# Patient Record
Sex: MALE | Race: WHITE | NOT HISPANIC OR LATINO | Employment: OTHER | URBAN - METROPOLITAN AREA
[De-identification: names, ages, dates, MRNs, and addresses within clinical notes are randomized per-mention and may not be internally consistent; named-entity substitution may affect disease eponyms.]

---

## 2023-09-02 ENCOUNTER — HOSPITAL ENCOUNTER (OUTPATIENT)
Dept: DATA CONVERSION | Facility: HOSPITAL | Age: 63
Discharge: HOME | End: 2023-09-03
Payer: COMMERCIAL

## 2023-09-02 DIAGNOSIS — S09.90XA UNSPECIFIED INJURY OF HEAD, INITIAL ENCOUNTER: ICD-10-CM

## 2023-09-02 DIAGNOSIS — S01.81XA LACERATION WITHOUT FOREIGN BODY OF OTHER PART OF HEAD, INITIAL ENCOUNTER: ICD-10-CM
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
## 2023-09-02 DIAGNOSIS — Z23 ENCOUNTER FOR IMMUNIZATION: ICD-10-CM

## 2023-09-02 DIAGNOSIS — F10.129 ALCOHOL ABUSE WITH INTOXICATION, UNSPECIFIED (CMS-HCC): ICD-10-CM

## 2023-09-02 DIAGNOSIS — R11.0 NAUSEA: ICD-10-CM

## 2023-09-02 DIAGNOSIS — F17.210 NICOTINE DEPENDENCE, CIGARETTES, UNCOMPLICATED: ICD-10-CM

## 2023-09-02 LAB
ALBUMIN SERPL-MCNC: 4.2 GM/DL (ref 3.5–5)
ALBUMIN/GLOB SERPL: 1.7 RATIO (ref 1.5–3)
ALP BLD-CCNC: 98 U/L (ref 35–125)
ALT SERPL-CCNC: 20 U/L (ref 5–40)
ANION GAP SERPL CALCULATED.3IONS-SCNC: 18 MMOL/L (ref 0–19)
AST SERPL-CCNC: 32 U/L (ref 5–40)
BASOPHILS # BLD AUTO: 0.05 K/UL (ref 0–0.22)
BASOPHILS NFR BLD AUTO: 0.4 % (ref 0–1)
BILIRUB SERPL-MCNC: 0.3 MG/DL (ref 0.1–1.2)
BUN SERPL-MCNC: 14 MG/DL (ref 8–25)
BUN/CREAT SERPL: 14 RATIO (ref 8–21)
CALCIUM SERPL-MCNC: 8.7 MG/DL (ref 8.5–10.4)
CHLORIDE SERPL-SCNC: 103 MMOL/L (ref 97–107)
CO2 SERPL-SCNC: 23 MMOL/L (ref 24–31)
CREAT SERPL-MCNC: 1 MG/DL (ref 0.4–1.6)
DEPRECATED RDW RBC AUTO: 41.1 FL (ref 37–54)
DIFFERENTIAL METHOD BLD: ABNORMAL
EOSINOPHIL # BLD AUTO: 0.1 K/UL (ref 0–0.45)
EOSINOPHIL NFR BLD: 0.9 % (ref 0–3)
ERYTHROCYTE [DISTWIDTH] IN BLOOD BY AUTOMATED COUNT: 12.1 % (ref 11.7–15)
ETHANOL SERPL-MCNC: 0.3 GM/DL (ref 0–0.01)
GFR SERPL CREATININE-BSD FRML MDRD: 86 ML/MIN/1.73 M2
GLOBULIN SER-MCNC: 2.5 G/DL (ref 1.9–3.7)
GLUCOSE SERPL-MCNC: 143 MG/DL (ref 65–99)
HCT VFR BLD AUTO: 39.6 % (ref 41–50)
HGB BLD-MCNC: 14.3 GM/DL (ref 13.5–16.5)
IMM GRANULOCYTES # BLD AUTO: 0.03 K/UL (ref 0–0.1)
LYMPHOCYTES # BLD AUTO: 3.33 K/UL (ref 1.2–3.2)
LYMPHOCYTES NFR BLD MANUAL: 28.6 % (ref 20–40)
MCH RBC QN AUTO: 33.3 PG (ref 26–34)
MCHC RBC AUTO-ENTMCNC: 36.1 % (ref 31–37)
MCV RBC AUTO: 92.3 FL (ref 80–100)
MONOCYTES # BLD AUTO: 0.75 K/UL (ref 0–0.8)
MONOCYTES NFR BLD MANUAL: 6.4 % (ref 0–8)
NEUTROPHILS # BLD AUTO: 7.4 K/UL
NEUTROPHILS # BLD AUTO: 7.4 K/UL (ref 1.8–7.7)
NEUTROPHILS.IMMATURE NFR BLD: 0.3 % (ref 0–1)
NEUTS SEG NFR BLD: 63.4 % (ref 50–70)
NRBC BLD-RTO: 0 /100 WBC
PLATELET # BLD AUTO: 259 K/UL (ref 150–450)
PMV BLD AUTO: 8.5 CU (ref 7–12.6)
POTASSIUM SERPL-SCNC: 3.6 MMOL/L (ref 3.4–5.1)
PROT SERPL-MCNC: 6.7 G/DL (ref 5.9–7.9)
RBC # BLD AUTO: 4.29 M/UL (ref 4.5–5.5)
SODIUM SERPL-SCNC: 144 MMOL/L (ref 133–145)
WBC # BLD AUTO: 11.7 K/UL (ref 4.5–11)

## 2024-05-02 ENCOUNTER — APPOINTMENT (OUTPATIENT)
Dept: CARDIOLOGY | Facility: HOSPITAL | Age: 64
End: 2024-05-02
Payer: COMMERCIAL

## 2024-05-02 ENCOUNTER — HOSPITAL ENCOUNTER (EMERGENCY)
Facility: HOSPITAL | Age: 64
Discharge: HOME | End: 2024-05-02
Payer: COMMERCIAL

## 2024-05-02 VITALS
DIASTOLIC BLOOD PRESSURE: 125 MMHG | OXYGEN SATURATION: 99 % | WEIGHT: 180 LBS | RESPIRATION RATE: 15 BRPM | TEMPERATURE: 98.2 F | SYSTOLIC BLOOD PRESSURE: 206 MMHG | HEART RATE: 86 BPM | BODY MASS INDEX: 24.38 KG/M2 | HEIGHT: 72 IN

## 2024-05-02 DIAGNOSIS — R25.1 SHAKINESS: ICD-10-CM

## 2024-05-02 DIAGNOSIS — F41.9 ANXIETY: Primary | ICD-10-CM

## 2024-05-02 LAB
ALBUMIN SERPL-MCNC: 4.2 G/DL (ref 3.5–5)
ALP BLD-CCNC: 94 U/L (ref 35–125)
ALT SERPL-CCNC: 20 U/L (ref 5–40)
ANION GAP SERPL CALC-SCNC: >19 MMOL/L
AST SERPL-CCNC: 23 U/L (ref 5–40)
BASOPHILS # BLD AUTO: 0.05 X10*3/UL (ref 0–0.1)
BASOPHILS NFR BLD AUTO: 0.4 %
BILIRUB SERPL-MCNC: 0.8 MG/DL (ref 0.1–1.2)
BUN SERPL-MCNC: 13 MG/DL (ref 8–25)
CALCIUM SERPL-MCNC: 8.9 MG/DL (ref 8.5–10.4)
CHLORIDE SERPL-SCNC: 98 MMOL/L (ref 97–107)
CO2 SERPL-SCNC: 20 MMOL/L (ref 24–31)
CREAT SERPL-MCNC: 0.8 MG/DL (ref 0.4–1.6)
EGFRCR SERPLBLD CKD-EPI 2021: >90 ML/MIN/1.73M*2
EOSINOPHIL # BLD AUTO: 0.03 X10*3/UL (ref 0–0.7)
EOSINOPHIL NFR BLD AUTO: 0.3 %
ERYTHROCYTE [DISTWIDTH] IN BLOOD BY AUTOMATED COUNT: 12 % (ref 11.5–14.5)
ETHANOL SERPL-MCNC: <0.01 G/DL
GLUCOSE SERPL-MCNC: 121 MG/DL (ref 65–99)
HCT VFR BLD AUTO: 41.5 % (ref 41–52)
HGB BLD-MCNC: 14.7 G/DL (ref 13.5–17.5)
IMM GRANULOCYTES # BLD AUTO: 0.04 X10*3/UL (ref 0–0.7)
IMM GRANULOCYTES NFR BLD AUTO: 0.4 % (ref 0–0.9)
LYMPHOCYTES # BLD AUTO: 2.01 X10*3/UL (ref 1.2–4.8)
LYMPHOCYTES NFR BLD AUTO: 17.8 %
MCH RBC QN AUTO: 32.5 PG (ref 26–34)
MCHC RBC AUTO-ENTMCNC: 35.4 G/DL (ref 32–36)
MCV RBC AUTO: 92 FL (ref 80–100)
MONOCYTES # BLD AUTO: 0.67 X10*3/UL (ref 0.1–1)
MONOCYTES NFR BLD AUTO: 5.9 %
NEUTROPHILS # BLD AUTO: 8.5 X10*3/UL (ref 1.2–7.7)
NEUTROPHILS NFR BLD AUTO: 75.2 %
NRBC BLD-RTO: 0 /100 WBCS (ref 0–0)
PLATELET # BLD AUTO: 219 X10*3/UL (ref 150–450)
POTASSIUM SERPL-SCNC: 3.4 MMOL/L (ref 3.4–5.1)
PROT SERPL-MCNC: 7.5 G/DL (ref 5.9–7.9)
RBC # BLD AUTO: 4.53 X10*6/UL (ref 4.5–5.9)
SODIUM SERPL-SCNC: 138 MMOL/L (ref 133–145)
WBC # BLD AUTO: 11.3 X10*3/UL (ref 4.4–11.3)

## 2024-05-02 PROCEDURE — 85025 COMPLETE CBC W/AUTO DIFF WBC: CPT | Performed by: STUDENT IN AN ORGANIZED HEALTH CARE EDUCATION/TRAINING PROGRAM

## 2024-05-02 PROCEDURE — 99284 EMERGENCY DEPT VISIT MOD MDM: CPT | Mod: 25

## 2024-05-02 PROCEDURE — 80053 COMPREHEN METABOLIC PANEL: CPT | Performed by: STUDENT IN AN ORGANIZED HEALTH CARE EDUCATION/TRAINING PROGRAM

## 2024-05-02 PROCEDURE — 36415 COLL VENOUS BLD VENIPUNCTURE: CPT | Performed by: STUDENT IN AN ORGANIZED HEALTH CARE EDUCATION/TRAINING PROGRAM

## 2024-05-02 PROCEDURE — 82077 ASSAY SPEC XCP UR&BREATH IA: CPT | Performed by: STUDENT IN AN ORGANIZED HEALTH CARE EDUCATION/TRAINING PROGRAM

## 2024-05-02 PROCEDURE — 93005 ELECTROCARDIOGRAM TRACING: CPT

## 2024-05-02 ASSESSMENT — PAIN SCALES - GENERAL: PAINLEVEL_OUTOF10: 0 - NO PAIN

## 2024-05-02 ASSESSMENT — PAIN - FUNCTIONAL ASSESSMENT: PAIN_FUNCTIONAL_ASSESSMENT: 0-10
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
## 2024-05-02 NOTE — ED TRIAGE NOTES
Pt brought via ems w complaints of shakes and weakness and tingling in the fingers. Pt states that his symptoms started today.

## 2024-05-03 NOTE — DISCHARGE INSTRUCTIONS
Thank you for choosing [facility]  for your emergency care.    Please return to the Emergency Department immediately if new or worsening symptoms occur. Symptoms of that are most concerning include lightheadedness, dizziness, syncopal episodes, chest pain or shortness of breath.    It is important to remember that your care does not end here and you must continue to monitor your condition closely. Please return to the emergency department for any worsening or concerning signs or symptoms as directed by our conversations and the discharge instructions. Otherwise please follow up with your doctor in 2 days if no better or worse. If you do not have a doctor please contact the referral number on your discharge instructions. Please contact any physician specialists provided in your discharge notes as it is very important to follow up with them regarding your condition. If you are unable to reach the physicians provided, please come back to the Emergency Department at any time.      As always, please take medications as directed. If you have any questions at all regarding your medications, please contact the pharmacist, the emergency department, or your doctor. Before taking any medication prescribed in the Emergency Department, please review the medication side effects and drug interactions as they may interact with your home medications.     Education materials have been provided to you about your encounter today.  Please review the attachments at your earliest convenience.

## 2024-05-07 LAB
ATRIAL RATE: 87 BPM
P AXIS: 81 DEGREES
P OFFSET: 193 MS
P ONSET: 133 MS
PR INTERVAL: 166 MS
Q ONSET: 216 MS
QRS COUNT: 15 BEATS
QRS DURATION: 90 MS
QT INTERVAL: 370 MS
QTC CALCULATION(BAZETT): 445 MS
QTC FREDERICIA: 418 MS
R AXIS: 71 DEGREES
T AXIS: 75 DEGREES
T OFFSET: 401 MS
VENTRICULAR RATE: 87 BPM

## 2024-07-28 ENCOUNTER — APPOINTMENT (OUTPATIENT)
Dept: RADIOLOGY | Facility: HOSPITAL | Age: 64
End: 2024-07-28
Payer: COMMERCIAL

## 2024-07-28 ENCOUNTER — APPOINTMENT (OUTPATIENT)
Dept: CARDIOLOGY | Facility: HOSPITAL | Age: 64
End: 2024-07-28
Payer: COMMERCIAL

## 2024-07-28 ENCOUNTER — HOSPITAL ENCOUNTER (OUTPATIENT)
Facility: HOSPITAL | Age: 64
Setting detail: OBSERVATION
Discharge: HOME | End: 2024-07-30
Attending: EMERGENCY MEDICINE | Admitting: HOSPITALIST
Payer: COMMERCIAL

## 2024-07-28 DIAGNOSIS — N30.00 ACUTE CYSTITIS WITHOUT HEMATURIA: ICD-10-CM

## 2024-07-28 DIAGNOSIS — F10.29 ALCOHOL DEPENDENCE WITH UNSPECIFIED ALCOHOL-INDUCED DISORDER (MULTI): ICD-10-CM

## 2024-07-28 DIAGNOSIS — F19.10 SUBSTANCE ABUSE (MULTI): ICD-10-CM

## 2024-07-28 DIAGNOSIS — R06.02 SHORTNESS OF BREATH: Primary | ICD-10-CM

## 2024-07-28 DIAGNOSIS — R29.6 FREQUENT FALLS: ICD-10-CM

## 2024-07-28 DIAGNOSIS — I10 DIASTOLIC HYPERTENSION: ICD-10-CM

## 2024-07-28 DIAGNOSIS — E87.20 LACTIC ACIDOSIS: ICD-10-CM

## 2024-07-28 PROBLEM — N39.0 URINARY TRACT INFECTION: Status: ACTIVE | Noted: 2024-07-28

## 2024-07-28 LAB
ALBUMIN SERPL-MCNC: 4.1 G/DL (ref 3.5–5)
ALP BLD-CCNC: 99 U/L (ref 35–125)
ALT SERPL-CCNC: 40 U/L (ref 5–40)
AMPHETAMINES UR QL SCN>1000 NG/ML: NEGATIVE
ANION GAP SERPL CALC-SCNC: 15 MMOL/L
APAP SERPL-MCNC: <5 UG/ML
APPEARANCE UR: CLEAR
AST SERPL-CCNC: 61 U/L (ref 5–40)
BARBITURATES UR QL SCN>300 NG/ML: NEGATIVE
BASOPHILS # BLD AUTO: 0.03 X10*3/UL (ref 0–0.1)
BASOPHILS NFR BLD AUTO: 0.2 %
BENZODIAZ UR QL SCN>300 NG/ML: NEGATIVE
BILIRUB SERPL-MCNC: 0.5 MG/DL (ref 0.1–1.2)
BILIRUB UR STRIP.AUTO-MCNC: NEGATIVE MG/DL
BUN SERPL-MCNC: 11 MG/DL (ref 8–25)
BZE UR QL SCN>300 NG/ML: NEGATIVE
CALCIUM SERPL-MCNC: 8.5 MG/DL (ref 8.5–10.4)
CANNABINOIDS UR QL SCN>50 NG/ML: POSITIVE
CHLORIDE SERPL-SCNC: 99 MMOL/L (ref 97–107)
CO2 SERPL-SCNC: 24 MMOL/L (ref 24–31)
COLOR UR: ABNORMAL
CREAT SERPL-MCNC: 0.8 MG/DL (ref 0.4–1.6)
EGFRCR SERPLBLD CKD-EPI 2021: >90 ML/MIN/1.73M*2
EOSINOPHIL # BLD AUTO: 0.44 X10*3/UL (ref 0–0.7)
EOSINOPHIL NFR BLD AUTO: 2.7 %
ERYTHROCYTE [DISTWIDTH] IN BLOOD BY AUTOMATED COUNT: 11.7 % (ref 11.5–14.5)
ETHANOL SERPL-MCNC: 0.09 G/DL
ETHANOL SERPL-MCNC: 0.29 G/DL
FENTANYL+NORFENTANYL UR QL SCN: NEGATIVE
GLUCOSE SERPL-MCNC: 97 MG/DL (ref 65–99)
GLUCOSE UR STRIP.AUTO-MCNC: NORMAL MG/DL
HCT VFR BLD AUTO: 42.1 % (ref 41–52)
HGB BLD-MCNC: 15.1 G/DL (ref 13.5–17.5)
HYALINE CASTS #/AREA URNS AUTO: ABNORMAL /LPF
IMM GRANULOCYTES # BLD AUTO: 0.06 X10*3/UL (ref 0–0.7)
IMM GRANULOCYTES NFR BLD AUTO: 0.4 % (ref 0–0.9)
INR PPP: 1 (ref 0.9–1.2)
KETONES UR STRIP.AUTO-MCNC: NEGATIVE MG/DL
LACTATE BLDV-SCNC: 2.6 MMOL/L (ref 0.4–2)
LACTATE BLDV-SCNC: 2.8 MMOL/L (ref 0.4–2)
LEUKOCYTE ESTERASE UR QL STRIP.AUTO: ABNORMAL
LYMPHOCYTES # BLD AUTO: 2.82 X10*3/UL (ref 1.2–4.8)
LYMPHOCYTES NFR BLD AUTO: 17.3 %
MAGNESIUM SERPL-MCNC: 1.9 MG/DL (ref 1.6–3.1)
MCH RBC QN AUTO: 32.3 PG (ref 26–34)
MCHC RBC AUTO-ENTMCNC: 35.9 G/DL (ref 32–36)
MCV RBC AUTO: 90 FL (ref 80–100)
METHADONE UR QL SCN>300 NG/ML: NEGATIVE
MONOCYTES # BLD AUTO: 0.91 X10*3/UL (ref 0.1–1)
MONOCYTES NFR BLD AUTO: 5.6 %
MUCOUS THREADS #/AREA URNS AUTO: ABNORMAL /LPF
NEUTROPHILS # BLD AUTO: 12.02 X10*3/UL (ref 1.2–7.7)
NEUTROPHILS NFR BLD AUTO: 73.8 %
NITRITE UR QL STRIP.AUTO: NEGATIVE
NRBC BLD-RTO: 0 /100 WBCS (ref 0–0)
OPIATES UR QL SCN>300 NG/ML: NEGATIVE
OXYCODONE UR QL: NEGATIVE
PCP UR QL SCN>25 NG/ML: NEGATIVE
PH UR STRIP.AUTO: 6 [PH]
PLATELET # BLD AUTO: 265 X10*3/UL (ref 150–450)
POTASSIUM SERPL-SCNC: 3.7 MMOL/L (ref 3.4–5.1)
PROT SERPL-MCNC: 6.8 G/DL (ref 5.9–7.9)
PROT UR STRIP.AUTO-MCNC: NEGATIVE MG/DL
PROTHROMBIN TIME: 10.3 SECONDS (ref 9.3–12.7)
RBC # BLD AUTO: 4.67 X10*6/UL (ref 4.5–5.9)
RBC # UR STRIP.AUTO: ABNORMAL /UL
RBC #/AREA URNS AUTO: ABNORMAL /HPF
SALICYLATES SERPL-MCNC: <0 MG/DL
SODIUM SERPL-SCNC: 138 MMOL/L (ref 133–145)
SP GR UR STRIP.AUTO: 1.01
SQUAMOUS #/AREA URNS AUTO: ABNORMAL /HPF
TROPONIN T SERPL-MCNC: 8 NG/L
TROPONIN T SERPL-MCNC: 8 NG/L
UROBILINOGEN UR STRIP.AUTO-MCNC: NORMAL MG/DL
WBC # BLD AUTO: 16.3 X10*3/UL (ref 4.4–11.3)
WBC #/AREA URNS AUTO: ABNORMAL /HPF

## 2024-07-28 PROCEDURE — 2500000004 HC RX 250 GENERAL PHARMACY W/ HCPCS (ALT 636 FOR OP/ED): Performed by: NURSE PRACTITIONER

## 2024-07-28 PROCEDURE — 70450 CT HEAD/BRAIN W/O DYE: CPT | Performed by: RADIOLOGY

## 2024-07-28 PROCEDURE — 36415 COLL VENOUS BLD VENIPUNCTURE: CPT

## 2024-07-28 PROCEDURE — 82077 ASSAY SPEC XCP UR&BREATH IA: CPT | Performed by: NURSE PRACTITIONER

## 2024-07-28 PROCEDURE — 96361 HYDRATE IV INFUSION ADD-ON: CPT

## 2024-07-28 PROCEDURE — 2500000001 HC RX 250 WO HCPCS SELF ADMINISTERED DRUGS (ALT 637 FOR MEDICARE OP): Performed by: NURSE PRACTITIONER

## 2024-07-28 PROCEDURE — 2500000001 HC RX 250 WO HCPCS SELF ADMINISTERED DRUGS (ALT 637 FOR MEDICARE OP): Performed by: EMERGENCY MEDICINE

## 2024-07-28 PROCEDURE — 83735 ASSAY OF MAGNESIUM: CPT | Performed by: NURSE PRACTITIONER

## 2024-07-28 PROCEDURE — 87086 URINE CULTURE/COLONY COUNT: CPT | Mod: WESLAB | Performed by: EMERGENCY MEDICINE

## 2024-07-28 PROCEDURE — 70486 CT MAXILLOFACIAL W/O DYE: CPT

## 2024-07-28 PROCEDURE — 2500000004 HC RX 250 GENERAL PHARMACY W/ HCPCS (ALT 636 FOR OP/ED): Performed by: EMERGENCY MEDICINE

## 2024-07-28 PROCEDURE — 2500000004 HC RX 250 GENERAL PHARMACY W/ HCPCS (ALT 636 FOR OP/ED)

## 2024-07-28 PROCEDURE — 96375 TX/PRO/DX INJ NEW DRUG ADDON: CPT | Mod: 59

## 2024-07-28 PROCEDURE — 93010 ELECTROCARDIOGRAM REPORT: CPT | Performed by: INTERNAL MEDICINE

## 2024-07-28 PROCEDURE — 80143 DRUG ASSAY ACETAMINOPHEN: CPT

## 2024-07-28 PROCEDURE — 70450 CT HEAD/BRAIN W/O DYE: CPT

## 2024-07-28 PROCEDURE — 99291 CRITICAL CARE FIRST HOUR: CPT | Mod: 25

## 2024-07-28 PROCEDURE — 71260 CT THORAX DX C+: CPT | Performed by: RADIOLOGY

## 2024-07-28 PROCEDURE — 80307 DRUG TEST PRSMV CHEM ANLYZR: CPT | Performed by: EMERGENCY MEDICINE

## 2024-07-28 PROCEDURE — 96365 THER/PROPH/DIAG IV INF INIT: CPT | Mod: 59

## 2024-07-28 PROCEDURE — 2550000001 HC RX 255 CONTRASTS: Performed by: EMERGENCY MEDICINE

## 2024-07-28 PROCEDURE — 93005 ELECTROCARDIOGRAM TRACING: CPT

## 2024-07-28 PROCEDURE — 72125 CT NECK SPINE W/O DYE: CPT

## 2024-07-28 PROCEDURE — 87040 BLOOD CULTURE FOR BACTERIA: CPT | Mod: WESLAB | Performed by: EMERGENCY MEDICINE

## 2024-07-28 PROCEDURE — 85025 COMPLETE CBC W/AUTO DIFF WBC: CPT

## 2024-07-28 PROCEDURE — 83605 ASSAY OF LACTIC ACID: CPT | Performed by: EMERGENCY MEDICINE

## 2024-07-28 PROCEDURE — G0378 HOSPITAL OBSERVATION PER HR: HCPCS

## 2024-07-28 PROCEDURE — 84484 ASSAY OF TROPONIN QUANT: CPT | Performed by: EMERGENCY MEDICINE

## 2024-07-28 PROCEDURE — 72125 CT NECK SPINE W/O DYE: CPT | Performed by: RADIOLOGY

## 2024-07-28 PROCEDURE — 84075 ASSAY ALKALINE PHOSPHATASE: CPT

## 2024-07-28 PROCEDURE — 74177 CT ABD & PELVIS W/CONTRAST: CPT

## 2024-07-28 PROCEDURE — 85610 PROTHROMBIN TIME: CPT | Performed by: EMERGENCY MEDICINE

## 2024-07-28 PROCEDURE — 74177 CT ABD & PELVIS W/CONTRAST: CPT | Performed by: RADIOLOGY

## 2024-07-28 PROCEDURE — 36415 COLL VENOUS BLD VENIPUNCTURE: CPT | Performed by: EMERGENCY MEDICINE

## 2024-07-28 PROCEDURE — 81001 URINALYSIS AUTO W/SCOPE: CPT | Performed by: EMERGENCY MEDICINE

## 2024-07-28 RX ORDER — FAMOTIDINE 10 MG/ML
20 INJECTION INTRAVENOUS ONCE
Status: COMPLETED | OUTPATIENT
Start: 2024-07-28 | End: 2024-07-28

## 2024-07-28 RX ORDER — ACETAMINOPHEN 650 MG/1
650 SUPPOSITORY RECTAL EVERY 4 HOURS PRN
Status: DISCONTINUED | OUTPATIENT
Start: 2024-07-28 | End: 2024-07-30 | Stop reason: HOSPADM

## 2024-07-28 RX ORDER — ACETAMINOPHEN 325 MG/1
650 TABLET ORAL EVERY 4 HOURS PRN
Status: DISCONTINUED | OUTPATIENT
Start: 2024-07-28 | End: 2024-07-30 | Stop reason: HOSPADM

## 2024-07-28 RX ORDER — MULTIVIT-MIN/IRON FUM/FOLIC AC 7.5 MG-4
1 TABLET ORAL ONCE
Status: COMPLETED | OUTPATIENT
Start: 2024-07-28 | End: 2024-07-28

## 2024-07-28 RX ORDER — DIPHENHYDRAMINE HYDROCHLORIDE 50 MG/ML
25 INJECTION INTRAMUSCULAR; INTRAVENOUS ONCE
Status: COMPLETED | OUTPATIENT
Start: 2024-07-28 | End: 2024-07-28

## 2024-07-28 RX ORDER — ACETAMINOPHEN 160 MG/5ML
650 SOLUTION ORAL EVERY 4 HOURS PRN
Status: DISCONTINUED | OUTPATIENT
Start: 2024-07-28 | End: 2024-07-30 | Stop reason: HOSPADM

## 2024-07-28 RX ORDER — FOLIC ACID 1 MG/1
1 TABLET ORAL DAILY
Status: DISCONTINUED | OUTPATIENT
Start: 2024-07-28 | End: 2024-07-30 | Stop reason: HOSPADM

## 2024-07-28 RX ORDER — ENOXAPARIN SODIUM 100 MG/ML
40 INJECTION SUBCUTANEOUS DAILY
Status: DISCONTINUED | OUTPATIENT
Start: 2024-07-29 | End: 2024-07-30 | Stop reason: HOSPADM

## 2024-07-28 RX ORDER — DIPHENHYDRAMINE HCL 25 MG
25 TABLET ORAL EVERY 6 HOURS PRN
Status: DISCONTINUED | OUTPATIENT
Start: 2024-07-28 | End: 2024-07-30 | Stop reason: HOSPADM

## 2024-07-28 RX ORDER — LORAZEPAM 1 MG/1
1 TABLET ORAL EVERY 2 HOUR PRN
Status: DISCONTINUED | OUTPATIENT
Start: 2024-07-28 | End: 2024-07-30 | Stop reason: HOSPADM

## 2024-07-28 RX ORDER — DIAZEPAM 5 MG/1
10 TABLET ORAL EVERY 2 HOUR PRN
Status: DISCONTINUED | OUTPATIENT
Start: 2024-07-28 | End: 2024-07-28

## 2024-07-28 RX ORDER — ONDANSETRON 4 MG/1
4 TABLET, FILM COATED ORAL EVERY 8 HOURS PRN
Status: DISCONTINUED | OUTPATIENT
Start: 2024-07-28 | End: 2024-07-30 | Stop reason: HOSPADM

## 2024-07-28 RX ORDER — ONDANSETRON HYDROCHLORIDE 2 MG/ML
4 INJECTION, SOLUTION INTRAVENOUS EVERY 8 HOURS PRN
Status: DISCONTINUED | OUTPATIENT
Start: 2024-07-28 | End: 2024-07-30 | Stop reason: HOSPADM

## 2024-07-28 RX ORDER — LANOLIN ALCOHOL/MO/W.PET/CERES
100 CREAM (GRAM) TOPICAL DAILY
Status: DISCONTINUED | OUTPATIENT
Start: 2024-07-31 | End: 2024-07-30 | Stop reason: HOSPADM

## 2024-07-28 RX ORDER — TALC
3 POWDER (GRAM) TOPICAL NIGHTLY PRN
Status: DISCONTINUED | OUTPATIENT
Start: 2024-07-28 | End: 2024-07-30 | Stop reason: HOSPADM

## 2024-07-28 RX ORDER — MULTIVIT-MIN/IRON FUM/FOLIC AC 7.5 MG-4
1 TABLET ORAL DAILY
Status: DISCONTINUED | OUTPATIENT
Start: 2024-07-28 | End: 2024-07-30 | Stop reason: HOSPADM

## 2024-07-28 RX ORDER — FOLIC ACID 1 MG/1
1 TABLET ORAL ONCE
Status: COMPLETED | OUTPATIENT
Start: 2024-07-28 | End: 2024-07-28

## 2024-07-28 RX ORDER — HYDROCORTISONE 1 %
CREAM (GRAM) TOPICAL 2 TIMES DAILY
Status: DISCONTINUED | OUTPATIENT
Start: 2024-07-28 | End: 2024-07-30 | Stop reason: HOSPADM

## 2024-07-28 RX ORDER — THIAMINE HYDROCHLORIDE 100 MG/ML
100 INJECTION, SOLUTION INTRAMUSCULAR; INTRAVENOUS DAILY
Status: COMPLETED | OUTPATIENT
Start: 2024-07-28 | End: 2024-07-30

## 2024-07-28 RX ORDER — LORAZEPAM 1 MG/1
2 TABLET ORAL EVERY 2 HOUR PRN
Status: DISCONTINUED | OUTPATIENT
Start: 2024-07-28 | End: 2024-07-30 | Stop reason: HOSPADM

## 2024-07-28 RX ORDER — MAGNESIUM HYDROXIDE 2400 MG/10ML
10 SUSPENSION ORAL DAILY PRN
Status: DISCONTINUED | OUTPATIENT
Start: 2024-07-28 | End: 2024-07-30 | Stop reason: HOSPADM

## 2024-07-28 RX ORDER — LORAZEPAM 0.5 MG/1
0.5 TABLET ORAL EVERY 2 HOUR PRN
Status: DISCONTINUED | OUTPATIENT
Start: 2024-07-28 | End: 2024-07-30 | Stop reason: HOSPADM

## 2024-07-28 SDOH — SOCIAL STABILITY: SOCIAL INSECURITY: ABUSE: ADULT

## 2024-07-28 SDOH — SOCIAL STABILITY: SOCIAL INSECURITY: WERE YOU ABLE TO COMPLETE ALL THE BEHAVIORAL HEALTH SCREENINGS?: YES

## 2024-07-28 SDOH — SOCIAL STABILITY: SOCIAL INSECURITY: ARE YOU OR HAVE YOU BEEN THREATENED OR ABUSED PHYSICALLY, EMOTIONALLY, OR SEXUALLY BY ANYONE?: YES

## 2024-07-28 SDOH — SOCIAL STABILITY: SOCIAL INSECURITY: ARE THERE ANY APPARENT SIGNS OF INJURIES/BEHAVIORS THAT COULD BE RELATED TO ABUSE/NEGLECT?: NO

## 2024-07-28 SDOH — SOCIAL STABILITY: SOCIAL INSECURITY: DO YOU FEEL ANYONE HAS EXPLOITED OR TAKEN ADVANTAGE OF YOU FINANCIALLY OR OF YOUR PERSONAL PROPERTY?: NO

## 2024-07-28 SDOH — SOCIAL STABILITY: SOCIAL INSECURITY: HAVE YOU HAD THOUGHTS OF HARMING ANYONE ELSE?: NO

## 2024-07-28 SDOH — SOCIAL STABILITY: SOCIAL INSECURITY: DO YOU FEEL UNSAFE GOING BACK TO THE PLACE WHERE YOU ARE LIVING?: NO

## 2024-07-28 SDOH — SOCIAL STABILITY: SOCIAL INSECURITY: HAVE YOU HAD ANY THOUGHTS OF HARMING ANYONE ELSE?: NO

## 2024-07-28 SDOH — SOCIAL STABILITY: SOCIAL INSECURITY: DOES ANYONE TRY TO KEEP YOU FROM HAVING/CONTACTING OTHER FRIENDS OR DOING THINGS OUTSIDE YOUR HOME?: NO

## 2024-07-28 SDOH — SOCIAL STABILITY: SOCIAL INSECURITY: HAS ANYONE EVER THREATENED TO HURT YOUR FAMILY OR YOUR PETS?: NO

## 2024-07-28 ASSESSMENT — PATIENT HEALTH QUESTIONNAIRE - PHQ9
1. LITTLE INTEREST OR PLEASURE IN DOING THINGS: SEVERAL DAYS
SUM OF ALL RESPONSES TO PHQ9 QUESTIONS 1 & 2: 2
2. FEELING DOWN, DEPRESSED OR HOPELESS: SEVERAL DAYS

## 2024-07-28 ASSESSMENT — LIFESTYLE VARIABLES
HAVE PEOPLE ANNOYED YOU BY CRITICIZING YOUR DRINKING: YES
HAS A RELATIVE, FRIEND, DOCTOR, OR ANOTHER HEALTH PROFESSIONAL EXPRESSED CONCERN ABOUT YOUR DRINKING OR SUGGESTED YOU CUT DOWN: YES, DURING THE LAST YEAR
VISUAL DISTURBANCES: NOT PRESENT
HAVE YOU EVER FELT YOU SHOULD CUT DOWN ON YOUR DRINKING: YES
HOW MANY STANDARD DRINKS CONTAINING ALCOHOL DO YOU HAVE ON A TYPICAL DAY: 10 OR MORE
TREMOR: 3
AUDIT-C TOTAL SCORE: 11
HEADACHE, FULLNESS IN HEAD: NOT PRESENT
SKIP TO QUESTIONS 9-10: 0
PAROXYSMAL SWEATS: NO SWEAT VISIBLE
TOTAL SCORE: 0
HOW OFTEN DURING THE LAST YEAR HAVE YOU FAILED TO DO WHAT WAS NORMALLY EXPECTED FROM YOU BECAUSE OF DRINKING: WEEKLY
AUDITORY DISTURBANCES: NOT PRESENT
PAROXYSMAL SWEATS: NO SWEAT VISIBLE
HAVE YOU OR SOMEONE ELSE BEEN INJURED AS A RESULT OF YOUR DRINKING: YES, DURING THE LAST YEAR
VISUAL DISTURBANCES: NOT PRESENT
SUBSTANCE_ABUSE_PAST_12_MONTHS: NO
PRESCIPTION_ABUSE_PAST_12_MONTHS: NO
NAUSEA AND VOMITING: NO NAUSEA AND NO VOMITING
NAUSEA AND VOMITING: NO NAUSEA AND NO VOMITING
ANXIETY: MODERATELY ANXIOUS, OR GUARDED, SO ANXIETY IS INFERRED
AGITATION: NORMAL ACTIVITY
PAROXYSMAL SWEATS: NO SWEAT VISIBLE
VISUAL DISTURBANCES: NOT PRESENT
ORIENTATION AND CLOUDING OF SENSORIUM: ORIENTED AND CAN DO SERIAL ADDITIONS
AUDITORY DISTURBANCES: NOT PRESENT
HEADACHE, FULLNESS IN HEAD: NOT PRESENT
EVER HAD A DRINK FIRST THING IN THE MORNING TO STEADY YOUR NERVES TO GET RID OF A HANGOVER: YES
AUDIT TOTAL SCORE: 34
TOTAL SCORE: 4
AGITATION: NORMAL ACTIVITY
HOW OFTEN DURING THE LAST YEAR HAVE YOU FOUND THAT YOU WERE NOT ABLE TO STOP DRINKING ONCE YOU HAD STARTED: WEEKLY
AGITATION: NORMAL ACTIVITY
HOW OFTEN DURING THE LAST YEAR HAVE YOU NEEDED AN ALCOHOLIC DRINK FIRST THING IN THE MORNING TO GET YOURSELF GOING AFTER A NIGHT OF HEAVY DRINKING: WEEKLY
AUDIT-C TOTAL SCORE: 11
ORIENTATION AND CLOUDING OF SENSORIUM: ORIENTED AND CAN DO SERIAL ADDITIONS
TREMOR: NO TREMOR
HOW OFTEN DO YOU HAVE 6 OR MORE DRINKS ON ONE OCCASION: WEEKLY
HOW OFTEN DURING THE LAST YEAR HAVE YOU HAD A FEELING OF GUILT OR REMORSE AFTER DRINKING: WEEKLY
TOTAL SCORE: 7
ANXIETY: MODERATELY ANXIOUS, OR GUARDED, SO ANXIETY IS INFERRED
NAUSEA AND VOMITING: NO NAUSEA AND NO VOMITING
TOTAL SCORE: 7
AUDIT TOTAL SCORE: 23
TREMOR: 3
HOW OFTEN DURING THE LAST YEAR HAVE YOU BEEN UNABLE TO REMEMBER WHAT HAPPENED THE NIGHT BEFORE BECAUSE YOU HAD BEEN DRINKING: WEEKLY
EVER FELT BAD OR GUILTY ABOUT YOUR DRINKING: YES
AUDITORY DISTURBANCES: NOT PRESENT
ANXIETY: NO ANXIETY, AT EASE
ORIENTATION AND CLOUDING OF SENSORIUM: ORIENTED AND CAN DO SERIAL ADDITIONS
HOW OFTEN DO YOU HAVE A DRINK CONTAINING ALCOHOL: 4 OR MORE TIMES A WEEK
HEADACHE, FULLNESS IN HEAD: NOT PRESENT

## 2024-07-28 ASSESSMENT — ACTIVITIES OF DAILY LIVING (ADL)
FEEDING YOURSELF: INDEPENDENT
BATHING: INDEPENDENT
LACK_OF_TRANSPORTATION: YES
DRESSING YOURSELF: INDEPENDENT
ADEQUATE_TO_COMPLETE_ADL: YES
GROOMING: INDEPENDENT
TOILETING: INDEPENDENT
JUDGMENT_ADEQUATE_SAFELY_COMPLETE_DAILY_ACTIVITIES: YES
PATIENT'S MEMORY ADEQUATE TO SAFELY COMPLETE DAILY ACTIVITIES?: YES
WALKS IN HOME: INDEPENDENT
HEARING - RIGHT EAR: FUNCTIONAL
HEARING - LEFT EAR: FUNCTIONAL

## 2024-07-28 ASSESSMENT — PAIN - FUNCTIONAL ASSESSMENT: PAIN_FUNCTIONAL_ASSESSMENT: 0-10

## 2024-07-28 ASSESSMENT — COLUMBIA-SUICIDE SEVERITY RATING SCALE - C-SSRS
1. IN THE PAST MONTH, HAVE YOU WISHED YOU WERE DEAD OR WISHED YOU COULD GO TO SLEEP AND NOT WAKE UP?: NO
6. HAVE YOU EVER DONE ANYTHING, STARTED TO DO ANYTHING, OR PREPARED TO DO ANYTHING TO END YOUR LIFE?: NO
2. HAVE YOU ACTUALLY HAD ANY THOUGHTS OF KILLING YOURSELF?: NO

## 2024-07-28 ASSESSMENT — COGNITIVE AND FUNCTIONAL STATUS - GENERAL
DAILY ACTIVITIY SCORE: 24
MOBILITY SCORE: 24
PATIENT BASELINE BEDBOUND: NO

## 2024-07-28 ASSESSMENT — ENCOUNTER SYMPTOMS
APPETITE CHANGE: 1
RHINORRHEA: 0
FATIGUE: 1
NUMBNESS: 0
DYSURIA: 0
VOMITING: 0
ABDOMINAL DISTENTION: 0
PALPITATIONS: 0
CONSTIPATION: 1
DIZZINESS: 0
SHORTNESS OF BREATH: 0
CHEST TIGHTNESS: 0
NAUSEA: 0
FEVER: 0
DIARRHEA: 0
COUGH: 0
ABDOMINAL PAIN: 0
LIGHT-HEADEDNESS: 0

## 2024-07-28 ASSESSMENT — PAIN SCALES - GENERAL
PAINLEVEL_OUTOF10: 0 - NO PAIN
PAINLEVEL_OUTOF10: 0 - NO PAIN

## 2024-07-28 NOTE — ED PROVIDER NOTES
HPI   Chief Complaint   Patient presents with    Alcohol Intoxication    Facial Swelling    Shortness of Breath       HPI  Patient is a 64-year-old male presenting to the ER for evaluation of facial swelling with generalized malaise.  Patient states that he was recently released from [facility] last Tuesday.  He states that he does have an alcohol problem and drinks more than 3 pints of vodka each day.  He states that he does have frequent blackouts and believes that he has fallen but does not know exactly when that was.  He is unsure if he hit his head or lost consciousness.  He states that he does have chronic dyspnea and does not note any new changes in the symptoms.  He denies any headache or neck or back pain.  He states that he woke up this morning and felt that his eyes were puffy.  Denies any vision changes.  Denies eye pain.  Denies difficulty swallowing or speaking.  He has no other acute complaints.      Patient History   No past medical history on file.  No past surgical history on file.  No family history on file.  Social History     Tobacco Use    Smoking status: Not on file    Smokeless tobacco: Not on file   Substance Use Topics    Alcohol use: Not on file    Drug use: Not on file       Physical Exam   ED Triage Vitals [07/28/24 1314]   Temperature Heart Rate Respirations BP   36.5 °C (97.7 °F) (!) 112 16 (!) 149/116      Pulse Ox Temp Source Heart Rate Source Patient Position   97 % Temporal Monitor Sitting      BP Location FiO2 (%)     Left arm --       Physical Exam  Vitals and nursing note reviewed.   Constitutional:       General: He is not in acute distress.     Appearance: He is well-developed.      Comments: Clinically intoxicated but in no acute distress   HENT:      Head: Normocephalic and atraumatic.      Mouth/Throat:      Comments: Poor dentition.  No evidence of angioedema, tolerating secretions well, no fluctuant masses identified, no peritonsillar abscess, uvula midline  Eyes:       Conjunctiva/sclera: Conjunctivae normal.   Neck:      Comments: No midline tenderness to palpation  Cardiovascular:      Rate and Rhythm: Normal rate and regular rhythm.      Heart sounds: No murmur heard.  Pulmonary:      Effort: Pulmonary effort is normal. No respiratory distress.      Breath sounds: Normal breath sounds.   Abdominal:      Palpations: Abdomen is soft.      Tenderness: There is no abdominal tenderness.   Musculoskeletal:         General: No swelling.      Cervical back: Normal range of motion and neck supple.   Skin:     General: Skin is warm and dry.      Capillary Refill: Capillary refill takes less than 2 seconds.   Neurological:      Mental Status: He is alert.   Psychiatric:         Mood and Affect: Mood normal.           ED Course & MDM                        Divine Coma Scale Score: 15                        Medical Decision Making  Parts of this chart have been completed using voice recognition software. Please excuse any errors of transcription.  My thought process and reason for plan has been formulated from the time that I saw the patient until the time of disposition and is not specific to one specific moment during their visit and furthermore my MDM encompasses this entire chart and not only this text box.      HPI: Detailed above.    Exam: A medically appropriate exam performed, outlined above, given the known history and presentation.    History obtained from: Patient    Social Determinants of Health considered during this visit: Lives independently    Medications given during visit:  Medications   thiamine (Vitamin B1) injection 100 mg (100 mg intravenous Given 7/28/24 1520)   thiamine (Vitamin B-1) tablet 100 mg (has no administration in time range)   diazePAM (Valium) tablet 10 mg (has no administration in time range)   sodium chloride 0.9 % bolus 2,382 mL (has no administration in time range)   diphenhydrAMINE (BENADryl) injection 25 mg (25 mg intravenous Given 7/28/24  1356)   methylPREDNISolone sod succinate (SOLU-Medrol) injection 125 mg (125 mg intravenous Given 7/28/24 1356)   famotidine PF (Pepcid) injection 20 mg (20 mg intravenous Given 7/28/24 1356)   folic acid (Folvite) tablet 1 mg (1 mg oral Given 7/28/24 1428)   multivitamin with minerals 1 tablet (1 tablet oral Given 7/28/24 1427)   sodium chloride 0.9 % bolus 1,000 mL (1,000 mL intravenous New Bag 7/28/24 1425)   cefepime (Maxipime) in dextrose 5% 50 mL IV 1 g (1 g intravenous Given 7/28/24 1520)   iohexol (OMNIPaque) 350 mg iodine/mL solution 75 mL (75 mL intravenous Given 7/28/24 1452)        Diagnostic/tests  Labs Reviewed   CBC WITH AUTO DIFFERENTIAL - Abnormal       Result Value    WBC 16.3 (*)     nRBC 0.0      RBC 4.67      Hemoglobin 15.1      Hematocrit 42.1      MCV 90      MCH 32.3      MCHC 35.9      RDW 11.7      Platelets 265      Neutrophils % 73.8      Immature Granulocytes %, Automated 0.4      Lymphocytes % 17.3      Monocytes % 5.6      Eosinophils % 2.7      Basophils % 0.2      Neutrophils Absolute 12.02 (*)     Immature Granulocytes Absolute, Automated 0.06      Lymphocytes Absolute 2.82      Monocytes Absolute 0.91      Eosinophils Absolute 0.44      Basophils Absolute 0.03     COMPREHENSIVE METABOLIC PANEL - Abnormal    Glucose 97      Sodium 138      Potassium 3.7      Chloride 99      Bicarbonate 24      Urea Nitrogen 11      Creatinine 0.80      eGFR >90      Calcium 8.5      Albumin 4.1      Alkaline Phosphatase 99      Total Protein 6.8      AST 61 (*)     Bilirubin, Total 0.5      ALT 40      Anion Gap 15     ACUTE TOXICOLOGY PANEL, BLOOD - Abnormal    Acetaminophen <5.0      Salicylate  <0      Alcohol 0.288 (*)    DRUG SCREEN,URINE - Abnormal    Amphetamine Screen, Urine Negative      Barbiturate Screen, Urine Negative      Benzodiazepines Screen, Urine Negative      Cannabinoid Screen, Urine Positive (*)     Cocaine Metabolite Screen, Urine Negative      Fentanyl Screen, Urine  Negative      Methadone Screen, Urine Negative      Opiate Screen, Urine Negative      Oxycodone Screen, Urine Negative      PCP Screen, Urine Negative      Narrative:     These toxicological screening tests provide unconfirmed qualitative measurements to aid in treatment and diagnosis in cases of drug use or overdose. This test is used only for medical purposes. A positive result does not indicate or measure intoxication. For specific test performance or pathologist consultation, please contact the Laboratory.    The following threshold concentrations are used for these analyses.Values at or above the threshold concentration are reported as positive. Values below the threshold are reported as negative.    Drug /Screening Threshold                                                                                                 THC/CANNABINOIDS................50 ng/ml  METHADONE......................300 ng/ml  COCAINE METABOLITES............300 ng/ml  BENZODIAZEPINE.................300 ng/ml  PCP.............................25 ng/ml  OPIATE.........................300 ng/ml  AMPHETAMINE/ECSTASY...........1000 ng/ml  BARBITURATE....................200 ng/ml  OXYCODONE......................100 ng/ml  FENTANYL.........................5 ng/ml       BLOOD GAS LACTIC ACID, VENOUS - Abnormal    POCT Lactate, Venous 2.6 (*)    URINALYSIS WITH REFLEX CULTURE AND MICROSCOPIC - Abnormal    Color, Urine Light-Yellow      Appearance, Urine Clear      Specific Gravity, Urine 1.012      pH, Urine 6.0      Protein, Urine NEGATIVE      Glucose, Urine Normal      Blood, Urine 0.03 (TRACE) (*)     Ketones, Urine NEGATIVE      Bilirubin, Urine NEGATIVE      Urobilinogen, Urine Normal      Nitrite, Urine NEGATIVE      Leukocyte Esterase, Urine 25 Rhonda/µL (*)    MICROSCOPIC ONLY, URINE - Abnormal    WBC, Urine 1-5      RBC, Urine 1-2      Squamous Epithelial Cells, Urine 1-9 (SPARSE)      Mucus, Urine FEW      Hyaline Casts, Urine  OCCASIONAL (*)    PROTIME-INR - Normal    Protime 10.3      INR 1.0      Narrative:     INR Therapeutic Range: 2.0-3.5   TROPONIN T, HIGH SENSITIVITY - Normal    Troponin T, High Sensitivity 8     BLOOD CULTURE   BLOOD CULTURE   URINE CULTURE   URINALYSIS WITH REFLEX CULTURE AND MICROSCOPIC    Narrative:     The following orders were created for panel order Urinalysis with Reflex Culture and Microscopic.  Procedure                               Abnormality         Status                     ---------                               -----------         ------                     Urinalysis with Reflex C...[[id number]]  Abnormal            Final result               Extra Urine Gray Tube[[id number]]                            In process                   Please view results for these tests on the individual orders.   EXTRA URINE GRAY TUBE   BLOOD GAS LACTIC ACID, VENOUS      CT chest abdomen pelvis w IV contrast   Final Result   1. No acute abnormalities in the chest, abdomen, or pelvis   2. Severe atherosclerotic coronary artery calcifications   3. A few small renal cysts are suspected   4. Degenerative changes of the spine and grade 2 spondylolisthesis L4   on L5             MACRO:   None.        Signed by: Caridad Monroe 7/28/2024 3:54 PM   Dictation workstation:   [id number]      CT head wo IV contrast   Final Result   No acute intracranial abnormality.        MACRO:   None.        Signed by: Caridad Monroe 7/28/2024 2:48 PM   Dictation workstation:   [id number]      CT cervical spine wo IV contrast   Final Result        1. No acute bony abnormalities   2. Multilevel cervical spondylosis.        MACRO:   None        Signed by: Caridad Monroe 7/28/2024 2:56 PM   Dictation workstation:   [id number]      CT maxillofacial bones wo IV contrast   Final Result   No acute facial bone fracture visualized.        MACRO:   None        Signed by: Caridad Mornoe 7/28/2024 3:02 PM   Dictation workstation:   [id number]            Considerations/further MDM:  Patient is a 64-year-old male presenting for evaluation of facial swelling, alcohol intoxication    Patient awake and alert clinically intoxicated and tachycardic but in no acute distress upon arrival to the ER.  He does not have any step-offs or deformities or evidence of facial trauma on exam.  He has no midline spinal tenderness to palpation.  He has no focal neurologic deficits on exam.  He has no evidence of airway compromise or respiratory distress.    EKG performed upon arrival without acute ischemia    IV fluids, IV Pepcid, IV Benadryl, IV Solu-Medrol, CIWA protocol ordered    Diagnostic labs with evidence of alcohol intoxication leukocytosis, mild elevation in AST but otherwise unremarkable.  Evidence of cannabis abuse present.    Initial troponin T 8.  Repeat cardiac enzyme pending at the end of my shift    CT head shows no evidence of intracranial hemorrhage, skull fracture, mass, midline shift.  CT C-spine without acute fracture or subluxation.  CT maxillofacial bones without evidence of fracture or dislocation.  CT chest abdomen pelvis unremarkable without evidence of PE, aortic dissection or pneumonia or pneumothorax.    In the management and evaluation of the patient’s presenting complaint, the finding of renal cysts was found. It is not felt that this has a relationship to the current presenting complaint. However, this finding was verbally discussed with the patient and the importance of outpatient follow-up was stressed.      At the end of my shift still pending psychiatric evaluation by social work for possible detox. It has reached the end of my shift and results are still pending.  From this point onward patient care will be given by Dr. Diaz.  Please refer to her note for additional details regarding the remainder of the patient visit and disposition.        Procedure  Procedures     La Dennis PA-C  07/28/24 0185

## 2024-07-28 NOTE — PROGRESS NOTES
Attestation/Supervisory note for ACE Adame      The patient is a 64-year-old male presenting to the emergency department for evaluation of facial swelling with generalized malaise and fatigue.  The patient states that he does not really know when his symptoms started but he does remember noticing it today.  He states he does drink more than 3 pints of vodka each day.  He states he last drank just prior to coming to the emergency room.  He states that he does have frequent blackouts and he believes that he has fallen frequently but he does not know exactly when that was.  He does not remember if he hit his head or if he had any loss of consciousness with his recent falls.  He denies any headache or visual changes.  He denies any focal weakness or numbness.  He states that he always has some shortness of breath and dyspnea on exertion but he does not have any recent changes in those symptoms.  He denies any neck or back pain.  He denies any abdominal pain.  He denies any nausea or vomiting.  No diarrhea or constipation.  No urinary complaints.  He denies any homicidal or suicidal ideation.  No hallucinations.  He states that he does not believe that he is ready to go to detox at this time.  He denies any new products, detergents, medications and/or exposures.  All pertinent positives and negatives are recorded above.  All other systems reviewed and otherwise negative.  Vital signs with hypertension and tachycardia but otherwise within normal limits.  Physical exam with a well-nourished well-developed male with poor hygiene and disheveled appearance but no evidence of acute distress.  HEENT exam with bilateral facial edema with swelling of the upper and lower eyelids.  He also has widespread dental decay.  Pupils equal round reactive light.  He does not any midface instability.  He does not have any septal hematomas.  No hemotympanum.  He does not have any visible or palpable bony deformity of the scalp.  He denies  any focal midline neck or back pain with palpation.  No step-offs.  He does not Ruano gross motor, neurologic or vascular deficits on exam.  Pulses are equal bilaterally.  He has no evidence of airway compromise or respiratory distress.  Abdominal exam is benign.  He does have scattered abrasions and scabs on all 4 extremities.  No active bleeding at this time.  No visible or palpable bony deformity.      EKG with sinus tachycardia 111 bpm, normal axis, normal voltage, normal ST segment, and normal T waves      IV fluids, IV Pepcid, IV Benadryl, IV Solu-Medrol, IV banana bag and CIWA protocol ordered      Diagnostic labs with evidence of alcohol intoxication, lactic acidosis, leukocytosis, mildly elevated AST, evidence of substance abuse but otherwise unremarkable.      Initial lactic acid 2.6.  Repeat lactic 2.8      Initial troponin T of 8.  Repeat troponin T  8      Cultures were obtained and IV fluids and IV antibiotics were ordered for the sepsis bundle.      CT chest abdomen pelvis w IV contrast   Final Result   1. No acute abnormalities in the chest, abdomen, or pelvis   2. Severe atherosclerotic coronary artery calcifications   3. A few small renal cysts are suspected   4. Degenerative changes of the spine and grade 2 spondylolisthesis L4   on L5             MACRO:   None.        Signed by: Caridad Monroe 7/28/2024 3:54 PM   Dictation workstation:   [id number]      CT head wo IV contrast   Final Result   No acute intracranial abnormality.        MACRO:   None.        Signed by: Caridad Monroe 7/28/2024 2:48 PM   Dictation workstation:   [id number]      CT cervical spine wo IV contrast   Final Result        1. No acute bony abnormalities   2. Multilevel cervical spondylosis.        MACRO:   None        Signed by: Caridad Monroe 7/28/2024 2:56 PM   Dictation workstation:   [id number]      CT maxillofacial bones wo IV contrast   Final Result   No acute facial bone fracture visualized.        MACRO:   None         Signed by: Caridad Monroe 7/28/2024 3:02 PM   Dictation workstation:   [id number]         The patient does not have any evidence of a STEMI on EKG.  No events on telemetry.  No evidence of ischemia by cardiac enzymes.  He does not have any gross motor, neurologic or vascular deficits on exam.  He does have hypertension and tachycardia but vital signs otherwise unremarkable.  The patient does not have any visible or palpable bony deformity on exam.  CT head shows no evidence of intracranial hemorrhage, mass effect and/or fracture.  CT C-spine without evidence of fracture or dislocation.  CT maxillofacial without evidence of fracture or dislocation.  CT chest abdomen pelvis shows no acute abnormalities in the chest abdomen or pelvis.  There is severe atherosclerotic coronary artery calcifications and a few small renal cyst per the radiology reading but no evidence of dissection or PE.  No mass.  No evidence of pneumonia or pneumothorax.  No evidence of CHF.  Patient ultimately agreed to try to go to detox as he felt like he did not have any other place to go.  He states he recently was released from [facility] and he was previously staying with his sister but she kicked him out of her house.  The crisis team was consulted but the patient could not be placed for inpatient detox at this time due to his frequent blackouts and increasing lactic acid.  The cultures were obtained and IV fluids and IV and antibiotics were ordered for the sepsis bundle but the patient does not have any identifiable source of infection at this time.      Impression/diagnosis:  1.  Alcohol intoxication/abuse/dependence  2.  Frequent falls  3.  Dyspnea, dyspnea on exertion, chronic  4.  Facial edema  5.  Substance abuse  6.  Lactic acidosis      Critical care time of 35 minutes billed for management of alcohol dependence/intoxication with initiation of the CIWA protocol, monitoring the patient's telemetry, assessment of the patient for any traumatic  injury, consultation with the crisis team, consultation with the patient regarding his results and initiation of the sepsis bundle..  This time excludes time for billable procedures.      critical care time billed for by me is non concurrent with time billed for by RIGO Dennis      I personally saw the patient and made/approve the management plan and take responsibility for the patient management.      I independently interpreted the following study (S) EKG and diagnostic labs      I personally discussed the patient's management with the patient      I reviewed the results of the diagnostic labs and diagnostic imaging.  Formal radiology read was completed by the radiologist.      Itzel Diaz MD

## 2024-07-28 NOTE — ED TRIAGE NOTES
Woke up at approx 0500 today, swollen face, been drinking approx 3 pints of vodka daily for 4 days.  Has not taken anti depressants in approx 1 month. Reports dyspnea.

## 2024-07-28 NOTE — H&P
"Chief complaint: Facial swelling    History Of Present Illness  Malik Evans is a 64 y.o. male with a past medical history of hypertension, depression, and alcohol use disorder who presented to the emergency department with complaints of swelling.  Patient states that he recently was released from [facility] assisted.  Permanent address is at his sisters however he started drinking again and he was told to leave.  States that he has been sleeping in a shed for the last 2 days.  He woke up this morning and went to [facility].  States that when he went to the bathroom he noticed that his face was \"puffy.\"  Patient does report heavy alcohol use, drinks 3 pints of vodka a day.  Admits to frequent falling and blacking out.  He also follows with psychiatry for depression at [place] however states that he has been out of his medications for about a month.  Reports generalized malaise and fatigue.  Denies chest pain, shortness of breath, fevers, chills, nausea, or vomiting. No abdominal discomfort. Denies lightheadedness or dizziness. No dysuria.    In the ED: WBC was elevated at 16.  H&H stable.  Platelets 265.  Sodium 138, potassium 3.7, BUN/creatinine 11/0.8 which appears to be his baseline.  Magnesium was 1.9.  Glucose 97.  Patient had CT of the chest/abdomen/pelvis, cervical spine, head, and facial bones which were all unremarkable.  UA was positive for leukocytes.  Lactic acid was elevated at 2.6, repeat 2.8.  Talk screen was positive for cannabinoid, alcohol level was 0.288.  Troponin was 8, repeat 8.  Patient was given Benadryl, Pepcid, Solu-Medrol, IV fluids, folic acid, IV antibiotics, multivitamin, in the ED.  Admitted to [facility] for further evaluation and treatment.     Past Medical History  Past Medical History:   Diagnosis Date    Alcohol use     Depression     Hypertension        Surgical History  History reviewed. No pertinent surgical history.     Social History  He reports that he has been smoking " cigarettes. He does not have any smokeless tobacco history on file. He reports current alcohol use. He reports current drug use. Drug: Marijuana.    Family History  Family History   Problem Relation Name Age of Onset    Hypertension Other      Heart disease Other          Allergies  Patient has no known allergies.    Review of Systems   Constitutional:  Positive for appetite change and fatigue. Negative for fever.   HENT:  Negative for congestion and rhinorrhea.    Respiratory:  Negative for cough, chest tightness and shortness of breath.    Cardiovascular:  Negative for chest pain and palpitations.   Gastrointestinal:  Positive for constipation. Negative for abdominal distention, abdominal pain, diarrhea, nausea and vomiting.   Genitourinary:  Negative for dysuria and urgency.   Skin:         Facial swelling, scattered abrasions, bruises, and bug bites   Neurological:  Negative for dizziness, light-headedness and numbness.   All other systems reviewed and are negative.      Physical Exam  Vitals reviewed.   Constitutional:       Comments: Unkempt   HENT:      Head: Normocephalic and atraumatic.      Nose: Nose normal.      Mouth/Throat:      Mouth: Mucous membranes are moist.   Eyes:      Extraocular Movements: Extraocular movements intact.      Conjunctiva/sclera: Conjunctivae normal.   Cardiovascular:      Rate and Rhythm: Normal rate and regular rhythm.   Pulmonary:      Effort: Pulmonary effort is normal.      Breath sounds: Normal breath sounds. No wheezing, rhonchi or rales.   Abdominal:      General: Bowel sounds are normal.      Palpations: Abdomen is soft.      Tenderness: There is no abdominal tenderness.   Musculoskeletal:         General: Normal range of motion.      Cervical back: Normal range of motion and neck supple.   Skin:     General: Skin is warm and dry.      Capillary Refill: Capillary refill takes less than 2 seconds.      Comments: Facial swelling, scattered ecchymotic areas, abrasions, and  noted bug bites to upper extremities   Neurological:      General: No focal deficit present.      Mental Status: He is alert and oriented to person, place, and time.   Psychiatric:         Mood and Affect: Mood normal.         Behavior: Behavior normal.          Last Recorded Vitals  Blood pressure 127/85, pulse 79, temperature 36.5 °C (97.7 °F), temperature source Temporal, resp. rate 17, height 1.829 m (6'), weight 79.4 kg (175 lb), SpO2 99%.    Relevant Results  Lab Results   Component Value Date    GLUCOSE 97 07/28/2024    CALCIUM 8.5 07/28/2024     07/28/2024    K 3.7 07/28/2024    CO2 24 07/28/2024    CL 99 07/28/2024    BUN 11 07/28/2024    CREATININE 0.80 07/28/2024     Lab Results   Component Value Date    WBC 16.3 (H) 07/28/2024    HGB 15.1 07/28/2024    HCT 42.1 07/28/2024    MCV 90 07/28/2024     07/28/2024     CT chest abdomen pelvis w IV contrast  Result Date: 7/28/2024  1. No acute abnormalities in the chest, abdomen, or pelvis 2. Severe atherosclerotic coronary artery calcifications 3. A few small renal cysts are suspected 4. Degenerative changes of the spine and grade 2 spondylolisthesis L4 on L5     MACRO: None.   Signed by: Caridad Monroe 7/28/2024 3:54 PM Dictation workstation:   [id number]    CT maxillofacial bones wo IV contrast  Result Date: 7/28/2024  No acute facial bone fracture visualized.   MACRO: None   Signed by: Caridad Monroe 7/28/2024 3:02 PM Dictation workstation:   [id number]    CT cervical spine wo IV contrast  Result Date: 7/28/2024    1. No acute bony abnormalities 2. Multilevel cervical spondylosis.   MACRO: None   Signed by: Caridad Monroe 7/28/2024 2:56 PM Dictation workstation:   [id number]    CT head wo IV contrast  Result Date: 7/28/2024  No acute intracranial abnormality.   MACRO: None.   Signed by: Caridad Monroe 7/28/2024 2:48 PM Dictation workstation:   [id number]        Assessment/Plan   Principal Problem:    Urinary tract infection    Urinary tract  infection  UA positive, culture pending  IV ceftriaxone  Follow cultures    Leukocytosis  Elevated white count and lactic acid  May be reactive however will cover with antibiotics pending cultures  Continue IV fluids  CBC in the morning    Facial swelling  Noted facial edema  Patient did report frequent falling and blacking out when drinking alcohol  Given Benadryl, Pepcid, and Solu-Medrol in the ED  As needed Benadryl  Monitor close    Alcohol use disorder  With intoxication  Alcohol level 0.288  Drinks 3 pints of vodka a day  [facility]  Multivitamin, thiamine, folic acid    Depression  Follows with psychiatry at [place] however states that he has been off of his medications for a month  Consult psychiatry, appreciate recommendations    Hypertension  Has not seen his primary doctor for over a year, states that he believes he used to be on lisinopril for his blood pressure  Monitor blood pressure    Tobacco use  Declines nicotine patch  Counseled on cessation    Plan  Admit to observation  Monitor on telemetry  CIWA  Fall precautions  Continue antibiotics, follow cultures  CBC, BMP, and magnesium level in the morning  DVT prophylaxis: Lovenox  Consult psychiatry, appreciate recommendation               Maggie Crandall, COOKIE-CNP

## 2024-07-28 NOTE — CARE PLAN
1515 - SW attempted to complete meet and greet with pt. PT not in his room, pt at CT.  SW to attempt at another time.     1600 - SW attempted meet with pt at bedside for meet and greet.  Pt sleeping and did not respond to call of his name, no signs of distress.       Pt BAL .266, redraw will be at 2200.    Dina Dowling, MSW,LSW  [facility]

## 2024-07-29 PROBLEM — F10.10 ETOH ABUSE: Status: ACTIVE | Noted: 2024-07-29

## 2024-07-29 PROBLEM — Z59.00 HOMELESSNESS: Status: ACTIVE | Noted: 2024-07-29

## 2024-07-29 LAB
ANION GAP SERPL CALC-SCNC: 14 MMOL/L
ATRIAL RATE: 111 BPM
BUN SERPL-MCNC: 9 MG/DL (ref 8–25)
CALCIUM SERPL-MCNC: 8 MG/DL (ref 8.5–10.4)
CHLORIDE SERPL-SCNC: 102 MMOL/L (ref 97–107)
CO2 SERPL-SCNC: 23 MMOL/L (ref 24–31)
CREAT SERPL-MCNC: 0.7 MG/DL (ref 0.4–1.6)
EGFRCR SERPLBLD CKD-EPI 2021: >90 ML/MIN/1.73M*2
ERYTHROCYTE [DISTWIDTH] IN BLOOD BY AUTOMATED COUNT: 11.2 % (ref 11.5–14.5)
GLUCOSE SERPL-MCNC: 127 MG/DL (ref 65–99)
HCT VFR BLD AUTO: 38.9 % (ref 41–52)
HGB BLD-MCNC: 13.6 G/DL (ref 13.5–17.5)
HOLD SPECIMEN: NORMAL
MAGNESIUM SERPL-MCNC: 1.8 MG/DL (ref 1.6–3.1)
MCH RBC QN AUTO: 31.6 PG (ref 26–34)
MCHC RBC AUTO-ENTMCNC: 35 G/DL (ref 32–36)
MCV RBC AUTO: 90 FL (ref 80–100)
NRBC BLD-RTO: 0 /100 WBCS (ref 0–0)
P AXIS: 58 DEGREES
P OFFSET: 184 MS
P ONSET: 133 MS
PLATELET # BLD AUTO: 174 X10*3/UL (ref 150–450)
POTASSIUM SERPL-SCNC: 3.8 MMOL/L (ref 3.4–5.1)
PR INTERVAL: 158 MS
Q ONSET: 212 MS
QRS COUNT: 18 BEATS
QRS DURATION: 86 MS
QT INTERVAL: 326 MS
QTC CALCULATION(BAZETT): 443 MS
QTC FREDERICIA: 400 MS
R AXIS: 31 DEGREES
RBC # BLD AUTO: 4.31 X10*6/UL (ref 4.5–5.9)
SODIUM SERPL-SCNC: 139 MMOL/L (ref 133–145)
T AXIS: 44 DEGREES
T OFFSET: 375 MS
VENTRICULAR RATE: 111 BPM
WBC # BLD AUTO: 10.1 X10*3/UL (ref 4.4–11.3)

## 2024-07-29 PROCEDURE — 96372 THER/PROPH/DIAG INJ SC/IM: CPT | Performed by: NURSE PRACTITIONER

## 2024-07-29 PROCEDURE — 36415 COLL VENOUS BLD VENIPUNCTURE: CPT | Performed by: NURSE PRACTITIONER

## 2024-07-29 PROCEDURE — 82374 ASSAY BLOOD CARBON DIOXIDE: CPT | Performed by: NURSE PRACTITIONER

## 2024-07-29 PROCEDURE — 85027 COMPLETE CBC AUTOMATED: CPT | Performed by: NURSE PRACTITIONER

## 2024-07-29 PROCEDURE — 2500000004 HC RX 250 GENERAL PHARMACY W/ HCPCS (ALT 636 FOR OP/ED): Performed by: NURSE PRACTITIONER

## 2024-07-29 PROCEDURE — G0378 HOSPITAL OBSERVATION PER HR: HCPCS

## 2024-07-29 PROCEDURE — 2500000001 HC RX 250 WO HCPCS SELF ADMINISTERED DRUGS (ALT 637 FOR MEDICARE OP): Performed by: NURSE PRACTITIONER

## 2024-07-29 PROCEDURE — 96376 TX/PRO/DX INJ SAME DRUG ADON: CPT

## 2024-07-29 PROCEDURE — 83735 ASSAY OF MAGNESIUM: CPT | Performed by: NURSE PRACTITIONER

## 2024-07-29 RX ORDER — MIRTAZAPINE 15 MG/1
15 TABLET, FILM COATED ORAL NIGHTLY
Status: CANCELLED | OUTPATIENT
Start: 2024-07-29

## 2024-07-29 SDOH — HEALTH STABILITY: MENTAL HEALTH
HOW OFTEN DO YOU NEED TO HAVE SOMEONE HELP YOU WHEN YOU READ INSTRUCTIONS, PAMPHLETS, OR OTHER WRITTEN MATERIAL FROM YOUR DOCTOR OR PHARMACY?: SOMETIMES

## 2024-07-29 SDOH — SOCIAL STABILITY: SOCIAL NETWORK: IN A TYPICAL WEEK, HOW MANY TIMES DO YOU TALK ON THE PHONE WITH FAMILY, FRIENDS, OR NEIGHBORS?: ONCE A WEEK

## 2024-07-29 SDOH — SOCIAL STABILITY: SOCIAL INSECURITY
WITHIN THE LAST YEAR, HAVE TO BEEN RAPED OR FORCED TO HAVE ANY KIND OF SEXUAL ACTIVITY BY YOUR PARTNER OR EX-PARTNER?: NO

## 2024-07-29 SDOH — ECONOMIC STABILITY: HOUSING INSECURITY: AT ANY TIME IN THE PAST 12 MONTHS, WERE YOU HOMELESS OR LIVING IN A SHELTER (INCLUDING NOW)?: YES

## 2024-07-29 SDOH — ECONOMIC STABILITY: INCOME INSECURITY
IN THE PAST 12 MONTHS, HAS THE ELECTRIC, GAS, OIL, OR WATER COMPANY THREATENED TO SHUT OFF SERVICE IN YOUR HOME?: PATIENT UNABLE TO ANSWER

## 2024-07-29 SDOH — SOCIAL STABILITY: SOCIAL INSECURITY
WITHIN THE LAST YEAR, HAVE YOU BEEN KICKED, HIT, SLAPPED, OR OTHERWISE PHYSICALLY HURT BY YOUR PARTNER OR EX-PARTNER?: NO

## 2024-07-29 SDOH — ECONOMIC STABILITY: FOOD INSECURITY: WITHIN THE PAST 12 MONTHS, YOU WORRIED THAT YOUR FOOD WOULD RUN OUT BEFORE YOU GOT MONEY TO BUY MORE.: SOMETIMES TRUE

## 2024-07-29 SDOH — ECONOMIC STABILITY: INCOME INSECURITY: HOW HARD IS IT FOR YOU TO PAY FOR THE VERY BASICS LIKE FOOD, HOUSING, MEDICAL CARE, AND HEATING?: VERY HARD

## 2024-07-29 SDOH — ECONOMIC STABILITY: FOOD INSECURITY: WITHIN THE PAST 12 MONTHS, THE FOOD YOU BOUGHT JUST DIDN'T LAST AND YOU DIDN'T HAVE MONEY TO GET MORE.: SOMETIMES TRUE

## 2024-07-29 SDOH — ECONOMIC STABILITY: INCOME INSECURITY: IN THE LAST 12 MONTHS, WAS THERE A TIME WHEN YOU WERE NOT ABLE TO PAY THE MORTGAGE OR RENT ON TIME?: YES

## 2024-07-29 SDOH — SOCIAL STABILITY: SOCIAL NETWORK: HOW OFTEN DO YOU ATTENT MEETINGS OF THE CLUB OR ORGANIZATION YOU BELONG TO?: NEVER

## 2024-07-29 SDOH — SOCIAL STABILITY: SOCIAL NETWORK: ARE YOU MARRIED, WIDOWED, DIVORCED, SEPARATED, NEVER MARRIED, OR LIVING WITH A PARTNER?: NEVER MARRIED

## 2024-07-29 SDOH — SOCIAL STABILITY: SOCIAL NETWORK
DO YOU BELONG TO ANY CLUBS OR ORGANIZATIONS SUCH AS CHURCH GROUPS UNIONS, FRATERNAL OR ATHLETIC GROUPS, OR SCHOOL GROUPS?: NO

## 2024-07-29 SDOH — SOCIAL STABILITY: SOCIAL INSECURITY: WITHIN THE LAST YEAR, HAVE YOU BEEN AFRAID OF YOUR PARTNER OR EX-PARTNER?: NO

## 2024-07-29 SDOH — HEALTH STABILITY: MENTAL HEALTH: HOW OFTEN DO YOU HAVE 6 OR MORE DRINKS ON ONE OCCASION?: DAILY OR ALMOST DAILY

## 2024-07-29 SDOH — SOCIAL STABILITY: SOCIAL INSECURITY: WITHIN THE LAST YEAR, HAVE YOU BEEN HUMILIATED OR EMOTIONALLY ABUSED IN OTHER WAYS BY YOUR PARTNER OR EX-PARTNER?: NO

## 2024-07-29 SDOH — HEALTH STABILITY: MENTAL HEALTH: HOW OFTEN DO YOU HAVE A DRINK CONTAINING ALCOHOL?: 4 OR MORE TIMES A WEEK

## 2024-07-29 SDOH — SOCIAL STABILITY: SOCIAL NETWORK: HOW OFTEN DO YOU ATTEND CHURCH OR RELIGIOUS SERVICES?: NEVER

## 2024-07-29 SDOH — ECONOMIC STABILITY: TRANSPORTATION INSECURITY
IN THE PAST 12 MONTHS, HAS LACK OF TRANSPORTATION KEPT YOU FROM MEETINGS, WORK, OR FROM GETTING THINGS NEEDED FOR DAILY LIVING?: YES

## 2024-07-29 SDOH — SOCIAL STABILITY: SOCIAL NETWORK: HOW OFTEN DO YOU GET TOGETHER WITH FRIENDS OR RELATIVES?: NEVER

## 2024-07-29 SDOH — HEALTH STABILITY: MENTAL HEALTH: HOW MANY STANDARD DRINKS CONTAINING ALCOHOL DO YOU HAVE ON A TYPICAL DAY?: 10 OR MORE

## 2024-07-29 SDOH — HEALTH STABILITY: PHYSICAL HEALTH: ON AVERAGE, HOW MANY MINUTES DO YOU ENGAGE IN EXERCISE AT THIS LEVEL?: 0 MIN

## 2024-07-29 SDOH — HEALTH STABILITY: PHYSICAL HEALTH: ON AVERAGE, HOW MANY DAYS PER WEEK DO YOU ENGAGE IN MODERATE TO STRENUOUS EXERCISE (LIKE A BRISK WALK)?: 0 DAYS

## 2024-07-29 SDOH — ECONOMIC STABILITY: HOUSING INSECURITY: IN THE PAST 12 MONTHS, HOW MANY TIMES HAVE YOU MOVED WHERE YOU WERE LIVING?: 2

## 2024-07-29 ASSESSMENT — LIFESTYLE VARIABLES
TREMOR: 3
VISUAL DISTURBANCES: NOT PRESENT
PULSE: 70
ANXIETY: 3
HEADACHE, FULLNESS IN HEAD: NOT PRESENT
AGITATION: NORMAL ACTIVITY
ORIENTATION AND CLOUDING OF SENSORIUM: ORIENTED AND CAN DO SERIAL ADDITIONS
ANXIETY: 5
TOTAL SCORE: 6
TOTAL SCORE: 7
PAROXYSMAL SWEATS: BARELY PERCEPTIBLE SWEATING, PALMS MOIST
AGITATION: NORMAL ACTIVITY
ORIENTATION AND CLOUDING OF SENSORIUM: ORIENTED AND CAN DO SERIAL ADDITIONS
AUDITORY DISTURBANCES: NOT PRESENT
AUDITORY DISTURBANCES: NOT PRESENT
HEADACHE, FULLNESS IN HEAD: NOT PRESENT
ANXIETY: MILDLY ANXIOUS
HEADACHE, FULLNESS IN HEAD: NOT PRESENT
NAUSEA AND VOMITING: NO NAUSEA AND NO VOMITING
TOTAL SCORE: 7
PAROXYSMAL SWEATS: NO SWEAT VISIBLE
TOTAL SCORE: 1
VISUAL DISTURBANCES: NOT PRESENT
ORIENTATION AND CLOUDING OF SENSORIUM: ORIENTED AND CAN DO SERIAL ADDITIONS
ANXIETY: 3
NAUSEA AND VOMITING: NO NAUSEA AND NO VOMITING
ORIENTATION AND CLOUDING OF SENSORIUM: ORIENTED AND CAN DO SERIAL ADDITIONS
ORIENTATION AND CLOUDING OF SENSORIUM: ORIENTED AND CAN DO SERIAL ADDITIONS
AGITATION: NORMAL ACTIVITY
HEADACHE, FULLNESS IN HEAD: NOT PRESENT
NAUSEA AND VOMITING: NO NAUSEA AND NO VOMITING
PAROXYSMAL SWEATS: NO SWEAT VISIBLE
ANXIETY: 5
TOTAL SCORE: 9
PULSE: 68
VISUAL DISTURBANCES: NOT PRESENT
PAROXYSMAL SWEATS: NO SWEAT VISIBLE
TREMOR: MODERATE, WITH PATIENT'S ARMS EXTENDED
AUDITORY DISTURBANCES: NOT PRESENT
VISUAL DISTURBANCES: NOT PRESENT
AUDITORY DISTURBANCES: NOT PRESENT
VISUAL DISTURBANCES: NOT PRESENT
TOTAL SCORE: 9
TREMOR: MODERATE, WITH PATIENT'S ARMS EXTENDED
HEADACHE, FULLNESS IN HEAD: NOT PRESENT
TREMOR: MODERATE, WITH PATIENT'S ARMS EXTENDED
NAUSEA AND VOMITING: NO NAUSEA AND NO VOMITING
TREMOR: MODERATE, WITH PATIENT'S ARMS EXTENDED
VISUAL DISTURBANCES: NOT PRESENT
AGITATION: NORMAL ACTIVITY
ORIENTATION AND CLOUDING OF SENSORIUM: ORIENTED AND CAN DO SERIAL ADDITIONS
NAUSEA AND VOMITING: NO NAUSEA AND NO VOMITING
ANXIETY: 5
ANXIETY: 5
PAROXYSMAL SWEATS: NO SWEAT VISIBLE
TOTAL SCORE: 7
PULSE: 86
VISUAL DISTURBANCES: NOT PRESENT
TREMOR: MODERATE, WITH PATIENT'S ARMS EXTENDED
AUDITORY DISTURBANCES: NOT PRESENT
BLOOD PRESSURE: 134/72
PAROXYSMAL SWEATS: BARELY PERCEPTIBLE SWEATING, PALMS MOIST
AGITATION: NORMAL ACTIVITY
TOTAL SCORE: 3
HEADACHE, FULLNESS IN HEAD: NOT PRESENT
PAROXYSMAL SWEATS: NO SWEAT VISIBLE
NAUSEA AND VOMITING: NO NAUSEA AND NO VOMITING
HEADACHE, FULLNESS IN HEAD: NOT PRESENT
SKIP TO QUESTIONS 9-10: 0
ORIENTATION AND CLOUDING OF SENSORIUM: ORIENTED AND CAN DO SERIAL ADDITIONS
ORIENTATION AND CLOUDING OF SENSORIUM: ORIENTED AND CAN DO SERIAL ADDITIONS
HEADACHE, FULLNESS IN HEAD: NOT PRESENT
BLOOD PRESSURE: 150/86
ANXIETY: 3
AUDITORY DISTURBANCES: NOT PRESENT
ORIENTATION AND CLOUDING OF SENSORIUM: ORIENTED AND CAN DO SERIAL ADDITIONS
TREMOR: 3
ANXIETY: 3
AUDITORY DISTURBANCES: NOT PRESENT
AUDITORY DISTURBANCES: NOT PRESENT
AGITATION: NORMAL ACTIVITY
HEADACHE, FULLNESS IN HEAD: NOT PRESENT
AUDITORY DISTURBANCES: NOT PRESENT
ANXIETY: NO ANXIETY, AT EASE
TOTAL SCORE: 7
PAROXYSMAL SWEATS: NO SWEAT VISIBLE
TREMOR: 3
ORIENTATION AND CLOUDING OF SENSORIUM: ORIENTED AND CAN DO SERIAL ADDITIONS
AGITATION: NORMAL ACTIVITY
HEADACHE, FULLNESS IN HEAD: NOT PRESENT
HEADACHE, FULLNESS IN HEAD: NOT PRESENT
VISUAL DISTURBANCES: NOT PRESENT
ORIENTATION AND CLOUDING OF SENSORIUM: ORIENTED AND CAN DO SERIAL ADDITIONS
AGITATION: NORMAL ACTIVITY
AGITATION: NORMAL ACTIVITY
ANXIETY: 2
VISUAL DISTURBANCES: NOT PRESENT
AGITATION: NORMAL ACTIVITY
PAROXYSMAL SWEATS: BARELY PERCEPTIBLE SWEATING, PALMS MOIST
PAROXYSMAL SWEATS: NO SWEAT VISIBLE
NAUSEA AND VOMITING: NO NAUSEA AND NO VOMITING
NAUSEA AND VOMITING: NO NAUSEA AND NO VOMITING
AUDITORY DISTURBANCES: NOT PRESENT
AUDIT-C TOTAL SCORE: 12
TOTAL SCORE: 9
NAUSEA AND VOMITING: NO NAUSEA AND NO VOMITING
TREMOR: NOT VISIBLE, BUT CAN BE FELT FINGERTIP TO FINGERTIP
TREMOR: 3
PAROXYSMAL SWEATS: BARELY PERCEPTIBLE SWEATING, PALMS MOIST
TOTAL SCORE: 9
NAUSEA AND VOMITING: NO NAUSEA AND NO VOMITING
NAUSEA AND VOMITING: NO NAUSEA AND NO VOMITING
PULSE: 84
AUDITORY DISTURBANCES: NOT PRESENT
AGITATION: NORMAL ACTIVITY
TREMOR: 2
BLOOD PRESSURE: 149/80

## 2024-07-29 ASSESSMENT — COGNITIVE AND FUNCTIONAL STATUS - GENERAL
DAILY ACTIVITIY SCORE: 24
MOBILITY SCORE: 24

## 2024-07-29 ASSESSMENT — PAIN - FUNCTIONAL ASSESSMENT: PAIN_FUNCTIONAL_ASSESSMENT: 0-10

## 2024-07-29 ASSESSMENT — PAIN SCALES - GENERAL
PAINLEVEL_OUTOF10: 0 - NO PAIN
PAINLEVEL_OUTOF10: 0 - NO PAIN

## 2024-07-29 NOTE — PROGRESS NOTES
TCC met with patient at bedside. Pt is currently homeless. Pt was living with sister but has a restraining order against him due to domestic violence charges against him from sister. Pt does not use oxygen at home or on dialysis. Pt doesn't drive. Pt smokes 1/2 to 1 ppd cigarettes. Pt drinks at least 3 24oz beer daily or vodka 3 pints daily. Pt occasionally smokes marijuana. Pt is not a diabetic. Pt does not have a PCP. Pt normally gets his med from [facility] in [place] but hasn't filled his lisinopril in over a year. Pt is not a . Pt does not have a LW or POA. Pt's sister will not take patient back in because he is drinking. Pt states he can go stay with a friend. [facility] will give resources for detox facilities and AA meetings, housing resources and lifeline, 211 to call for homeless shelter.     DISCHARGE PLAN TO RETURN HOME WHEN MEDICALLY CLEAR.        07/29/24 0367   Discharge Planning   Living Arrangements Friends   Support Systems Friends/neighbors;Family members   Assistance Needed yes   Type of Residence Homeless   Do you have animals or pets at home? No   Who is requesting discharge planning? Provider   Home or Post Acute Services None   Expected Discharge Disposition Home   Does the patient need discharge transport arranged? Yes   RoundTrip coordination needed? Yes   Has discharge transport been arranged? No   Financial Resource Strain   How hard is it for you to pay for the very basics like food, housing, medical care, and heating? Very Hard   Housing Stability   In the last 12 months, was there a time when you were not able to pay the mortgage or rent on time? Y   In the past 12 months, how many times have you moved where you were living? 2   At any time in the past 12 months, were you homeless or living in a shelter (including now)? Y   Transportation Needs   In the past 12 months, has lack of transportation kept you from meetings, work, or from getting things needed for daily living? Yes

## 2024-07-29 NOTE — CARE PLAN
The patient's goals for the shift include rest    The clinical goals for the shift include CIWAA

## 2024-07-29 NOTE — CONSULTS
Nutrition Assessement Note    Nutrition Assessment    Reason for Assessment: Admission nursing screening (MST 2)    Spoke with pt at bedside. Pt reported that he did not eat for the past 2 days but started eating last night/ this morning. Pt stated that his appetite is good and ate breakfast this morning when he usually does not eat breakfast. Pt declined nutritional supplement since he has good appetite. Pt reported that he walks everywhere and does not drive.     Reason for Hospital Admission:  Malik Evans is a 64 y.o. male who is admitted for urinary tract infection.     Past Medical History:   Diagnosis Date    Alcohol use     Depression     Hypertension       History reviewed. No pertinent surgical history.    Nutrition History:  Food and Nutrient History: Pt reported good appetite     Food Allergies/Intolerances:  None  GI Symptoms: None  Oral Problems: None    Anthropometrics:  Ht: 182.9 cm (6'), Wt: 79.4 kg (175 lb), BMI: 23.73             Weight Change:  Daily Weight  07/28/24 : 79.4 kg (175 lb)  05/02/24 : 81.6 kg (180 lb)  06/29/23 : 89.4 kg (197 lb 3.2 oz)  01/12/23 : 90.7 kg (200 lb)  12/05/22 : 88.9 kg (196 lb)  02/23/22 : 92.5 kg (204 lb)  05/03/21 : 93 kg (205 lb)  01/22/21 : 93 kg (205 lb)     Weight History / % Weight Change: pt reported losing 15# in one month due to decreased appetite due to not feeling like eating. UBW was 190#             Nutrition Focused Physical Exam Findings:                       Nutrition Significant Labs:  Lab Results   Component Value Date    WBC 10.1 07/29/2024    HGB 13.6 07/29/2024    HCT 38.9 (L) 07/29/2024     07/29/2024    CHOL 304 (H) 01/19/2023    TRIG 471 (H) 01/19/2023    HDL 48 01/19/2023    LDLDIRECT 178 (H) 01/19/2023    ALT 40 07/28/2024    AST 61 (H) 07/28/2024     07/29/2024    K 3.8 07/29/2024     07/29/2024    CREATININE 0.70 07/29/2024    BUN 9 07/29/2024    CO2 23 (L) 07/29/2024    PSA 3.9 08/19/2020    INR 1.0 07/28/2024     HGBA1C 5.5 09/11/2020     Nutrition Specific Medications:  cefTRIAXone, 1 g, intravenous, q24h  enoxaparin, 40 mg, subcutaneous, Daily  folic acid, 1 mg, oral, Daily  hydrocortisone, , Topical, BID  multivitamin with minerals, 1 tablet, oral, Daily  [START ON 7/31/2024] thiamine, 100 mg, oral, Daily  thiamine, 100 mg, intravenous, Daily      Dietary Orders (From admission, onward)       Start     Ordered    07/28/24 1918  Adult diet Regular  Diet effective now        Question:  Diet type  Answer:  Regular    07/28/24 1917                  Estimated Needs:   Estimated Energy Needs  Total Energy Estimated Needs (kCal): 1985 kCal  Total Estimated Energy Need per Day (kCal/kg): 25 kCal/kg  Method for Estimating Needs: actual wt    Estimated Protein Needs  Total Protein Estimated Needs (g): 79 g  Total Protein Estimated Needs (g/kg): 1 g/kg  Method for Estimating Needs: actual wt    Estimated Fluid Needs  Total Fluid Estimated Needs (mL): 1985 mL  Method for Estimating Needs: 1 mL/kcal        Nutrition Diagnosis   Nutrition Diagnosis:       Nutrition Diagnosis  Patient has Nutrition Diagnosis: Yes  Diagnosis Status (1): New  Nutrition Diagnosis 1: Unintended weight loss  Related to (1): psychological causes  As Evidenced by (1): pt reported losing 15# in one month       Nutrition Interventions/Recommendations   Nutrition Interventions and Recommendations:    Nutrition Prescription:  Individualized Nutrition Prescription Provided for : 1985 kcals, 79 g protein via diet    Nutrition Interventions:   Food and/or Nutrient Delivery Interventions  Interventions: Meals and snacks  Meals and Snacks: General healthful diet  Goal: provide diet as ordered  Additional Interventions: pt declined nutritional supplements since appetite is good    Education Documentation  No documentation found.           Nutrition Monitoring and Evaluation   Monitoring/Evaluation:   Food/Nutrient Related History Monitoring  Monitoring and Evaluation  Plan: Energy intake  Energy Intake: Estimated energy intake  Criteria: pt to consume >/= 75% estimated needs         Time Spent/Follow-up:   Follow Up  Time Spent (min): 30 minutes  Last Date of Nutrition Visit: 07/29/24  Nutrition Follow-Up Needed?: 7-10 days  Follow up Comment: 8/5/24

## 2024-07-29 NOTE — PROGRESS NOTES
Spiritual Care Visit    Clinical Encounter Type  Visited With: Patient not available  Routine Visit: Introduction     Capo Daugherty
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
## 2024-07-29 NOTE — PROGRESS NOTES
Malik Eavns is a 64 y.o. male on day 0 of admission presenting with Urinary tract infection.      Subjective   Patient seen and evaluated on rounds this morning.  He is sitting up at bedside.  States he is feeling much better.  Facial swelling has resolved.  Denies any nausea or vomiting.  AVSS.  No acute events overnight       Objective     Last Recorded Vitals  /81 (BP Location: Right arm, Patient Position: Lying)   Pulse 86   Temp 37.1 °C (98.8 °F) (Oral)   Resp 17   Wt 79.4 kg (175 lb)   SpO2 99%   Intake/Output last 3 Shifts:    Intake/Output Summary (Last 24 hours) at 7/29/2024 1546  Last data filed at 7/29/2024 0800  Gross per 24 hour   Intake 2622 ml   Output --   Net 2622 ml       Admission Weight  Weight: 79.4 kg (175 lb) (07/28/24 1314)    Daily Weight  07/28/24 : 79.4 kg (175 lb)    Image Results  ECG 12 lead  Sinus tachycardia  Otherwise normal ECG  When compared with ECG of 28-JUL-2024 13:15, (unconfirmed)  QRS axis Shifted left  Confirmed by Pedrito Lam ([id number]) on 7/29/2024 12:28:07 PM      Physical Exam  Vitals and nursing note reviewed.   Constitutional:       General: He is not in acute distress.     Appearance: He is normal weight. He is not toxic-appearing.   Eyes:      Extraocular Movements: Extraocular movements intact.      Pupils: Pupils are equal, round, and reactive to light.   Cardiovascular:      Rate and Rhythm: Normal rate and regular rhythm.      Heart sounds: Normal heart sounds.   Pulmonary:      Effort: Pulmonary effort is normal.      Breath sounds: Normal breath sounds.   Abdominal:      General: There is no distension.      Palpations: Abdomen is soft.      Tenderness: There is no abdominal tenderness.   Musculoskeletal:         General: Normal range of motion.      Cervical back: Normal range of motion and neck supple.      Right lower leg: No edema.      Left lower leg: No edema.   Skin:     General: Skin is warm and dry.   Neurological:      General: No focal  deficit present.      Mental Status: He is alert and oriented to person, place, and time.      Cranial Nerves: No cranial nerve deficit.      Motor: No weakness.      Gait: Gait normal.   Psychiatric:         Mood and Affect: Mood normal.         Behavior: Behavior normal.         Relevant Results               Scheduled medications  cefTRIAXone, 1 g, intravenous, q24h  enoxaparin, 40 mg, subcutaneous, Daily  folic acid, 1 mg, oral, Daily  hydrocortisone, , Topical, BID  multivitamin with minerals, 1 tablet, oral, Daily  [START ON 7/31/2024] thiamine, 100 mg, oral, Daily  thiamine, 100 mg, intravenous, Daily      Continuous medications     PRN medications  PRN medications: acetaminophen **OR** acetaminophen **OR** acetaminophen, acetaminophen **OR** acetaminophen **OR** acetaminophen, diphenhydrAMINE, LORazepam **OR** LORazepam **OR** LORazepam, magnesium hydroxide, melatonin, ondansetron **OR** ondansetron    Results for orders placed or performed during the hospital encounter of 07/28/24 (from the past 24 hour(s))   Blood Gas Lactic Acid, Venous   Result Value Ref Range    POCT Lactate, Venous 2.8 (H) 0.4 - 2.0 mmol/L   Troponin T   Result Value Ref Range    Troponin T, High Sensitivity 8 <=14 ng/L   Magnesium   Result Value Ref Range    Magnesium 1.90 1.60 - 3.10 mg/dL   Ethanol   Result Value Ref Range    Alcohol 0.093 (H) 0.000 - 0.010 g/dL   Basic metabolic panel   Result Value Ref Range    Glucose 127 (H) 65 - 99 mg/dL    Sodium 139 133 - 145 mmol/L    Potassium 3.8 3.4 - 5.1 mmol/L    Chloride 102 97 - 107 mmol/L    Bicarbonate 23 (L) 24 - 31 mmol/L    Urea Nitrogen 9 8 - 25 mg/dL    Creatinine 0.70 0.40 - 1.60 mg/dL    eGFR >90 >60 mL/min/1.73m*2    Calcium 8.0 (L) 8.5 - 10.4 mg/dL    Anion Gap 14 <=19 mmol/L   Magnesium   Result Value Ref Range    Magnesium 1.80 1.60 - 3.10 mg/dL   CBC   Result Value Ref Range    WBC 10.1 4.4 - 11.3 x10*3/uL    nRBC 0.0 0.0 - 0.0 /100 WBCs    RBC 4.31 (L) 4.50 - 5.90  x10*6/uL    Hemoglobin 13.6 13.5 - 17.5 g/dL    Hematocrit 38.9 (L) 41.0 - 52.0 %    MCV 90 80 - 100 fL    MCH 31.6 26.0 - 34.0 pg    MCHC 35.0 32.0 - 36.0 g/dL    RDW 11.2 (L) 11.5 - 14.5 %    Platelets 174 150 - 450 x10*3/uL       ECG 12 lead  Result Date: 7/29/2024  Sinus tachycardia Otherwise normal ECG When compared with ECG of 28-JUL-2024 13:15, (unconfirmed) QRS axis Shifted left Confirmed by Pedrito Lam ([id number]) on 7/29/2024 12:28:07 PM    CT chest abdomen pelvis w IV contrast  Result Date: 7/28/2024  FINDINGS: Chest: The lungs appear clear. No infiltrates or effusions. No sign of pneumothorax. Minimal subpleural scarring noted. Thyroid gland unremarkable. Vascular atherosclerotic calcifications are seen. Severe atherosclerotic coronary artery calcifications are noted. Heart size is normal. No pericardial effusion. Aorta normal in size.   Abdomen: The liver, spleen, pancreas, and adrenal glands appear normal. There is no hydronephrosis. There are no renal calculi. A few small renal cortical hypodensities probably represent cysts most likely. Marked atherosclerotic calcifications are seen in the aorta. There is no bowel obstruction, free fluid or free air. There is no adenopathy.   Pelvis: Urinary bladder is unremarkable. There are prostate calcifications. There is no free fluid. No adenopathy. No CT signs of appendicitis.   There is grade 2 spondylolisthesis of L4 on L5 with severe disc space narrowing at L4-5 and L5-S1. Small sclerotic focus in the intertrochanteric region of the left femur is stable since the 2014 exam possibly a bone island.     1. No acute abnormalities in the chest, abdomen, or pelvis 2. Severe atherosclerotic coronary artery calcifications 3. A few small renal cysts are suspected 4. Degenerative changes of the spine and grade 2 spondylolisthesis L4 on L5               CT maxillofacial bones wo IV contrast  Result Date: 7/28/2024   FINDINGS: Orbits: The bony orbits are intact. The  orbital contents are unremarkable.   Facial Bones: There is no displaced facial bone fracture.   Mandible/Temporomandibular Joints: Visualized portions of mandible and bilateral temporomandibular joints are intact. Mandible shows hypertrophic bone medially consistent with torus mandibularis.   Paranasal Sinuses/Mastoids: Mild paranasal sinus mucosal thickening.   Soft tissues: Right facial and periorbital soft tissue swelling noted   Dental: Marked dental disease with maxillary and mandibular periapical abscesses.     No acute facial bone fracture visualized.       CT cervical spine wo IV contrast  Result Date: 7/28/2024     FINDINGS: No fracture or traumatic subluxation. No prevertebral soft tissue swelling.   Large anterior osteophytic ridges are seen at C4-5 and C5-6. There is disc space narrowing at C5-6 with a posterior osteophyte disc complex also noted effacing the ventral thecal sac. There is uncovertebral joint hypertrophy bilaterally at C5-6. Facet hypertrophy is seen at several levels.   Atherosclerotic carotid artery calcifications are noted.       1. No acute bony abnormalities 2. Multilevel cervical spondylosis.         CT head wo IV contrast  Result Date: 7/28/2024   FINDINGS: There is no mass effect, hemorrhage, or infarct. The ventricles appear normal. Gray-white differentiation is maintained.   The visualized paranasal sinuses appear clear. The visualized portions of the orbits are unremarkable. Right facial soft tissue swelling.       No acute intracranial abnormality.        Assessment/Plan      Principal Problem:    Urinary tract infection  Active Problems:    ETOH abuse    Homelessness      UTI  -- Will transition to oral medications in the morning  -- Leukocytosis resolved    EtOH abuse  -- Continue CIWA  --No overt signs symptoms of withdrawal  -- Psych to evaluate  -- Continue daily thiamine, multivitamin, folic acid    Homelessness  -- Case management/social work to evaluate patient's  case         [facility] DC TOMORROW AM 7/30 -> ON ORAL ANTIBX WHEN SAFE DC PLAN IN PLACE       Donna Mahoney, APRN-CNP

## 2024-07-29 NOTE — PROGRESS NOTES
07/29/24 1624   Current Planned Discharge Disposition   Current Planned Discharge Disposition Home  (HOMELESS)

## 2024-07-30 ENCOUNTER — PHARMACY VISIT (OUTPATIENT)
Dept: PHARMACY | Facility: CLINIC | Age: 64
End: 2024-07-30
Payer: MEDICAID

## 2024-07-30 VITALS
HEART RATE: 84 BPM | OXYGEN SATURATION: 98 % | DIASTOLIC BLOOD PRESSURE: 95 MMHG | BODY MASS INDEX: 23.7 KG/M2 | WEIGHT: 175 LBS | SYSTOLIC BLOOD PRESSURE: 155 MMHG | TEMPERATURE: 97.3 F | RESPIRATION RATE: 17 BRPM | HEIGHT: 72 IN

## 2024-07-30 PROBLEM — Z59.00 HOMELESSNESS: Status: RESOLVED | Noted: 2024-07-29 | Resolved: 2024-07-30

## 2024-07-30 LAB
ALBUMIN SERPL-MCNC: 3.9 G/DL (ref 3.5–5)
ALP BLD-CCNC: 83 U/L (ref 35–125)
ALT SERPL-CCNC: 33 U/L (ref 5–40)
ANION GAP SERPL CALC-SCNC: 14 MMOL/L
AST SERPL-CCNC: 32 U/L (ref 5–40)
BACTERIA UR CULT: NORMAL
BILIRUB SERPL-MCNC: 0.8 MG/DL (ref 0.1–1.2)
BUN SERPL-MCNC: 12 MG/DL (ref 8–25)
CALCIUM SERPL-MCNC: 8.7 MG/DL (ref 8.5–10.4)
CHLORIDE SERPL-SCNC: 100 MMOL/L (ref 97–107)
CO2 SERPL-SCNC: 25 MMOL/L (ref 24–31)
CREAT SERPL-MCNC: 0.8 MG/DL (ref 0.4–1.6)
EGFRCR SERPLBLD CKD-EPI 2021: >90 ML/MIN/1.73M*2
ERYTHROCYTE [DISTWIDTH] IN BLOOD BY AUTOMATED COUNT: 11.6 % (ref 11.5–14.5)
GLUCOSE SERPL-MCNC: 89 MG/DL (ref 65–99)
HCT VFR BLD AUTO: 42.3 % (ref 41–52)
HGB BLD-MCNC: 15.1 G/DL (ref 13.5–17.5)
MCH RBC QN AUTO: 32.2 PG (ref 26–34)
MCHC RBC AUTO-ENTMCNC: 35.7 G/DL (ref 32–36)
MCV RBC AUTO: 90 FL (ref 80–100)
NRBC BLD-RTO: 0 /100 WBCS (ref 0–0)
PLATELET # BLD AUTO: 232 X10*3/UL (ref 150–450)
POTASSIUM SERPL-SCNC: 3.5 MMOL/L (ref 3.4–5.1)
PROT SERPL-MCNC: 6.6 G/DL (ref 5.9–7.9)
RBC # BLD AUTO: 4.69 X10*6/UL (ref 4.5–5.9)
SODIUM SERPL-SCNC: 139 MMOL/L (ref 133–145)
WBC # BLD AUTO: 14.1 X10*3/UL (ref 4.4–11.3)

## 2024-07-30 PROCEDURE — G0378 HOSPITAL OBSERVATION PER HR: HCPCS

## 2024-07-30 PROCEDURE — 85027 COMPLETE CBC AUTOMATED: CPT | Performed by: NURSE PRACTITIONER

## 2024-07-30 PROCEDURE — 84075 ASSAY ALKALINE PHOSPHATASE: CPT | Performed by: NURSE PRACTITIONER

## 2024-07-30 PROCEDURE — 2500000004 HC RX 250 GENERAL PHARMACY W/ HCPCS (ALT 636 FOR OP/ED): Performed by: NURSE PRACTITIONER

## 2024-07-30 PROCEDURE — 36415 COLL VENOUS BLD VENIPUNCTURE: CPT | Performed by: NURSE PRACTITIONER

## 2024-07-30 PROCEDURE — 2500000001 HC RX 250 WO HCPCS SELF ADMINISTERED DRUGS (ALT 637 FOR MEDICARE OP): Performed by: NURSE PRACTITIONER

## 2024-07-30 PROCEDURE — RXMED WILLOW AMBULATORY MEDICATION CHARGE

## 2024-07-30 PROCEDURE — 96376 TX/PRO/DX INJ SAME DRUG ADON: CPT

## 2024-07-30 RX ORDER — CEPHALEXIN 500 MG/1
500 CAPSULE ORAL 3 TIMES DAILY
Qty: 9 CAPSULE | Refills: 0 | Status: SHIPPED | OUTPATIENT
Start: 2024-07-30 | End: 2024-08-02

## 2024-07-30 ASSESSMENT — PAIN - FUNCTIONAL ASSESSMENT: PAIN_FUNCTIONAL_ASSESSMENT: 0-10

## 2024-07-30 ASSESSMENT — LIFESTYLE VARIABLES
ORIENTATION AND CLOUDING OF SENSORIUM: ORIENTED AND CAN DO SERIAL ADDITIONS
AUDITORY DISTURBANCES: NOT PRESENT
ANXIETY: NO ANXIETY, AT EASE
VISUAL DISTURBANCES: NOT PRESENT
AUDITORY DISTURBANCES: NOT PRESENT
TREMOR: NOT VISIBLE, BUT CAN BE FELT FINGERTIP TO FINGERTIP
TREMOR: NO TREMOR
TOTAL SCORE: 1
ORIENTATION AND CLOUDING OF SENSORIUM: ORIENTED AND CAN DO SERIAL ADDITIONS
HEADACHE, FULLNESS IN HEAD: NOT PRESENT
VISUAL DISTURBANCES: NOT PRESENT
TOTAL SCORE: 0
AGITATION: NORMAL ACTIVITY
HEADACHE, FULLNESS IN HEAD: NOT PRESENT
NAUSEA AND VOMITING: NO NAUSEA AND NO VOMITING
NAUSEA AND VOMITING: NO NAUSEA AND NO VOMITING
PAROXYSMAL SWEATS: NO SWEAT VISIBLE
AGITATION: NORMAL ACTIVITY
ANXIETY: NO ANXIETY, AT EASE
PAROXYSMAL SWEATS: NO SWEAT VISIBLE

## 2024-07-30 ASSESSMENT — ENCOUNTER SYMPTOMS
NERVOUS/ANXIOUS: 1
SLEEP DISTURBANCE: 1
CONFUSION: 0
ACTIVITY CHANGE: 1
AGITATION: 0
DYSPHORIC MOOD: 1
NECK STIFFNESS: 1
ARTHRALGIAS: 1
TREMORS: 1
HYPERACTIVE: 1
DECREASED CONCENTRATION: 0
HALLUCINATIONS: 0
WEAKNESS: 1
FATIGUE: 1

## 2024-07-30 ASSESSMENT — COGNITIVE AND FUNCTIONAL STATUS - GENERAL
DAILY ACTIVITIY SCORE: 24
MOBILITY SCORE: 24

## 2024-07-30 ASSESSMENT — PAIN SCALES - GENERAL: PAINLEVEL_OUTOF10: 0 - NO PAIN

## 2024-07-30 NOTE — PROGRESS NOTES
TCC gave patient AA meeting times and locations in [place] for the rest of the week. Gave patient detox resources, low income housing resources, homeless shelter resources, 211, lifeline, mental health resources with numbers and locations he can call. Pt states he has an apt with his counselor at [facility] on 08/05/24. Pt states he will stay at a friends house or go to a hotel. Pt will  his SS check and he needs to go to court on Thursday to pay a fine.     DISCHARGE PLAN TO RETURN TO FRIENDS HOUSE/HOTEL. RESOURCES GIVEN.       07/30/24 1314   Current Planned Discharge Disposition   Current Planned Discharge Disposition Home

## 2024-07-30 NOTE — PROGRESS NOTES
Malik Evans is a 64 y.o. male on day 0 of admission presenting with Urinary tract infection.      Subjective   Patient seen and evaluated this morning on rounds.  He is lying supine in bed.  States he slept well.  Feels good.  No complaints.  He is hopeful for discharge soon.  AVSS.  No acute events overnight.  Remains free from S/S alcohol withdrawal.          Objective     Last Recorded Vitals  /87 (BP Location: Left arm, Patient Position: Lying)   Pulse 83   Temp 36.2 °C (97.2 °F) (Oral)   Resp 17   Wt 79.4 kg (175 lb)   SpO2 99%   Intake/Output last 3 Shifts:    Intake/Output Summary (Last 24 hours) at 7/30/2024 1206  Last data filed at 7/30/2024 0907  Gross per 24 hour   Intake 515 ml   Output 0 ml   Net 515 ml       Admission Weight  Weight: 79.4 kg (175 lb) (07/28/24 1314)    Daily Weight  07/28/24 : 79.4 kg (175 lb)    Image Results  ECG 12 lead  Sinus tachycardia  Otherwise normal ECG  When compared with ECG of 28-JUL-2024 13:15, (unconfirmed)  QRS axis Shifted left  Confirmed by Pedrito Lam ([id number]) on 7/29/2024 12:28:07 PM      Physical Exam  Vitals and nursing note reviewed.   Constitutional:       General: He is not in acute distress.     Appearance: He is normal weight. He is not toxic-appearing.   HENT:      Head: Normocephalic and atraumatic.      Comments: No facial swelling appreciated     Mouth/Throat:      Mouth: Mucous membranes are moist.   Eyes:      Extraocular Movements: Extraocular movements intact.      Pupils: Pupils are equal, round, and reactive to light.   Cardiovascular:      Rate and Rhythm: Normal rate and regular rhythm.      Heart sounds: Normal heart sounds.   Pulmonary:      Effort: Pulmonary effort is normal.      Breath sounds: Normal breath sounds.   Musculoskeletal:         General: Normal range of motion.      Cervical back: Normal range of motion and neck supple.      Right lower leg: No edema.      Left lower leg: No edema.   Skin:     General: Skin is  warm and dry.      Capillary Refill: Capillary refill takes less than 2 seconds.   Neurological:      General: No focal deficit present.      Mental Status: He is alert and oriented to person, place, and time.   Psychiatric:         Behavior: Behavior normal.         Relevant Results               Assessment/Plan      Principal Problem:    Urinary tract infection  Active Problems:    ETOH abuse    Homelessness        UTI  -- Urine culture without concerning growth, shows normal urogenital steven  -- 3 more days of p.o. Keflex to complete 5-day course      EtOH abuse  -- No overt signs symptoms of withdrawal  -- He was provided with outpatient referrals     Homelessness  -- Patient indicates he can stay at a friend's home        Discharge this afternoon            Donna Mahoney, COOKIE-CNP

## 2024-07-30 NOTE — PROGRESS NOTES
Spiritual Care Visit    Clinical Encounter Type  Visited With: Patient  Routine Visit: Introduction  Continue Visiting: No         Values/Beliefs  Spiritual Requests During Hospitalization: Visit only. No sacraments given to patient    Sacramental Encounters  Communion: Does not want communion  Communion Given Indicator: No  Sacrament of Sick-Anointing: Patient declined anointing     Capo Daugherty

## 2024-07-30 NOTE — CARE PLAN
The patient's goals for the shift include rest    The clinical goals for the shift include safety    Over the shift, the patient did not make progress toward the following goals. Barriers to progression include . Recommendations to address these barriers include .

## 2024-07-30 NOTE — DISCHARGE INSTRUCTIONS
--  your antibiotics from the [facility], [place] pharmacy.  You will need to take 1 pill, 3 times daily for the next 3 days    -- Stay well-hydrated    -- Call today to make a follow-up appointment with your primary care provider    -- Consider over-the-counter Benadryl as needed for facial itching or swelling    -- If you develop worsening swelling, fever, chills or other concerns please return to the emergency room

## 2024-07-30 NOTE — CARE PLAN
Problem: Fall/Injury  Goal: Not fall by end of shift  Outcome: Progressing  Goal: Be free from injury by end of the shift  Outcome: Progressing  Goal: Verbalize understanding of personal risk factors for fall in the hospital  Outcome: Progressing  Goal: Verbalize understanding of risk factor reduction measures to prevent injury from fall in the home  Outcome: Progressing  Goal: Use assistive devices by end of the shift  Outcome: Progressing  Goal: Pace activities to prevent fatigue by end of the shift  Outcome: Progressing     Problem: Pain - Adult  Goal: Verbalizes/displays adequate comfort level or baseline comfort level  Outcome: Progressing     Problem: Safety - Adult  Goal: Free from fall injury  Outcome: Progressing     Problem: Discharge Planning  Goal: Discharge to home or other facility with appropriate resources  Outcome: Progressing     Problem: Chronic Conditions and Co-morbidities  Goal: Patient's chronic conditions and co-morbidity symptoms are monitored and maintained or improved  Outcome: Progressing   The patient's goals for the shift include rest    The clinical goals for the shift include safety

## 2024-08-01 ENCOUNTER — APPOINTMENT (OUTPATIENT)
Dept: CARDIOLOGY | Facility: HOSPITAL | Age: 64
End: 2024-08-01
Payer: COMMERCIAL

## 2024-08-01 ENCOUNTER — HOSPITAL ENCOUNTER (EMERGENCY)
Facility: HOSPITAL | Age: 64
Discharge: HOME | End: 2024-08-01
Attending: STUDENT IN AN ORGANIZED HEALTH CARE EDUCATION/TRAINING PROGRAM
Payer: COMMERCIAL

## 2024-08-01 ENCOUNTER — APPOINTMENT (OUTPATIENT)
Dept: RADIOLOGY | Facility: HOSPITAL | Age: 64
End: 2024-08-01
Payer: COMMERCIAL

## 2024-08-01 VITALS
HEART RATE: 88 BPM | HEIGHT: 72 IN | OXYGEN SATURATION: 94 % | TEMPERATURE: 98.6 F | WEIGHT: 180 LBS | DIASTOLIC BLOOD PRESSURE: 88 MMHG | RESPIRATION RATE: 16 BRPM | BODY MASS INDEX: 24.38 KG/M2 | SYSTOLIC BLOOD PRESSURE: 131 MMHG
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
## 2024-08-01 DIAGNOSIS — S01.511A LIP LACERATION, INITIAL ENCOUNTER: ICD-10-CM

## 2024-08-01 DIAGNOSIS — F10.920 ALCOHOLIC INTOXICATION WITHOUT COMPLICATION (CMS-HCC): ICD-10-CM

## 2024-08-01 PROCEDURE — 72125 CT NECK SPINE W/O DYE: CPT | Performed by: RADIOLOGY

## 2024-08-01 PROCEDURE — 70486 CT MAXILLOFACIAL W/O DYE: CPT | Performed by: RADIOLOGY

## 2024-08-01 PROCEDURE — 76377 3D RENDER W/INTRP POSTPROCES: CPT

## 2024-08-01 PROCEDURE — 2500000001 HC RX 250 WO HCPCS SELF ADMINISTERED DRUGS (ALT 637 FOR MEDICARE OP): Performed by: STUDENT IN AN ORGANIZED HEALTH CARE EDUCATION/TRAINING PROGRAM

## 2024-08-01 PROCEDURE — 70486 CT MAXILLOFACIAL W/O DYE: CPT

## 2024-08-01 PROCEDURE — 70450 CT HEAD/BRAIN W/O DYE: CPT

## 2024-08-01 PROCEDURE — 70450 CT HEAD/BRAIN W/O DYE: CPT | Performed by: RADIOLOGY

## 2024-08-01 PROCEDURE — 72125 CT NECK SPINE W/O DYE: CPT

## 2024-08-01 PROCEDURE — 2500000001 HC RX 250 WO HCPCS SELF ADMINISTERED DRUGS (ALT 637 FOR MEDICARE OP)

## 2024-08-01 PROCEDURE — 76377 3D RENDER W/INTRP POSTPROCES: CPT | Performed by: RADIOLOGY

## 2024-08-01 PROCEDURE — 93005 ELECTROCARDIOGRAM TRACING: CPT

## 2024-08-01 PROCEDURE — 99285 EMERGENCY DEPT VISIT HI MDM: CPT | Mod: 25

## 2024-08-01 RX ORDER — LIDOCAINE HYDROCHLORIDE 20 MG/ML
1.25 SOLUTION OROPHARYNGEAL ONCE
Status: COMPLETED | OUTPATIENT
Start: 2024-08-01 | End: 2024-08-01

## 2024-08-01 RX ORDER — DIPHENHYDRAMINE HCL 25 MG
25 TABLET ORAL ONCE
Status: COMPLETED | OUTPATIENT
Start: 2024-08-01 | End: 2024-08-01

## 2024-08-01 RX ORDER — ACETAMINOPHEN 325 MG/1
975 TABLET ORAL ONCE
Status: COMPLETED | OUTPATIENT
Start: 2024-08-01 | End: 2024-08-01

## 2024-08-01 RX ADMIN — Medication 1 APPLICATION: at 21:39

## 2024-08-01 RX ADMIN — LIDOCAINE HYDROCHLORIDE 1.25 ML: 20 SOLUTION ORAL at 21:39

## 2024-08-01 RX ADMIN — ACETAMINOPHEN 975 MG: 325 TABLET ORAL at 20:02

## 2024-08-01 RX ADMIN — DIPHENHYDRAMINE HYDROCHLORIDE 25 MG: 25 TABLET ORAL at 21:38

## 2024-08-01 ASSESSMENT — PAIN DESCRIPTION - LOCATION: LOCATION: FACE

## 2024-08-01 ASSESSMENT — LIFESTYLE VARIABLES
TOTAL SCORE: 4
EVER FELT BAD OR GUILTY ABOUT YOUR DRINKING: YES
EVER HAD A DRINK FIRST THING IN THE MORNING TO STEADY YOUR NERVES TO GET RID OF A HANGOVER: YES
HAVE PEOPLE ANNOYED YOU BY CRITICIZING YOUR DRINKING: YES
HAVE YOU EVER FELT YOU SHOULD CUT DOWN ON YOUR DRINKING: YES

## 2024-08-01 ASSESSMENT — PAIN - FUNCTIONAL ASSESSMENT: PAIN_FUNCTIONAL_ASSESSMENT: 0-10

## 2024-08-01 ASSESSMENT — PAIN SCALES - GENERAL: PAINLEVEL_OUTOF10: 5 - MODERATE PAIN
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
## 2024-08-02 ENCOUNTER — HOSPITAL ENCOUNTER (EMERGENCY)
Facility: HOSPITAL | Age: 64
Discharge: HOME | End: 2024-08-03
Attending: STUDENT IN AN ORGANIZED HEALTH CARE EDUCATION/TRAINING PROGRAM
Payer: COMMERCIAL

## 2024-08-02 DIAGNOSIS — F32.9 MAJOR DEPRESSIVE DISORDER, REMISSION STATUS UNSPECIFIED, UNSPECIFIED WHETHER RECURRENT: ICD-10-CM

## 2024-08-02 DIAGNOSIS — Z71.1 PHYSICALLY WELL BUT WORRIED: Primary | ICD-10-CM

## 2024-08-02 DIAGNOSIS — F10.10 ETOH ABUSE: ICD-10-CM

## 2024-08-02 DIAGNOSIS — Z59.00 HOMELESS: ICD-10-CM

## 2024-08-02 DIAGNOSIS — R03.0 ELEVATED BLOOD PRESSURE READING: ICD-10-CM

## 2024-08-02 LAB
ATRIAL RATE: 92 BPM
BACTERIA BLD CULT: NORMAL
BACTERIA BLD CULT: NORMAL
P AXIS: 49 DEGREES
P OFFSET: 186 MS
P ONSET: 133 MS
PR INTERVAL: 164 MS
Q ONSET: 215 MS
QRS COUNT: 15 BEATS
QRS DURATION: 90 MS
QT INTERVAL: 346 MS
QTC CALCULATION(BAZETT): 427 MS
QTC FREDERICIA: 399 MS
R AXIS: 38 DEGREES
T AXIS: 39 DEGREES
T OFFSET: 388 MS
VENTRICULAR RATE: 92 BPM

## 2024-08-02 PROCEDURE — 99283 EMERGENCY DEPT VISIT LOW MDM: CPT

## 2024-08-02 SDOH — ECONOMIC STABILITY - HOUSING INSECURITY: HOMELESSNESS UNSPECIFIED: Z59.00

## 2024-08-02 ASSESSMENT — COLUMBIA-SUICIDE SEVERITY RATING SCALE - C-SSRS
1. IN THE PAST MONTH, HAVE YOU WISHED YOU WERE DEAD OR WISHED YOU COULD GO TO SLEEP AND NOT WAKE UP?: NO
2. HAVE YOU ACTUALLY HAD ANY THOUGHTS OF KILLING YOURSELF?: NO
6. HAVE YOU EVER DONE ANYTHING, STARTED TO DO ANYTHING, OR PREPARED TO DO ANYTHING TO END YOUR LIFE?: NO

## 2024-08-02 ASSESSMENT — PAIN - FUNCTIONAL ASSESSMENT: PAIN_FUNCTIONAL_ASSESSMENT: 0-10

## 2024-08-02 ASSESSMENT — PAIN SCALES - GENERAL: PAINLEVEL_OUTOF10: 0 - NO PAIN

## 2024-08-02 ASSESSMENT — LIFESTYLE VARIABLES
HAVE YOU EVER FELT YOU SHOULD CUT DOWN ON YOUR DRINKING: NO
TOTAL SCORE: 0
EVER FELT BAD OR GUILTY ABOUT YOUR DRINKING: NO
EVER HAD A DRINK FIRST THING IN THE MORNING TO STEADY YOUR NERVES TO GET RID OF A HANGOVER: NO
HAVE PEOPLE ANNOYED YOU BY CRITICIZING YOUR DRINKING: NO

## 2024-08-02 NOTE — DISCHARGE INSTRUCTIONS
Your sutures are absorbable and do not need to be taken out.  Please follow-up with a primary care provider for reevaluation of your symptoms.  Please refer to the attached instructions regarding alcohol use disorder and mouth and dental injuries in adults.  Present back with any worsening of symptoms.  You may use over-the-counter pain relievers for pain relief.    It is important to remember that your care does not end here and you must continue to monitor your condition closely. Please return to the emergency department for any worsening or concerning signs or symptoms as directed by our conversations and the discharge instructions. If you do not have a doctor please contact the referral number on your discharge instructions. Please contact any physician specialists provided in your discharge notes as it is very important to follow up with them regarding your condition. If you are unable to reach the physicians provided, please come back to the Emergency Department at any time.

## 2024-08-03 VITALS
WEIGHT: 180 LBS | HEART RATE: 88 BPM | HEIGHT: 72 IN | TEMPERATURE: 98.4 F | DIASTOLIC BLOOD PRESSURE: 97 MMHG | OXYGEN SATURATION: 97 % | SYSTOLIC BLOOD PRESSURE: 155 MMHG | RESPIRATION RATE: 16 BRPM | BODY MASS INDEX: 24.38 KG/M2

## 2024-08-03 LAB
APPEARANCE UR: CLEAR
BILIRUB UR STRIP.AUTO-MCNC: NEGATIVE MG/DL
COLOR UR: ABNORMAL
GLUCOSE UR STRIP.AUTO-MCNC: NORMAL MG/DL
HOLD SPECIMEN: NORMAL
HYALINE CASTS #/AREA URNS AUTO: ABNORMAL /LPF
KETONES UR STRIP.AUTO-MCNC: NEGATIVE MG/DL
LEUKOCYTE ESTERASE UR QL STRIP.AUTO: NEGATIVE
MUCOUS THREADS #/AREA URNS AUTO: ABNORMAL /LPF
NITRITE UR QL STRIP.AUTO: NEGATIVE
PH UR STRIP.AUTO: 5 [PH]
PROT UR STRIP.AUTO-MCNC: NEGATIVE MG/DL
RBC # UR STRIP.AUTO: ABNORMAL /UL
RBC #/AREA URNS AUTO: ABNORMAL /HPF
SP GR UR STRIP.AUTO: 1.03
UROBILINOGEN UR STRIP.AUTO-MCNC: NORMAL MG/DL
WBC #/AREA URNS AUTO: ABNORMAL /HPF

## 2024-08-03 PROCEDURE — 81003 URINALYSIS AUTO W/O SCOPE: CPT | Performed by: STUDENT IN AN ORGANIZED HEALTH CARE EDUCATION/TRAINING PROGRAM

## 2024-08-03 PROCEDURE — 2500000002 HC RX 250 W HCPCS SELF ADMINISTERED DRUGS (ALT 637 FOR MEDICARE OP, ALT 636 FOR OP/ED)

## 2024-08-03 PROCEDURE — 2500000001 HC RX 250 WO HCPCS SELF ADMINISTERED DRUGS (ALT 637 FOR MEDICARE OP): Performed by: STUDENT IN AN ORGANIZED HEALTH CARE EDUCATION/TRAINING PROGRAM

## 2024-08-03 PROCEDURE — 2500000001 HC RX 250 WO HCPCS SELF ADMINISTERED DRUGS (ALT 637 FOR MEDICARE OP)

## 2024-08-03 RX ORDER — DESVENLAFAXINE 100 MG/1
100 TABLET, EXTENDED RELEASE ORAL DAILY
Qty: 30 TABLET | Refills: 0 | Status: SHIPPED | OUTPATIENT
Start: 2024-08-03 | End: 2024-09-02

## 2024-08-03 RX ORDER — CLONIDINE HYDROCHLORIDE 0.1 MG/1
0.1 TABLET ORAL ONCE
Status: COMPLETED | OUTPATIENT
Start: 2024-08-03 | End: 2024-08-03

## 2024-08-03 RX ORDER — CLONIDINE HYDROCHLORIDE 0.2 MG/1
0.1 TABLET ORAL 2 TIMES DAILY
Qty: 30 TABLET | Refills: 0 | Status: SHIPPED | OUTPATIENT
Start: 2024-08-03 | End: 2024-09-02

## 2024-08-03 RX ORDER — DESVENLAFAXINE 100 MG/1
100 TABLET, EXTENDED RELEASE ORAL DAILY
Status: COMPLETED | OUTPATIENT
Start: 2024-08-03 | End: 2024-08-03

## 2024-08-03 RX ORDER — LAMOTRIGINE 100 MG/1
100 TABLET ORAL ONCE
Status: COMPLETED | OUTPATIENT
Start: 2024-08-03 | End: 2024-08-03

## 2024-08-03 RX ORDER — LAMOTRIGINE 100 MG/1
100 TABLET, ORALLY DISINTEGRATING ORAL 2 TIMES DAILY
Qty: 60 TABLET | Refills: 0 | Status: SHIPPED | OUTPATIENT
Start: 2024-08-03 | End: 2024-09-02

## 2024-08-03 RX ORDER — HYDROXYZINE HYDROCHLORIDE 25 MG/1
25 TABLET, FILM COATED ORAL ONCE
Status: COMPLETED | OUTPATIENT
Start: 2024-08-03 | End: 2024-08-03

## 2024-08-03 RX ORDER — ARIPIPRAZOLE 5 MG/1
5 TABLET ORAL DAILY
Status: COMPLETED | OUTPATIENT
Start: 2024-08-03 | End: 2024-08-03

## 2024-08-03 RX ORDER — ARIPIPRAZOLE 5 MG/1
5 TABLET ORAL DAILY
Status: DISCONTINUED | OUTPATIENT
Start: 2024-08-03 | End: 2024-08-03

## 2024-08-03 RX ORDER — ARIPIPRAZOLE 5 MG/1
5 TABLET ORAL DAILY
Qty: 30 TABLET | Refills: 0 | Status: SHIPPED | OUTPATIENT
Start: 2024-08-03 | End: 2024-09-02

## 2024-08-03 RX ORDER — DESVENLAFAXINE 100 MG/1
100 TABLET, EXTENDED RELEASE ORAL DAILY
Status: DISCONTINUED | OUTPATIENT
Start: 2024-08-03 | End: 2024-08-03

## 2024-08-03 RX ADMIN — HYDROXYZINE HYDROCHLORIDE 25 MG: 25 TABLET ORAL at 00:25

## 2024-08-03 RX ADMIN — DESVENLAFAXINE SUCCINATE 100 MG: 100 TABLET, EXTENDED RELEASE ORAL at 01:41

## 2024-08-03 RX ADMIN — CLONIDINE HYDROCHLORIDE 0.1 MG: 0.1 TABLET ORAL at 01:39

## 2024-08-03 RX ADMIN — LAMOTRIGINE 100 MG: 100 TABLET ORAL at 01:40

## 2024-08-03 RX ADMIN — ARIPIPRAZOLE 5 MG: 5 TABLET ORAL at 01:40

## 2024-08-03 NOTE — DISCHARGE INSTRUCTIONS
Thank you for allowing myself and the team to take care of you during your emergency situation and addressing your health concerns.    You were evaluated for symptoms of generalized weakness and concern for urinary tract infection.     Your evaluated for your symptoms, every effort was made to identify any life-threatening conditions to include but not limited to kidney infection (pyelonephritis), significant dehydration, significant electrolyte or metabolic derangement/abnormality, traumatic injuries.  Some conditions are not life-threatening can be managed as an outpatient.      During your evaluation and assessment, all measures were taken to evaluate you and address your health concerns to identify dangerous and life threatening health conditions. It is not possible to identify all health conditions or pathologies and eliminate any chance of unfavorable outcomes while in the Emergency Department. My team encourages you to be vigilant with your health and follow-up with the appropriate providers outlined in your discharge paperwork. Please return to the Emergency Department if you feel that your health has not improved or experiencing worsening symptoms.    Special instructions please make follow-up appoint with your primary care physician to further manage your symptoms.  Please take the medications as they are prescribed    Incidental findings:  None
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
## 2024-08-03 NOTE — ED NOTES
Assumed care of pt. No distress noted. Pt has multi complaints and concerns.  Pt concerned in not having px meds for over one month.  Pt c/o full body itching.  Mild spots of redness noted to lower extremities.  Pt appears anxious and restless in speech.  Pt denies SI/HI to this nurse.  Pt is currently homeless on unable to provide self with abilities to make appointments or obain meds.  Pt currently has not social support.  Pending md staples and potential social work assistance.      Isabelle Lazar RN  08/03/24 0029

## 2024-08-03 NOTE — ED NOTES
Pt refused Lamictal after was scanned.  Pt did not take.       Isabelle Lazar, RN  08/03/24 0147

## 2024-08-03 NOTE — ED TRIAGE NOTES
Pt presents to the ED for a medication refill. Pt states he has been out of his antidepressants for the past month. Pt has multiple complaints in triage, pt states his legs are itching, he a UTI, and feels weak. Pt denies any cp, sob, dizziness, fever and chills.

## 2024-08-04 ENCOUNTER — APPOINTMENT (OUTPATIENT)
Dept: CARDIOLOGY | Facility: HOSPITAL | Age: 64
End: 2024-08-04
Payer: COMMERCIAL

## 2024-08-04 ENCOUNTER — HOSPITAL ENCOUNTER (EMERGENCY)
Facility: HOSPITAL | Age: 64
Discharge: OTHER INSTITUTION | End: 2024-08-05
Attending: EMERGENCY MEDICINE
Payer: COMMERCIAL

## 2024-08-04 DIAGNOSIS — F10.920 ALCOHOLIC INTOXICATION WITHOUT COMPLICATION (CMS-HCC): Primary | ICD-10-CM

## 2024-08-04 LAB
ALBUMIN SERPL-MCNC: 4.1 G/DL (ref 3.5–5)
ALP BLD-CCNC: 91 U/L (ref 35–125)
ALT SERPL-CCNC: 28 U/L (ref 5–40)
AMPHETAMINES UR QL SCN>1000 NG/ML: NEGATIVE
ANION GAP SERPL CALC-SCNC: 16 MMOL/L
APAP SERPL-MCNC: <5 UG/ML
APPEARANCE UR: CLEAR
AST SERPL-CCNC: 31 U/L (ref 5–40)
BARBITURATES UR QL SCN>300 NG/ML: NEGATIVE
BASOPHILS # BLD AUTO: 0.07 X10*3/UL (ref 0–0.1)
BASOPHILS NFR BLD AUTO: 0.6 %
BENZODIAZ UR QL SCN>300 NG/ML: NEGATIVE
BILIRUB SERPL-MCNC: 0.5 MG/DL (ref 0.1–1.2)
BILIRUB UR STRIP.AUTO-MCNC: NEGATIVE MG/DL
BUN SERPL-MCNC: 9 MG/DL (ref 8–25)
BZE UR QL SCN>300 NG/ML: NEGATIVE
CALCIUM SERPL-MCNC: 8.5 MG/DL (ref 8.5–10.4)
CANNABINOIDS UR QL SCN>50 NG/ML: NEGATIVE
CHLORIDE SERPL-SCNC: 99 MMOL/L (ref 97–107)
CO2 SERPL-SCNC: 22 MMOL/L (ref 24–31)
COLOR UR: ABNORMAL
CREAT SERPL-MCNC: 0.8 MG/DL (ref 0.4–1.6)
EGFRCR SERPLBLD CKD-EPI 2021: >90 ML/MIN/1.73M*2
EOSINOPHIL # BLD AUTO: 0.34 X10*3/UL (ref 0–0.7)
EOSINOPHIL NFR BLD AUTO: 2.9 %
ERYTHROCYTE [DISTWIDTH] IN BLOOD BY AUTOMATED COUNT: 11.9 % (ref 11.5–14.5)
ETHANOL SERPL-MCNC: 0.3 G/DL
FENTANYL+NORFENTANYL UR QL SCN: NEGATIVE
GLUCOSE SERPL-MCNC: 103 MG/DL (ref 65–99)
GLUCOSE UR STRIP.AUTO-MCNC: NORMAL MG/DL
HCT VFR BLD AUTO: 36.8 % (ref 41–52)
HGB BLD-MCNC: 13.2 G/DL (ref 13.5–17.5)
IMM GRANULOCYTES # BLD AUTO: 0.04 X10*3/UL (ref 0–0.7)
IMM GRANULOCYTES NFR BLD AUTO: 0.3 % (ref 0–0.9)
KETONES UR STRIP.AUTO-MCNC: NEGATIVE MG/DL
LEUKOCYTE ESTERASE UR QL STRIP.AUTO: NEGATIVE
LYMPHOCYTES # BLD AUTO: 2.57 X10*3/UL (ref 1.2–4.8)
LYMPHOCYTES NFR BLD AUTO: 21.9 %
MCH RBC QN AUTO: 32.3 PG (ref 26–34)
MCHC RBC AUTO-ENTMCNC: 35.9 G/DL (ref 32–36)
MCV RBC AUTO: 90 FL (ref 80–100)
METHADONE UR QL SCN>300 NG/ML: NEGATIVE
MONOCYTES # BLD AUTO: 0.58 X10*3/UL (ref 0.1–1)
MONOCYTES NFR BLD AUTO: 4.9 %
MUCOUS THREADS #/AREA URNS AUTO: NORMAL /LPF
NEUTROPHILS # BLD AUTO: 8.14 X10*3/UL (ref 1.2–7.7)
NEUTROPHILS NFR BLD AUTO: 69.4 %
NITRITE UR QL STRIP.AUTO: NEGATIVE
NRBC BLD-RTO: 0 /100 WBCS (ref 0–0)
OPIATES UR QL SCN>300 NG/ML: NEGATIVE
OXYCODONE UR QL: NEGATIVE
PCP UR QL SCN>25 NG/ML: NEGATIVE
PH UR STRIP.AUTO: 5.5 [PH]
PLATELET # BLD AUTO: 219 X10*3/UL (ref 150–450)
POTASSIUM SERPL-SCNC: 3.6 MMOL/L (ref 3.4–5.1)
PROT SERPL-MCNC: 6.8 G/DL (ref 5.9–7.9)
PROT UR STRIP.AUTO-MCNC: NEGATIVE MG/DL
RBC # BLD AUTO: 4.09 X10*6/UL (ref 4.5–5.9)
RBC # UR STRIP.AUTO: ABNORMAL /UL
RBC #/AREA URNS AUTO: NORMAL /HPF
SALICYLATES SERPL-MCNC: <0 MG/DL
SODIUM SERPL-SCNC: 137 MMOL/L (ref 133–145)
SP GR UR STRIP.AUTO: 1.01
UROBILINOGEN UR STRIP.AUTO-MCNC: NORMAL MG/DL
WBC # BLD AUTO: 11.7 X10*3/UL (ref 4.4–11.3)
WBC #/AREA URNS AUTO: NORMAL /HPF

## 2024-08-04 PROCEDURE — 80053 COMPREHEN METABOLIC PANEL: CPT | Performed by: PHYSICIAN ASSISTANT

## 2024-08-04 PROCEDURE — 81001 URINALYSIS AUTO W/SCOPE: CPT | Mod: 59 | Performed by: PHYSICIAN ASSISTANT

## 2024-08-04 PROCEDURE — 36415 COLL VENOUS BLD VENIPUNCTURE: CPT | Performed by: PHYSICIAN ASSISTANT

## 2024-08-04 PROCEDURE — 2500000004 HC RX 250 GENERAL PHARMACY W/ HCPCS (ALT 636 FOR OP/ED): Performed by: PHYSICIAN ASSISTANT

## 2024-08-04 PROCEDURE — 80143 DRUG ASSAY ACETAMINOPHEN: CPT | Performed by: PHYSICIAN ASSISTANT

## 2024-08-04 PROCEDURE — 90839 PSYTX CRISIS INITIAL 60 MIN: CPT

## 2024-08-04 PROCEDURE — 80307 DRUG TEST PRSMV CHEM ANLYZR: CPT | Performed by: PHYSICIAN ASSISTANT

## 2024-08-04 PROCEDURE — 99285 EMERGENCY DEPT VISIT HI MDM: CPT

## 2024-08-04 PROCEDURE — 2500000001 HC RX 250 WO HCPCS SELF ADMINISTERED DRUGS (ALT 637 FOR MEDICARE OP): Performed by: PHYSICIAN ASSISTANT

## 2024-08-04 PROCEDURE — 85025 COMPLETE CBC W/AUTO DIFF WBC: CPT | Performed by: PHYSICIAN ASSISTANT

## 2024-08-04 PROCEDURE — 93005 ELECTROCARDIOGRAM TRACING: CPT

## 2024-08-04 RX ORDER — FOLIC ACID 1 MG/1
1 TABLET ORAL DAILY
Status: DISCONTINUED | OUTPATIENT
Start: 2024-08-04 | End: 2024-08-05 | Stop reason: HOSPADM

## 2024-08-04 RX ORDER — MULTIVIT-MIN/IRON FUM/FOLIC AC 7.5 MG-4
1 TABLET ORAL DAILY
Status: DISCONTINUED | OUTPATIENT
Start: 2024-08-04 | End: 2024-08-05 | Stop reason: HOSPADM

## 2024-08-04 RX ORDER — LANOLIN ALCOHOL/MO/W.PET/CERES
100 CREAM (GRAM) TOPICAL DAILY
Status: DISCONTINUED | OUTPATIENT
Start: 2024-08-04 | End: 2024-08-05 | Stop reason: HOSPADM

## 2024-08-04 SDOH — HEALTH STABILITY: MENTAL HEALTH: IN THE PAST FEW WEEKS, HAVE YOU WISHED YOU WERE DEAD?: NO

## 2024-08-04 SDOH — HEALTH STABILITY: MENTAL HEALTH: IN THE PAST FEW WEEKS, HAVE YOU FELT THAT YOU OR YOUR FAMILY WOULD BE BETTER OFF IF YOU WERE DEAD?: NO

## 2024-08-04 SDOH — HEALTH STABILITY: MENTAL HEALTH: ANXIETY SYMPTOMS: NO PROBLEMS REPORTED OR OBSERVED.

## 2024-08-04 SDOH — HEALTH STABILITY: MENTAL HEALTH: WISH TO BE DEAD (PAST 1 MONTH): NO

## 2024-08-04 SDOH — ECONOMIC STABILITY: GENERAL: FINANCIAL CONCERNS: UNABLE TO PAY BILLS

## 2024-08-04 SDOH — ECONOMIC STABILITY: HOUSING INSECURITY

## 2024-08-04 SDOH — HEALTH STABILITY: MENTAL HEALTH: SUICIDAL BEHAVIOR (LIFETIME): NO

## 2024-08-04 SDOH — HEALTH STABILITY: MENTAL HEALTH: IN THE PAST WEEK, HAVE YOU BEEN HAVING THOUGHTS ABOUT KILLING YOURSELF?: NO

## 2024-08-04 SDOH — HEALTH STABILITY: MENTAL HEALTH: DEPRESSION SYMPTOMS: LOSS OF INTEREST;FEELINGS OF WORTHLESSNESS

## 2024-08-04 SDOH — HEALTH STABILITY: MENTAL HEALTH: ARE YOU HAVING THOUGHTS OF KILLING YOURSELF RIGHT NOW?: NO

## 2024-08-04 SDOH — HEALTH STABILITY: MENTAL HEALTH: NON-SPECIFIC ACTIVE SUICIDAL THOUGHTS (PAST 1 MONTH): NO

## 2024-08-04 SDOH — HEALTH STABILITY: MENTAL HEALTH: HAVE YOU EVER TRIED TO KILL YOURSELF?: NO

## 2024-08-04 ASSESSMENT — PAIN - FUNCTIONAL ASSESSMENT: PAIN_FUNCTIONAL_ASSESSMENT: 0-10

## 2024-08-04 ASSESSMENT — PAIN SCALES - GENERAL: PAINLEVEL_OUTOF10: 0 - NO PAIN

## 2024-08-04 ASSESSMENT — COLUMBIA-SUICIDE SEVERITY RATING SCALE - C-SSRS
6. HAVE YOU EVER DONE ANYTHING, STARTED TO DO ANYTHING, OR PREPARED TO DO ANYTHING TO END YOUR LIFE?: NO
1. IN THE PAST MONTH, HAVE YOU WISHED YOU WERE DEAD OR WISHED YOU COULD GO TO SLEEP AND NOT WAKE UP?: NO
2. HAVE YOU ACTUALLY HAD ANY THOUGHTS OF KILLING YOURSELF?: NO

## 2024-08-04 ASSESSMENT — LIFESTYLE VARIABLES
SUBSTANCE_ABUSE_PAST_12_MONTHS: YES
PRESCIPTION_ABUSE_PAST_12_MONTHS: NO

## 2024-08-04 NOTE — ED TRIAGE NOTES
Pt here multiple times recently for alcohol intoxication, pt presents today requesting to go to [facility] for detox. Denies SI/HI. Pt obviously intoxicated in triage, states he drank a pint of liquor.

## 2024-08-05 VITALS
TEMPERATURE: 98.2 F | DIASTOLIC BLOOD PRESSURE: 78 MMHG | OXYGEN SATURATION: 97 % | RESPIRATION RATE: 16 BRPM | SYSTOLIC BLOOD PRESSURE: 144 MMHG | BODY MASS INDEX: 24.41 KG/M2 | HEART RATE: 88 BPM | WEIGHT: 180 LBS

## 2024-08-05 PROBLEM — F10.920 ALCOHOLIC INTOXICATION WITHOUT COMPLICATION (CMS-HCC): Status: ACTIVE | Noted: 2024-08-05

## 2024-08-05 LAB — HOLD SPECIMEN: NORMAL

## 2024-08-05 RX ORDER — LORAZEPAM 1 MG/1
1 TABLET ORAL EVERY 2 HOUR PRN
Status: DISCONTINUED | OUTPATIENT
Start: 2024-08-05 | End: 2024-08-05 | Stop reason: HOSPADM

## 2024-08-05 RX ORDER — LORAZEPAM 1 MG/1
2 TABLET ORAL EVERY 2 HOUR PRN
Status: DISCONTINUED | OUTPATIENT
Start: 2024-08-05 | End: 2024-08-05 | Stop reason: HOSPADM

## 2024-08-05 RX ORDER — LORAZEPAM 0.5 MG/1
0.5 TABLET ORAL EVERY 2 HOUR PRN
Status: DISCONTINUED | OUTPATIENT
Start: 2024-08-05 | End: 2024-08-05 | Stop reason: HOSPADM

## 2024-08-05 ASSESSMENT — LIFESTYLE VARIABLES
ANXIETY: NO ANXIETY, AT EASE
TREMOR: NO TREMOR
BLOOD PRESSURE: 154/78
AGITATION: NORMAL ACTIVITY
TOTAL SCORE: 2
ORIENTATION AND CLOUDING OF SENSORIUM: ORIENTED AND CAN DO SERIAL ADDITIONS
VISUAL DISTURBANCES: NOT PRESENT
PULSE: 85
BLOOD PRESSURE: 154/92
HEADACHE, FULLNESS IN HEAD: NOT PRESENT
PULSE: 88
VISUAL DISTURBANCES: NOT PRESENT
AUDITORY DISTURBANCES: NOT PRESENT
PAROXYSMAL SWEATS: NO SWEAT VISIBLE
ORIENTATION AND CLOUDING OF SENSORIUM: ORIENTED AND CAN DO SERIAL ADDITIONS
PAROXYSMAL SWEATS: NO SWEAT VISIBLE
ANXIETY: NO ANXIETY, AT EASE
TACTILE DISTURBANCES: VERY MILD ITCHING, PINS AND NEEDLES, BURNING OR NUMBNESS
TOTAL SCORE: 2
TREMOR: NO TREMOR
NAUSEA AND VOMITING: MILD NAUSEA WITH NO VOMITING
HEADACHE, FULLNESS IN HEAD: NOT PRESENT
AGITATION: NORMAL ACTIVITY
NAUSEA AND VOMITING: MILD NAUSEA WITH NO VOMITING
AUDITORY DISTURBANCES: NOT PRESENT
TACTILE DISTURBANCES: VERY MILD ITCHING, PINS AND NEEDLES, BURNING OR NUMBNESS

## 2024-08-05 NOTE — ED PROVIDER NOTES
HPI   Chief Complaint   Patient presents with    Alcohol Problem       This is a 64-year-old male with past medical history of alcohol addiction presenting to the emergency department requesting alcohol detox.  Patient states his last drink was around 12 PM today.  He states that he drank 1 pint of vodka today.  He usually drinks 2 pints of vodka and a sixpack of beer daily.  Patient states that he would like to go inpatient for alcohol detox.  He denies any complaints currently.  He is acutely intoxicated, therefore a full ROS was unable to be obtained upon initial presentation/history.        Please see HPI for pertinent positive and negative ROS.       Patient History   Past Medical History:   Diagnosis Date    Alcohol use     Depression     Hypertension      No past surgical history on file.  Family History   Problem Relation Name Age of Onset    Hypertension Other      Heart disease Other       Social History     Tobacco Use    Smoking status: Every Day     Types: Cigarettes    Smokeless tobacco: Not on file   Substance Use Topics    Alcohol use: Yes     Comment: 3 pints of vodka a day    Drug use: Yes     Types: Marijuana       Physical Exam   ED Triage Vitals [08/04/24 1819]   Temperature Heart Rate Respirations BP   36.8 °C (98.2 °F) (!) 108 18 149/86      Pulse Ox Temp Source Heart Rate Source Patient Position   97 % Oral -- --      BP Location FiO2 (%)     -- --       Physical Exam  GENERAL APPEARANCE: Awake and alert. No acute respiratory distress.  Patient does appear acutely intoxicated.  He has slurred speech.  Alcohol smell on his breath.  Poor hygiene and indentation.  VITAL SIGNS: As per the nurses' triage record.  HEENT: Normocephalic, atraumatic.   NECK: Soft, nontender and supple   CHEST: Nontender to palpation. Clear to auscultation bilaterally. No rales, rhonchi, or wheezing. Symmetric rise and fall of chest wall.   HEART: Clear S1 and S2. Regular rate and rhythm.  Strong and equal pulses in  the extremities.  ABDOMEN: Soft, nontender, nondistended  MUSCULOSKELETAL: Full gross active range of motion. NEUROLOGICAL: Motor power intact in the upper and lower extremities. Sensation is intact to light touch in the upper and lower extremities.   IMMUNOLOGICAL: No lymphatic streaking noted  DERMATOLOGIC: Warm and dry   PYSCH: Cooperative     ED Course & MDM   Diagnoses as of 08/04/24 2345   Alcoholic intoxication without complication ([facility])                       [place] Coma Scale Score: 15         NIH Stroke Scale: 0                Medical Decision Making  Parts of this chart have been completed using voice recognition software. Please excuse any errors of transcription.  My thought process and reason for plan has been formulated from the time that I saw the patient until the time of disposition and is not specific to one specific moment during their visit and furthermore my MDM encompasses this entire chart and not only this text box.      HPI: Detailed above.    Exam: A medically appropriate exam performed, outlined above, given the known history and presentation.    History obtained from: Patient    EKG: See my supervising physician's EKG interpretation    Medications given during visit:  Medications   folic acid (Folvite) tablet 1 mg (1 mg oral Given 8/4/24 1854)   multivitamin with minerals 1 tablet (1 tablet oral Given 8/4/24 1854)   thiamine (Vitamin B-1) tablet 100 mg (100 mg oral Given 8/4/24 1853)   sodium chloride 0.9 % bolus 1,000 mL (0 mL intravenous Stopped 8/4/24 1912)        Diagnostic/tests  Labs Reviewed   CBC WITH AUTO DIFFERENTIAL - Abnormal       Result Value    WBC 11.7 (*)     nRBC 0.0      RBC 4.09 (*)     Hemoglobin 13.2 (*)     Hematocrit 36.8 (*)     MCV 90      MCH 32.3      MCHC 35.9      RDW 11.9      Platelets 219      Neutrophils % 69.4      Immature Granulocytes %, Automated 0.3      Lymphocytes % 21.9      Monocytes % 4.9      Eosinophils % 2.9      Basophils % 0.6       Neutrophils Absolute 8.14 (*)     Immature Granulocytes Absolute, Automated 0.04      Lymphocytes Absolute 2.57      Monocytes Absolute 0.58      Eosinophils Absolute 0.34      Basophils Absolute 0.07     COMPREHENSIVE METABOLIC PANEL - Abnormal    Glucose 103 (*)     Sodium 137      Potassium 3.6      Chloride 99      Bicarbonate 22 (*)     Urea Nitrogen 9      Creatinine 0.80      eGFR >90      Calcium 8.5      Albumin 4.1      Alkaline Phosphatase 91      Total Protein 6.8      AST 31      Bilirubin, Total 0.5      ALT 28      Anion Gap 16     ACUTE TOXICOLOGY PANEL, BLOOD - Abnormal    Acetaminophen <5.0      Salicylate  <0      Alcohol 0.300 (*)    URINALYSIS WITH REFLEX CULTURE AND MICROSCOPIC - Abnormal    Color, Urine Light-Yellow      Appearance, Urine Clear      Specific Gravity, Urine 1.007      pH, Urine 5.5      Protein, Urine NEGATIVE      Glucose, Urine Normal      Blood, Urine 0.03 (TRACE) (*)     Ketones, Urine NEGATIVE      Bilirubin, Urine NEGATIVE      Urobilinogen, Urine Normal      Nitrite, Urine NEGATIVE      Leukocyte Esterase, Urine NEGATIVE     DRUG SCREEN,URINE - Normal    Amphetamine Screen, Urine Negative      Barbiturate Screen, Urine Negative      Benzodiazepines Screen, Urine Negative      Cannabinoid Screen, Urine Negative      Cocaine Metabolite Screen, Urine Negative      Fentanyl Screen, Urine Negative      Methadone Screen, Urine Negative      Opiate Screen, Urine Negative      Oxycodone Screen, Urine Negative      PCP Screen, Urine Negative      Narrative:     These toxicological screening tests provide unconfirmed qualitative measurements to aid in treatment and diagnosis in cases of drug use or overdose. This test is used only for medical purposes. A positive result does not indicate or measure intoxication. For specific test performance or pathologist consultation, please contact the Laboratory.    The following threshold concentrations are used for these analyses.Values at  or above the threshold concentration are reported as positive. Values below the threshold are reported as negative.    Drug /Screening Threshold                                                                                                 THC/CANNABINOIDS................50 ng/ml  METHADONE......................300 ng/ml  COCAINE METABOLITES............300 ng/ml  BENZODIAZEPINE.................300 ng/ml  PCP.............................25 ng/ml  OPIATE.........................300 ng/ml  AMPHETAMINE/ECSTASY...........1000 ng/ml  BARBITURATE....................200 ng/ml  OXYCODONE......................100 ng/ml  FENTANYL.........................5 ng/ml       URINALYSIS WITH REFLEX CULTURE AND MICROSCOPIC    Narrative:     The following orders were created for panel order Urinalysis with Reflex Culture and Microscopic.  Procedure                               Abnormality         Status                     ---------                               -----------         ------                     Urinalysis with Reflex C...[[id number]]  Abnormal            Final result               Extra Urine Gray Tube[[id number]]                            In process                   Please view results for these tests on the individual orders.   EXTRA URINE GRAY TUBE   URINALYSIS MICROSCOPIC WITH REFLEX CULTURE    WBC, Urine 1-5      RBC, Urine 1-2      Mucus, Urine FEW        No orders to display        Considerations/further MDM:  Patient was seen in conjucntion with my supervising physician,  Dr. Rueda. Please refer to his note.    This is a 64-year-old male presenting to the emergency department for alcohol detox.  Upon presentation, the patient is acutely intoxicated.  He was given IV normal saline 1 L, folic acid, multivitamin and thiamine.    Laboratory evaluation for inpatient detox was ordered.  Patient has mild normocytic anemia.  Mild nonspecific leukocytosis.  Negative urine drug screen.  No electrolyte disturbance or  elevation in liver enzymes.  Alcohol is 0.300 on presentation.    At the end of my shift, the patient does remain in the emergency department pending evaluation by  after clinical sobriety.  Please refer to my supervising physician for further management and disposition of this patient while he remains in the emergency department.      Procedure  Procedures     Vane Lee PA-C  08/04/24 6417
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
## 2024-08-06 LAB
ATRIAL RATE: 103 BPM
P AXIS: 49 DEGREES
P OFFSET: 176 MS
P ONSET: 130 MS
PR INTERVAL: 164 MS
Q ONSET: 212 MS
QRS COUNT: 17 BEATS
QRS DURATION: 94 MS
QT INTERVAL: 346 MS
QTC CALCULATION(BAZETT): 453 MS
QTC FREDERICIA: 414 MS
R AXIS: 3 DEGREES
T AXIS: 32 DEGREES
T OFFSET: 385 MS
VENTRICULAR RATE: 103 BPM

## 2024-08-13 ENCOUNTER — APPOINTMENT (OUTPATIENT)
Dept: CARDIOLOGY | Facility: HOSPITAL | Age: 64
End: 2024-08-13
Payer: COMMERCIAL

## 2024-08-13 ENCOUNTER — HOSPITAL ENCOUNTER (EMERGENCY)
Facility: HOSPITAL | Age: 64
Discharge: HOME | End: 2024-08-14
Payer: COMMERCIAL

## 2024-08-13 DIAGNOSIS — F10.10 ETOH ABUSE: Primary | ICD-10-CM

## 2024-08-13 LAB
ALBUMIN SERPL-MCNC: 3.8 G/DL (ref 3.5–5)
ALP BLD-CCNC: 71 U/L (ref 35–125)
ALT SERPL-CCNC: 24 U/L (ref 5–40)
AMPHETAMINES UR QL SCN>1000 NG/ML: NEGATIVE
ANION GAP SERPL CALC-SCNC: 14 MMOL/L
APAP SERPL-MCNC: <5 UG/ML
APPEARANCE UR: CLEAR
AST SERPL-CCNC: 35 U/L (ref 5–40)
BARBITURATES UR QL SCN>300 NG/ML: POSITIVE
BASOPHILS # BLD AUTO: 0.09 X10*3/UL (ref 0–0.1)
BASOPHILS NFR BLD AUTO: 0.8 %
BENZODIAZ UR QL SCN>300 NG/ML: NEGATIVE
BILIRUB SERPL-MCNC: 0.3 MG/DL (ref 0.1–1.2)
BILIRUB UR STRIP.AUTO-MCNC: NEGATIVE MG/DL
BUN SERPL-MCNC: 23 MG/DL (ref 8–25)
BZE UR QL SCN>300 NG/ML: NEGATIVE
CALCIUM SERPL-MCNC: 8.7 MG/DL (ref 8.5–10.4)
CANNABINOIDS UR QL SCN>50 NG/ML: POSITIVE
CHLORIDE SERPL-SCNC: 96 MMOL/L (ref 97–107)
CO2 SERPL-SCNC: 24 MMOL/L (ref 24–31)
COLOR UR: NORMAL
CREAT SERPL-MCNC: 1.3 MG/DL (ref 0.4–1.6)
EGFRCR SERPLBLD CKD-EPI 2021: 62 ML/MIN/1.73M*2
EOSINOPHIL # BLD AUTO: 0.16 X10*3/UL (ref 0–0.7)
EOSINOPHIL NFR BLD AUTO: 1.5 %
ERYTHROCYTE [DISTWIDTH] IN BLOOD BY AUTOMATED COUNT: 12.5 % (ref 11.5–14.5)
ETHANOL SERPL-MCNC: 0.16 G/DL
FENTANYL+NORFENTANYL UR QL SCN: NEGATIVE
GLUCOSE SERPL-MCNC: 78 MG/DL (ref 65–99)
GLUCOSE UR STRIP.AUTO-MCNC: NORMAL MG/DL
HCT VFR BLD AUTO: 34.4 % (ref 41–52)
HGB BLD-MCNC: 12.1 G/DL (ref 13.5–17.5)
IMM GRANULOCYTES # BLD AUTO: 0.03 X10*3/UL (ref 0–0.7)
IMM GRANULOCYTES NFR BLD AUTO: 0.3 % (ref 0–0.9)
KETONES UR STRIP.AUTO-MCNC: NEGATIVE MG/DL
LEUKOCYTE ESTERASE UR QL STRIP.AUTO: NEGATIVE
LYMPHOCYTES # BLD AUTO: 2.32 X10*3/UL (ref 1.2–4.8)
LYMPHOCYTES NFR BLD AUTO: 21.5 %
MCH RBC QN AUTO: 32.4 PG (ref 26–34)
MCHC RBC AUTO-ENTMCNC: 35.2 G/DL (ref 32–36)
MCV RBC AUTO: 92 FL (ref 80–100)
METHADONE UR QL SCN>300 NG/ML: NEGATIVE
MONOCYTES # BLD AUTO: 0.72 X10*3/UL (ref 0.1–1)
MONOCYTES NFR BLD AUTO: 6.7 %
NEUTROPHILS # BLD AUTO: 7.45 X10*3/UL (ref 1.2–7.7)
NEUTROPHILS NFR BLD AUTO: 69.2 %
NITRITE UR QL STRIP.AUTO: NEGATIVE
NRBC BLD-RTO: 0 /100 WBCS (ref 0–0)
OPIATES UR QL SCN>300 NG/ML: NEGATIVE
OXYCODONE UR QL: NEGATIVE
PCP UR QL SCN>25 NG/ML: NEGATIVE
PH UR STRIP.AUTO: 5.5 [PH]
PLATELET # BLD AUTO: 247 X10*3/UL (ref 150–450)
POTASSIUM SERPL-SCNC: 4.3 MMOL/L (ref 3.4–5.1)
PROT SERPL-MCNC: 6.3 G/DL (ref 5.9–7.9)
PROT UR STRIP.AUTO-MCNC: NEGATIVE MG/DL
RBC # BLD AUTO: 3.73 X10*6/UL (ref 4.5–5.9)
RBC # UR STRIP.AUTO: NEGATIVE /UL
SALICYLATES SERPL-MCNC: 0 MG/DL
SODIUM SERPL-SCNC: 134 MMOL/L (ref 133–145)
SP GR UR STRIP.AUTO: 1.01
UROBILINOGEN UR STRIP.AUTO-MCNC: NORMAL MG/DL
WBC # BLD AUTO: 10.8 X10*3/UL (ref 4.4–11.3)

## 2024-08-13 PROCEDURE — 81003 URINALYSIS AUTO W/O SCOPE: CPT

## 2024-08-13 PROCEDURE — 80053 COMPREHEN METABOLIC PANEL: CPT

## 2024-08-13 PROCEDURE — 80143 DRUG ASSAY ACETAMINOPHEN: CPT

## 2024-08-13 PROCEDURE — 85025 COMPLETE CBC W/AUTO DIFF WBC: CPT

## 2024-08-13 PROCEDURE — 80307 DRUG TEST PRSMV CHEM ANLYZR: CPT

## 2024-08-13 PROCEDURE — 93005 ELECTROCARDIOGRAM TRACING: CPT

## 2024-08-13 PROCEDURE — 36415 COLL VENOUS BLD VENIPUNCTURE: CPT

## 2024-08-13 PROCEDURE — 99284 EMERGENCY DEPT VISIT MOD MDM: CPT

## 2024-08-13 SDOH — HEALTH STABILITY: MENTAL HEALTH: DELUSIONS: CONTROLLED

## 2024-08-13 SDOH — HEALTH STABILITY: MENTAL HEALTH: BEHAVIORS/MOOD: ANXIOUS;APPEARS INTOXICATED;COOPERATIVE;CRYING

## 2024-08-13 SDOH — HEALTH STABILITY: MENTAL HEALTH: HAVE YOU EVER DONE ANYTHING, STARTED TO DO ANYTHING, OR PREPARED TO DO ANYTHING TO END YOUR LIFE?: NO

## 2024-08-13 SDOH — HEALTH STABILITY: MENTAL HEALTH: CONTENT: BLAMING OTHERS;BLAMING SELF

## 2024-08-13 SDOH — HEALTH STABILITY: MENTAL HEALTH: HAVE YOU ACTUALLY HAD ANY THOUGHTS OF KILLING YOURSELF?: NO

## 2024-08-13 SDOH — HEALTH STABILITY: MENTAL HEALTH: HAVE YOU WISHED YOU WERE DEAD OR WISHED YOU COULD GO TO SLEEP AND NOT WAKE UP?: NO

## 2024-08-13 SDOH — HEALTH STABILITY: MENTAL HEALTH

## 2024-08-13 SDOH — HEALTH STABILITY: MENTAL HEALTH: SUICIDE ASSESSMENT: ADULT (C-SSRS)

## 2024-08-13 ASSESSMENT — COLUMBIA-SUICIDE SEVERITY RATING SCALE - C-SSRS
6. HAVE YOU EVER DONE ANYTHING, STARTED TO DO ANYTHING, OR PREPARED TO DO ANYTHING TO END YOUR LIFE?: NO
2. HAVE YOU ACTUALLY HAD ANY THOUGHTS OF KILLING YOURSELF?: NO
1. IN THE PAST MONTH, HAVE YOU WISHED YOU WERE DEAD OR WISHED YOU COULD GO TO SLEEP AND NOT WAKE UP?: NO

## 2024-08-13 NOTE — Clinical Note
Malik Evans was seen and treated in our emergency department on 8/13/2024.  He may return to work on 08/15/2024.       If you have any questions or concerns, please don't hesitate to call.      Evan Hernandes, DO

## 2024-08-14 VITALS
WEIGHT: 180 LBS | OXYGEN SATURATION: 96 % | TEMPERATURE: 97.3 F | RESPIRATION RATE: 13 BRPM | HEIGHT: 72 IN | SYSTOLIC BLOOD PRESSURE: 96 MMHG | BODY MASS INDEX: 24.38 KG/M2 | HEART RATE: 71 BPM | DIASTOLIC BLOOD PRESSURE: 74 MMHG

## 2024-08-14 LAB
ETHANOL SERPL-MCNC: 0.04 G/DL
HOLD SPECIMEN: NORMAL

## 2024-08-14 PROCEDURE — 36415 COLL VENOUS BLD VENIPUNCTURE: CPT

## 2024-08-14 PROCEDURE — 82077 ASSAY SPEC XCP UR&BREATH IA: CPT

## 2024-08-14 NOTE — ED PROVIDER NOTES
HPI   Chief Complaint   Patient presents with    Drug / Alcohol Assessment     Alcohol assessment. Pt states he was just in a program for detox, he is disappointed and feel that he cannot trust himself since he had gone and drank after two days in the program.       Patient is a 64-year-old male presenting with a chief complaint of drug/alcohol assessment.  Patient states he actually just left inpatient detox at [facility].  He states that for the last 2 days, he has began drinking heavily again.  Patient denies any suicidal or homicidal ideations, denies any fevers or chills, patient denies any prior history of withdrawal or seizures from drinking.  Patient states he thinks he would benefit from a more long-term treatment program.  Patient denies any chest pain shortness of breath, dizziness or blurred vision, any other acute complaints at this time.      History provided by:  Patient          Patient History   Past Medical History:   Diagnosis Date    Alcohol use     Depression     Hypertension      No past surgical history on file.  Family History   Problem Relation Name Age of Onset    Hypertension Other      Heart disease Other       Social History     Tobacco Use    Smoking status: Every Day     Types: Cigarettes    Smokeless tobacco: Not on file   Substance Use Topics    Alcohol use: Yes     Comment: 3 pints of vodka a day    Drug use: Yes     Types: Marijuana       Physical Exam   ED Triage Vitals [08/13/24 2226]   Temperature Heart Rate Respirations BP   36.3 °C (97.3 °F) (!) 107 20 86/62      Pulse Ox Temp Source Heart Rate Source Patient Position   95 % Tympanic -- Sitting      BP Location FiO2 (%)     Left arm --       Physical Exam  Vitals and nursing note reviewed. Exam conducted with a chaperone present.   Constitutional:       General: He is not in acute distress.     Appearance: Normal appearance. He is normal weight. He is not ill-appearing or toxic-appearing.   HENT:      Head:  Normocephalic and atraumatic.      Nose: Nose normal.      Mouth/Throat:      Mouth: Mucous membranes are moist.      Pharynx: Oropharynx is clear.   Eyes:      Extraocular Movements: Extraocular movements intact.      Conjunctiva/sclera: Conjunctivae normal.      Pupils: Pupils are equal, round, and reactive to light.   Cardiovascular:      Rate and Rhythm: Normal rate and regular rhythm.      Pulses: Normal pulses.      Heart sounds: Normal heart sounds.   Pulmonary:      Effort: Pulmonary effort is normal.      Breath sounds: Normal breath sounds.   Abdominal:      General: Abdomen is flat. Bowel sounds are normal.      Palpations: Abdomen is soft.   Musculoskeletal:         General: Normal range of motion.   Skin:     General: Skin is warm and dry.      Capillary Refill: Capillary refill takes less than 2 seconds.   Neurological:      General: No focal deficit present.      Mental Status: He is alert. Mental status is at baseline.   Psychiatric:         Mood and Affect: Mood normal.         Behavior: Behavior normal.         Thought Content: Thought content normal.         Judgment: Judgment normal.           ED Course & MDM   ED Course as of 08/14/24 0511   Tue Aug 13, 2024   2257 EKG interpreted by myself independently, EKG shows rate of 90 bpm, HI interval 172, QRS 88, , QTc 415, patient has normal axis, normal R wave progression, no ST elevation or depression, negative for acute MI [ROLANDO]      ED Course User Index  [ROLANDO] Evan Hernandes DO         Diagnoses as of 08/14/24 0511   ETOH abuse                 No data recorded     [place] Coma Scale Score: 15 (08/14/24 0157 : Lon Jc RN)                           Medical Decision Making  Patient seen and evaluated at bedside, patient is in no acute distress.  I will order a psychiatric workup, ethanol level, psychiatric social social work assessment. Differential diagnosis includes but is not limited to alcohol intoxication, alcohol abuse.  Social work met  with the patient, patient does not qualify for readmission to [facility], or any other treatment places right now.  Patient be given outpatient resources that he may follow-up with on his own if he would so desire.  On repeat ethanol, patient is now 0.04.  Patient be discharged, vies follow-up with the outpatient resources provided to him, return precautions were discussed with patient, he is agreeable with discharge plan.  Diagnosis: Alcohol intoxication        Procedure  Procedures    Sections of this report were created using voice-to-text technology and may contain errors in translation    Evan Hernandes DO  Emergency Medicine         Evan Hernandes DO  08/14/24 0511

## 2024-08-14 NOTE — PROGRESS NOTES
"Social Work Note  Pt is a 65 y/o M presents to [facility] for alcohol detox. Pts BAL upon arrival .162 Pt reports he was at [facility] for alcohol detox on 8/4 and states \"they never should have let me go\" \"I can't be trusted\" \"I don't know what I did\" Pt states \"I can't be on my own look what I have done\". Pt states he has bao drinking and left there and looked for alcohol as soon as he left. He states he has been on a \"martinez\". He states \"I am not gonna give up\". Pt is unsure if he declined 30 day treatment and longer term treatment or if his insurance did not cover it. He states he did not follow up with AA meetings or the appt he had with someone. He states he did not go back to his sisters and has been homeless. \"I have screwed up so many times\" Pt states he does not think he has any supports anymore. He was positive for barbituates and cannabis. Pt has been to [facility] for detox before and has a long hx of alcohol use disorder. Pt sees Cheyenne Samayoa @ [place]. Family hx of alcohol use with pts father. Pt drinks 1-2 pints of vodka per day. He has been drinking most of his adult life. It has become a problem more recenlty due to legal issues and homelessness due to his drinking. Withdrawals include shakes, tremors anxiety normally. He does black out when he drinks. Pt would like to see if he is ieligible to return to [facility]   TCF [facility]- pt not eligible for detox as he was discharged 1.5 days ago. They no longer have the 30 day program at this time. Discussed with pt who will be discharged with rehab sober living list and shelter information. NO other needs reported at this time.  " numerical 0-10

## 2024-08-14 NOTE — DISCHARGE INSTRUCTIONS
You are seen today for alcohol use, please follow-up with the resources provided to you by our social work/psychiatric team.  Please return to the ER for worsening symptoms.

## 2024-08-17 LAB
ATRIAL RATE: 90 BPM
P AXIS: 68 DEGREES
P OFFSET: 187 MS
P ONSET: 140 MS
PR INTERVAL: 172 MS
Q ONSET: 226 MS
QRS COUNT: 15 BEATS
QRS DURATION: 88 MS
QT INTERVAL: 340 MS
QTC CALCULATION(BAZETT): 415 MS
QTC FREDERICIA: 389 MS
R AXIS: 76 DEGREES
T AXIS: 50 DEGREES
T OFFSET: 396 MS
VENTRICULAR RATE: 90 BPM

## 2024-09-15 ENCOUNTER — APPOINTMENT (OUTPATIENT)
Dept: RADIOLOGY | Facility: HOSPITAL | Age: 64
End: 2024-09-15
Payer: COMMERCIAL

## 2024-09-15 ENCOUNTER — HOSPITAL ENCOUNTER (EMERGENCY)
Facility: HOSPITAL | Age: 64
Discharge: OTHER INSTITUTION | End: 2024-09-16
Attending: EMERGENCY MEDICINE
Payer: COMMERCIAL

## 2024-09-15 ENCOUNTER — APPOINTMENT (OUTPATIENT)
Dept: CARDIOLOGY | Facility: HOSPITAL | Age: 64
End: 2024-09-15
Payer: COMMERCIAL

## 2024-09-15 DIAGNOSIS — F10.920 ALCOHOLIC INTOXICATION WITHOUT COMPLICATION (CMS-HCC): Primary | ICD-10-CM

## 2024-09-15 LAB
ALBUMIN SERPL-MCNC: 4.6 G/DL (ref 3.5–5)
ALP BLD-CCNC: 90 U/L (ref 35–125)
ALT SERPL-CCNC: 13 U/L (ref 5–40)
ANION GAP SERPL CALC-SCNC: 17 MMOL/L
APAP SERPL-MCNC: <5 UG/ML
AST SERPL-CCNC: 24 U/L (ref 5–40)
BASOPHILS # BLD AUTO: 0.06 X10*3/UL (ref 0–0.1)
BASOPHILS NFR BLD AUTO: 0.6 %
BILIRUB SERPL-MCNC: 0.2 MG/DL (ref 0.1–1.2)
BUN SERPL-MCNC: 17 MG/DL (ref 8–25)
CALCIUM SERPL-MCNC: 9.5 MG/DL (ref 8.5–10.4)
CHLORIDE SERPL-SCNC: 99 MMOL/L (ref 97–107)
CO2 SERPL-SCNC: 21 MMOL/L (ref 24–31)
CREAT SERPL-MCNC: 1.1 MG/DL (ref 0.4–1.6)
EGFRCR SERPLBLD CKD-EPI 2021: 76 ML/MIN/1.73M*2
EOSINOPHIL # BLD AUTO: 0.07 X10*3/UL (ref 0–0.7)
EOSINOPHIL NFR BLD AUTO: 0.7 %
ERYTHROCYTE [DISTWIDTH] IN BLOOD BY AUTOMATED COUNT: 12.3 % (ref 11.5–14.5)
ETHANOL SERPL-MCNC: 0.3 G/DL
GLUCOSE SERPL-MCNC: 105 MG/DL (ref 65–99)
HCT VFR BLD AUTO: 42.5 % (ref 41–52)
HGB BLD-MCNC: 15.1 G/DL (ref 13.5–17.5)
IMM GRANULOCYTES # BLD AUTO: 0.02 X10*3/UL (ref 0–0.7)
IMM GRANULOCYTES NFR BLD AUTO: 0.2 % (ref 0–0.9)
LYMPHOCYTES # BLD AUTO: 4.31 X10*3/UL (ref 1.2–4.8)
LYMPHOCYTES NFR BLD AUTO: 44.1 %
MCH RBC QN AUTO: 32.3 PG (ref 26–34)
MCHC RBC AUTO-ENTMCNC: 35.5 G/DL (ref 32–36)
MCV RBC AUTO: 91 FL (ref 80–100)
MONOCYTES # BLD AUTO: 0.62 X10*3/UL (ref 0.1–1)
MONOCYTES NFR BLD AUTO: 6.3 %
NEUTROPHILS # BLD AUTO: 4.69 X10*3/UL (ref 1.2–7.7)
NEUTROPHILS NFR BLD AUTO: 48.1 %
NRBC BLD-RTO: 0 /100 WBCS (ref 0–0)
PLATELET # BLD AUTO: 298 X10*3/UL (ref 150–450)
POTASSIUM SERPL-SCNC: 4.6 MMOL/L (ref 3.4–5.1)
PROT SERPL-MCNC: 8 G/DL (ref 5.9–7.9)
RBC # BLD AUTO: 4.68 X10*6/UL (ref 4.5–5.9)
SALICYLATES SERPL-MCNC: <0 MG/DL
SODIUM SERPL-SCNC: 137 MMOL/L (ref 133–145)
WBC # BLD AUTO: 9.8 X10*3/UL (ref 4.4–11.3)

## 2024-09-15 PROCEDURE — 2500000001 HC RX 250 WO HCPCS SELF ADMINISTERED DRUGS (ALT 637 FOR MEDICARE OP): Performed by: EMERGENCY MEDICINE

## 2024-09-15 PROCEDURE — 85025 COMPLETE CBC W/AUTO DIFF WBC: CPT | Performed by: PHYSICIAN ASSISTANT

## 2024-09-15 PROCEDURE — 81001 URINALYSIS AUTO W/SCOPE: CPT | Mod: 59 | Performed by: PHYSICIAN ASSISTANT

## 2024-09-15 PROCEDURE — 70450 CT HEAD/BRAIN W/O DYE: CPT | Performed by: STUDENT IN AN ORGANIZED HEALTH CARE EDUCATION/TRAINING PROGRAM

## 2024-09-15 PROCEDURE — 93005 ELECTROCARDIOGRAM TRACING: CPT

## 2024-09-15 PROCEDURE — 71045 X-RAY EXAM CHEST 1 VIEW: CPT | Performed by: RADIOLOGY

## 2024-09-15 PROCEDURE — 96374 THER/PROPH/DIAG INJ IV PUSH: CPT | Performed by: EMERGENCY MEDICINE

## 2024-09-15 PROCEDURE — 70450 CT HEAD/BRAIN W/O DYE: CPT

## 2024-09-15 PROCEDURE — 81003 URINALYSIS AUTO W/O SCOPE: CPT | Performed by: PHYSICIAN ASSISTANT

## 2024-09-15 PROCEDURE — 80307 DRUG TEST PRSMV CHEM ANLYZR: CPT | Performed by: PHYSICIAN ASSISTANT

## 2024-09-15 PROCEDURE — 80053 COMPREHEN METABOLIC PANEL: CPT | Performed by: PHYSICIAN ASSISTANT

## 2024-09-15 PROCEDURE — 2500000004 HC RX 250 GENERAL PHARMACY W/ HCPCS (ALT 636 FOR OP/ED): Performed by: EMERGENCY MEDICINE

## 2024-09-15 PROCEDURE — 36415 COLL VENOUS BLD VENIPUNCTURE: CPT | Performed by: PHYSICIAN ASSISTANT

## 2024-09-15 PROCEDURE — 71045 X-RAY EXAM CHEST 1 VIEW: CPT

## 2024-09-15 PROCEDURE — 80143 DRUG ASSAY ACETAMINOPHEN: CPT | Performed by: PHYSICIAN ASSISTANT

## 2024-09-15 PROCEDURE — 99285 EMERGENCY DEPT VISIT HI MDM: CPT | Mod: 25 | Performed by: EMERGENCY MEDICINE

## 2024-09-15 RX ORDER — DIAZEPAM 5 MG/1
10 TABLET ORAL ONCE AS NEEDED
Status: DISCONTINUED | OUTPATIENT
Start: 2024-09-15 | End: 2024-09-16 | Stop reason: HOSPADM

## 2024-09-15 RX ORDER — LANOLIN ALCOHOL/MO/W.PET/CERES
100 CREAM (GRAM) TOPICAL DAILY
Status: DISCONTINUED | OUTPATIENT
Start: 2024-09-18 | End: 2024-09-15

## 2024-09-15 RX ORDER — MULTIVIT-MIN/IRON FUM/FOLIC AC 7.5 MG-4
1 TABLET ORAL ONCE
Status: COMPLETED | OUTPATIENT
Start: 2024-09-15 | End: 2024-09-15

## 2024-09-15 RX ORDER — THIAMINE HYDROCHLORIDE 100 MG/ML
100 INJECTION, SOLUTION INTRAMUSCULAR; INTRAVENOUS ONCE
Status: COMPLETED | OUTPATIENT
Start: 2024-09-15 | End: 2024-09-15

## 2024-09-15 RX ORDER — FOLIC ACID 1 MG/1
1 TABLET ORAL ONCE
Status: COMPLETED | OUTPATIENT
Start: 2024-09-15 | End: 2024-09-15

## 2024-09-15 ASSESSMENT — COLUMBIA-SUICIDE SEVERITY RATING SCALE - C-SSRS
1. IN THE PAST MONTH, HAVE YOU WISHED YOU WERE DEAD OR WISHED YOU COULD GO TO SLEEP AND NOT WAKE UP?: YES
6. HAVE YOU EVER DONE ANYTHING, STARTED TO DO ANYTHING, OR PREPARED TO DO ANYTHING TO END YOUR LIFE?: NO
6. HAVE YOU EVER DONE ANYTHING, STARTED TO DO ANYTHING, OR PREPARED TO DO ANYTHING TO END YOUR LIFE?: YES
2. HAVE YOU ACTUALLY HAD ANY THOUGHTS OF KILLING YOURSELF?: NO

## 2024-09-15 ASSESSMENT — LIFESTYLE VARIABLES
HAVE YOU EVER FELT YOU SHOULD CUT DOWN ON YOUR DRINKING: YES
TOTAL SCORE: 4
EVER FELT BAD OR GUILTY ABOUT YOUR DRINKING: YES
EVER HAD A DRINK FIRST THING IN THE MORNING TO STEADY YOUR NERVES TO GET RID OF A HANGOVER: YES
HAVE PEOPLE ANNOYED YOU BY CRITICIZING YOUR DRINKING: YES

## 2024-09-15 ASSESSMENT — PAIN SCALES - GENERAL: PAINLEVEL_OUTOF10: 0 - NO PAIN

## 2024-09-15 ASSESSMENT — PAIN - FUNCTIONAL ASSESSMENT: PAIN_FUNCTIONAL_ASSESSMENT: 0-10

## 2024-09-15 NOTE — ED PROVIDER NOTES
HPI   Chief Complaint   Patient presents with    Alcohol Intoxication       This is a 64-year-old male presenting to the emergency department by  for acute alcohol intoxication.  Patient was found sitting on a curb.  He reportedly drank 2 pints of alcohol today.  He drinks alcohol daily.  He is acutely intoxicated upon presentation.  He states he wishes he would not wake up in the morning.  He denies any homicidal ideations.  He denies any drug use.  Patient states that he needs help.  He states that he is sick.        Please see HPI for pertinent positive and negative ROS.       Patient History   Past Medical History:   Diagnosis Date    Alcohol use     Depression     Hypertension      No past surgical history on file.  Family History   Problem Relation Name Age of Onset    Hypertension Other      Heart disease Other       Social History     Tobacco Use    Smoking status: Every Day     Types: Cigarettes    Smokeless tobacco: Not on file   Substance Use Topics    Alcohol use: Yes     Comment: 3 pints of vodka a day    Drug use: Yes     Types: Marijuana       Physical Exam   ED Triage Vitals [09/15/24 1824]   Temperature Heart Rate Respirations BP   36.6 °C (97.9 °F) (!) 112 18 (!) 167/118      Pulse Ox Temp src Heart Rate Source Patient Position   98 % -- -- --      BP Location FiO2 (%)     -- --       Physical Exam  GENERAL APPEARANCE: Awake and alert. No acute respiratory distress.  Patient is acutely intoxicated.  VITAL SIGNS: As per the nurses' triage record.  HEENT: Normocephalic, atraumatic.  NECK: Soft, nontender and supple  CHEST: Nontender to palpation. Clear to auscultation bilaterally.  Symmetric rise and fall of chest wall.   HEART: Clear S1 and S2. Regular rate and rhythm.   ABDOMEN: Soft, nontender, nondistended  MUSCULOSKELETAL: Full gross active range of motion. Ambulating on own with no acute difficulties  NEUROLOGICAL: Awake, alert and oriented x 3. Motor power intact in the  upper and lower extremities. Sensation is intact to light touch in the upper and lower extremities. Patient answering questions appropriately.   IMMUNOLOGICAL: No lymphatic streaking noted  DERMATOLOGIC: Warm and dry   PYSCH: Cooperative with appropriate mood and affect.    ED Course & MDM   Diagnoses as of 09/16/24 0119   Alcoholic intoxication without complication (CMS-HCC)                 No data recorded     [place] Coma Scale Score: 15 (09/15/24 1912 : Rogerio Rankin RN)                           Medical Decision Making  Parts of this chart have been completed using voice recognition software. Please excuse any errors of transcription.  My thought process and reason for plan has been formulated from the time that I saw the patient until the time of disposition and is not specific to one specific moment during their visit and furthermore my MDM encompasses this entire chart and not only this text box.      HPI: Detailed above.    Exam: A medically appropriate exam performed, outlined above, given the known history and presentation.    History obtained from: Patient    EKG: See my supervising physician's EKG interpretation    Medications given during visit:  Medications   diazePAM (Valium) tablet 10 mg (has no administration in time range)   folic acid (Folvite) tablet 1 mg (1 mg oral Given 9/15/24 1946)   multivitamin with minerals 1 tablet (1 tablet oral Given 9/15/24 1946)   thiamine (Vitamin B1) injection 100 mg (100 mg intravenous Given 9/15/24 1946)        Diagnostic/tests  Labs Reviewed   COMPREHENSIVE METABOLIC PANEL - Abnormal       Result Value    Glucose 105 (*)     Sodium 137      Potassium 4.6      Chloride 99      Bicarbonate 21 (*)     Urea Nitrogen 17      Creatinine 1.10      eGFR 76      Calcium 9.5      Albumin 4.6      Alkaline Phosphatase 90      Total Protein 8.0 (*)     AST 24      Bilirubin, Total 0.2      ALT 13      Anion Gap 17     DRUG SCREEN,URINE - Abnormal    Amphetamine Screen, Urine  Negative      Barbiturate Screen, Urine Positive (*)     Benzodiazepines Screen, Urine Negative      Cannabinoid Screen, Urine Negative      Cocaine Metabolite Screen, Urine Negative      Fentanyl Screen, Urine Negative      Methadone Screen, Urine Negative      Opiate Screen, Urine Negative      Oxycodone Screen, Urine Negative      PCP Screen, Urine Negative      Narrative:     These toxicological screening tests provide unconfirmed qualitative measurements to aid in treatment and diagnosis in cases of drug use or overdose. This test is used only for medical purposes. A positive result does not indicate or measure intoxication. For specific test performance or pathologist consultation, please contact the Laboratory.    The following threshold concentrations are used for these analyses.Values at or above the threshold concentration are reported as positive. Values below the threshold are reported as negative.    Drug /Screening Threshold                                                                                                 THC/CANNABINOIDS................50 ng/ml  METHADONE......................300 ng/ml  COCAINE METABOLITES............300 ng/ml  BENZODIAZEPINE.................300 ng/ml  PCP.............................25 ng/ml  OPIATE.........................300 ng/ml  AMPHETAMINE/ECSTASY...........1000 ng/ml  BARBITURATE....................200 ng/ml  OXYCODONE......................100 ng/ml  FENTANYL.........................5 ng/ml       ACUTE TOXICOLOGY PANEL, BLOOD - Abnormal    Acetaminophen <5.0      Salicylate  <0      Alcohol 0.300 (*)    URINALYSIS WITH REFLEX CULTURE AND MICROSCOPIC - Abnormal    Color, Urine Yellow      Appearance, Urine Clear      Specific Gravity, Urine 1.023      pH, Urine 5.0      Protein, Urine 10 (TRACE)      Glucose, Urine Normal      Blood, Urine 0.03 (TRACE) (*)     Ketones, Urine NEGATIVE      Bilirubin, Urine NEGATIVE      Urobilinogen, Urine Normal      Nitrite,  Urine NEGATIVE      Leukocyte Esterase, Urine NEGATIVE     URINALYSIS MICROSCOPIC WITH REFLEX CULTURE - Abnormal    WBC, Urine 1-5      RBC, Urine NONE      Mucus, Urine FEW      Hyaline Casts, Urine 2+ (*)     Calcium Oxalate Crystals, Urine 1+     CBC WITH AUTO DIFFERENTIAL    WBC 9.8      nRBC 0.0      RBC 4.68      Hemoglobin 15.1      Hematocrit 42.5      MCV 91      MCH 32.3      MCHC 35.5      RDW 12.3      Platelets 298      Neutrophils % 48.1      Immature Granulocytes %, Automated 0.2      Lymphocytes % 44.1      Monocytes % 6.3      Eosinophils % 0.7      Basophils % 0.6      Neutrophils Absolute 4.69      Immature Granulocytes Absolute, Automated 0.02      Lymphocytes Absolute 4.31      Monocytes Absolute 0.62      Eosinophils Absolute 0.07      Basophils Absolute 0.06     URINALYSIS WITH REFLEX CULTURE AND MICROSCOPIC    Narrative:     The following orders were created for panel order Urinalysis with Reflex Culture and Microscopic.  Procedure                               Abnormality         Status                     ---------                               -----------         ------                     Urinalysis with Reflex C...[[id number]]  Abnormal            Final result               Extra Urine Gray Tube[[id number]]                            In process                   Please view results for these tests on the individual orders.   EXTRA URINE GRAY TUBE   ALCOHOL      CT head wo IV contrast   Final Result   No CT evidence of acute large vessel territory infarct or acute   hemorrhage.        MACRO   None        Signed by: Hua Mccall 9/15/2024 8:27 PM   Dictation workstation:   [id number]      XR chest 1 view   Final Result   No airspace consolidation or pleural effusion.        MACRO:   None        Signed by: Gurmeet Rodriguez 9/15/2024 8:01 PM   Dictation workstation:   [id number]           Considerations/further MDM:  Patient was seen in conjucntion with my supervising physician,  Dr. Diaz.  Please refer to her note.     this is a 64-year-old male presenting to the emergency department by law enforcement officers for acute alcohol intoxication.  Patient is medically cleared for inpatient detox.   did evaluate the patient and patient was recently discharged from an inpatient detox program.  Therefore he will likely not be a candidate for inpatient detox from alcohol at this time.  However, the patient is currently endorsing suicidal ideations.  therefore,  plans to reassess patient once he is clinically sober and determine if he requires inpatient placement for suicidal ideations.  Please refer to my oncoming supervising physician for further management disposition of this patient while he remains in the emergency department.  End of my shift is 0100 on 9/16/2024    Procedure  Procedures     Vane Lee PA-C  09/16/24 0117       Vane Lee PA-C  09/16/24 0119

## 2024-09-15 NOTE — PROGRESS NOTES
Attestation/Supervisory note for RIGO Lee      The patient is a 64-year-old male presenting to the emergency department by a local law enforcement officers for evaluation of intoxication.  The patient was recently in a rehab/detox facility but reportedly left detox and wants to go back to detox.  He reportedly does drink daily but does not know exactly when he last drank.  He denies any headache or visual changes.  No chest pain or shortness of breath.  No abdominal pain.  No nausea vomiting.  No diarrhea or constipation.  No urinary complaints.  No focal weakness or numbness.  No fever or chills.  No cough or congestion.  All pertinent positives and negatives are recorded above.  All other systems reviewed and otherwise negative.  Vital signs with hypertension and tachycardia but otherwise within normal limits.  Physical exam with a well-nourished well-developed male with disheveled appearance poor hygiene but no evidence of acute distress.  HEENT exam with dry mucous membranes but otherwise unremarkable.  He has no evidence of airway compromise or respiratory distress.  Abdominal exam is benign.  He does not have any gross motor, neurologic or vascular deficits on exam other than he has some difficulty walking and standing without stumbling.  NIH stroke scale score of 0 at this time.      tPA/TNK is not indicated given last known well time not being known and the patient having an NIH stroke scale score of 0 at this time.      EKG with sinus tachycardia at 115 bpm, normal axis, normal voltage, normal ST segment, normal T waves there is some mild limitation by motion artifact.      Banana bag and CIWA protocol ordered.      Diagnostic labs with evidence of alcohol intoxication and mild electrolyte imbalance but otherwise unremarkable.      The urine for UA/UTOX screening was pending at the time my departure.      CT head wo IV contrast   Final Result   No CT evidence of acute large vessel territory infarct or  acute   hemorrhage.        MACRO   None        Signed by: AleeMaulik Vasquezan 9/15/2024 8:27 PM   Dictation workstation:   [id number]      XR chest 1 view   Final Result   No airspace consolidation or pleural effusion.        MACRO:   None        Signed by: Gurmeet Bolanoscher 9/15/2024 8:01 PM   Dictation workstation:   [id number]           The patient did not have any evidence of airway compromise or respiratory distress on exam.  He was unsteady with attempts to ambulate when he first arrived but he does show evidence of alcohol intoxication and diagnostic labs.  CT head without evidence of acute process.  No evidence of intracranial hemorrhage, mass effect and/or CVA.  Chest x-ray without acute process.  No evidence of pneumonia or pneumothorax.  No evidence of CHF.  No widening of the mediastinum.  EKG did not show any evidence of ischemia or arrhythmia.  A CIWA protocol and banana bag was ordered.      Crisis intervention was consulted and will attempt placement for inpatient detox once the urinalysis results are available for medical clearance.      RIGO Pinto will continue managing patient primarily.  Anticipate placement for inpatient detox once the results of the urinalysis and urine tox screen are available.      Impression/diagnosis:  1.  Alcohol abuse/dependence/intoxication  2.  Hypertension, unspecified      Critical care time of  17 minutes billed for management of alcohol intoxication/on studies with assessment of the patient with NIH stroke scale, consideration of tPA inclusion and exclusion criteria, initiation of the CIWA protocol and admit IV banana bag, monitoring of the patient on telemetry.  This time excludes time for billable procedures.      critical care time billed for by me is non concurrent with time billed for by RIGO Pinto      I personally saw the patient and made/approve the management plan and take responsibility for the patient management.      I independently interpreted the  following study (S) EKG and diagnostic labs      I personally discussed the patient's management with the patient and the crisis team      I reviewed the results of the diagnostic labs and diagnostic imaging.  Formal radiology read was completed by the radiologist.      Itzel Diaz MD

## 2024-09-15 NOTE — ED TRIAGE NOTES
Pt brought in by PD for alcohol intoxication. Pt requests detox. Pt is very tearful but cooperative. Pt admits to drinking 2 pints of liquor today. Pt drinks a few pints and beer a day.

## 2024-09-16 VITALS
DIASTOLIC BLOOD PRESSURE: 71 MMHG | TEMPERATURE: 97.9 F | HEART RATE: 75 BPM | SYSTOLIC BLOOD PRESSURE: 112 MMHG | HEIGHT: 72 IN | BODY MASS INDEX: 24.38 KG/M2 | WEIGHT: 180 LBS | RESPIRATION RATE: 19 BRPM | OXYGEN SATURATION: 99 %

## 2024-09-16 LAB
AMPHETAMINES UR QL SCN>1000 NG/ML: NEGATIVE
APPEARANCE UR: CLEAR
BARBITURATES UR QL SCN>300 NG/ML: POSITIVE
BENZODIAZ UR QL SCN>300 NG/ML: NEGATIVE
BILIRUB UR STRIP.AUTO-MCNC: NEGATIVE MG/DL
BZE UR QL SCN>300 NG/ML: NEGATIVE
CANNABINOIDS UR QL SCN>50 NG/ML: NEGATIVE
CAOX CRY #/AREA UR COMP ASSIST: ABNORMAL /HPF
COLOR UR: YELLOW
FENTANYL+NORFENTANYL UR QL SCN: NEGATIVE
GLUCOSE UR STRIP.AUTO-MCNC: NORMAL MG/DL
HOLD SPECIMEN: NORMAL
HYALINE CASTS #/AREA URNS AUTO: ABNORMAL /LPF
KETONES UR STRIP.AUTO-MCNC: NEGATIVE MG/DL
LEUKOCYTE ESTERASE UR QL STRIP.AUTO: NEGATIVE
METHADONE UR QL SCN>300 NG/ML: NEGATIVE
MUCOUS THREADS #/AREA URNS AUTO: ABNORMAL /LPF
NITRITE UR QL STRIP.AUTO: NEGATIVE
OPIATES UR QL SCN>300 NG/ML: NEGATIVE
OXYCODONE UR QL: NEGATIVE
PCP UR QL SCN>25 NG/ML: NEGATIVE
PH UR STRIP.AUTO: 5 [PH]
PROT UR STRIP.AUTO-MCNC: ABNORMAL MG/DL
RBC # UR STRIP.AUTO: ABNORMAL /UL
RBC #/AREA URNS AUTO: ABNORMAL /HPF
SP GR UR STRIP.AUTO: 1.02
UROBILINOGEN UR STRIP.AUTO-MCNC: NORMAL MG/DL
WBC #/AREA URNS AUTO: ABNORMAL /HPF

## 2024-09-16 PROCEDURE — 90839 PSYTX CRISIS INITIAL 60 MIN: CPT

## 2024-09-16 PROCEDURE — 36415 COLL VENOUS BLD VENIPUNCTURE: CPT | Performed by: EMERGENCY MEDICINE

## 2024-09-16 SDOH — HEALTH STABILITY: MENTAL HEALTH: ACTIVE SUICIDAL IDEATION WITH SPECIFIC PLAN AND INTENT (PAST 1 MONTH): YES

## 2024-09-16 SDOH — HEALTH STABILITY: MENTAL HEALTH: IN THE PAST FEW WEEKS, HAVE YOU WISHED YOU WERE DEAD?: YES

## 2024-09-16 SDOH — HEALTH STABILITY: MENTAL HEALTH: BEHAVIORAL HEALTH(WDL): WITHIN DEFINED LIMITS

## 2024-09-16 SDOH — HEALTH STABILITY: MENTAL HEALTH: HAVE YOU EVER TRIED TO KILL YOURSELF?: YES

## 2024-09-16 SDOH — HEALTH STABILITY: MENTAL HEALTH: IN THE PAST FEW WEEKS, HAVE YOU FELT THAT YOU OR YOUR FAMILY WOULD BE BETTER OFF IF YOU WERE DEAD?: YES

## 2024-09-16 SDOH — HEALTH STABILITY: MENTAL HEALTH: ANXIETY SYMPTOMS: GENERALIZED;PANIC ATTACK

## 2024-09-16 SDOH — HEALTH STABILITY: MENTAL HEALTH: SUICIDAL BEHAVIOR (LIFETIME): YES

## 2024-09-16 SDOH — HEALTH STABILITY: MENTAL HEALTH: WHEN DID YOU TRY TO KILL YOURSELF?: 2017

## 2024-09-16 SDOH — HEALTH STABILITY: MENTAL HEALTH: ACTIVE SUICIDAL IDEATION WITH SOME INTENT TO ACT, WITHOUT SPECIFIC PLAN (PAST 1 MONTH): YES

## 2024-09-16 SDOH — HEALTH STABILITY: MENTAL HEALTH: WISH TO BE DEAD (PAST 1 MONTH): YES

## 2024-09-16 SDOH — HEALTH STABILITY: MENTAL HEALTH: DESCRIBE YOUR THOUGHTS OF KILLING YOURSELF RIGHT NOW:: DRINK SELF TO DEATH

## 2024-09-16 SDOH — HEALTH STABILITY: MENTAL HEALTH: HOW DID YOU TRY TO KILL YOURSELF?: OVERDOSE ON PILLS

## 2024-09-16 SDOH — HEALTH STABILITY: MENTAL HEALTH: NON-SPECIFIC ACTIVE SUICIDAL THOUGHTS (PAST 1 MONTH): YES

## 2024-09-16 SDOH — HEALTH STABILITY: MENTAL HEALTH: ARE YOU HAVING THOUGHTS OF KILLING YOURSELF RIGHT NOW?: YES

## 2024-09-16 SDOH — HEALTH STABILITY: MENTAL HEALTH: IN THE PAST WEEK, HAVE YOU BEEN HAVING THOUGHTS ABOUT KILLING YOURSELF?: YES

## 2024-09-16 SDOH — HEALTH STABILITY: MENTAL HEALTH: DEPRESSION SYMPTOMS: CRYING;FEELINGS OF HOPELESSESS;FEELINGS OF HELPLESSNESS;FEELINGS OF WORTHLESSNESS;ISOLATIVE

## 2024-09-16 SDOH — HEALTH STABILITY: MENTAL HEALTH: SUICIDAL BEHAVIOR (DESCRIPTION): PAST OVERDOSE ATTEMPT

## 2024-09-16 SDOH — HEALTH STABILITY: MENTAL HEALTH: SUICIDAL BEHAVIOR (3 MONTHS): NO

## 2024-09-16 ASSESSMENT — LIFESTYLE VARIABLES
AUDITORY DISTURBANCES: NOT PRESENT
HEADACHE, FULLNESS IN HEAD: VERY MILD
SUBSTANCE_ABUSE_PAST_12_MONTHS: YES
TOTAL SCORE: 3
VISUAL DISTURBANCES: NOT PRESENT
AGITATION: NORMAL ACTIVITY
PAROXYSMAL SWEATS: NO SWEAT VISIBLE
PRESCIPTION_ABUSE_PAST_12_MONTHS: NO
ORIENTATION AND CLOUDING OF SENSORIUM: ORIENTED AND CAN DO SERIAL ADDITIONS
NAUSEA AND VOMITING: NO NAUSEA AND NO VOMITING
ANXIETY: MILDLY ANXIOUS
TREMOR: NOT VISIBLE, BUT CAN BE FELT FINGERTIP TO FINGERTIP

## 2024-09-16 ASSESSMENT — COLUMBIA-SUICIDE SEVERITY RATING SCALE - C-SSRS
6. HAVE YOU EVER DONE ANYTHING, STARTED TO DO ANYTHING, OR PREPARED TO DO ANYTHING TO END YOUR LIFE?: YES
5. HAVE YOU STARTED TO WORK OUT OR WORKED OUT THE DETAILS OF HOW TO KILL YOURSELF? DO YOU INTEND TO CARRY OUT THIS PLAN?: NO
2. HAVE YOU ACTUALLY HAD ANY THOUGHTS OF KILLING YOURSELF?: YES
1. SINCE LAST CONTACT, HAVE YOU WISHED YOU WERE DEAD OR WISHED YOU COULD GO TO SLEEP AND NOT WAKE UP?: NO

## 2024-09-16 ASSESSMENT — PAIN SCALES - GENERAL: PAINLEVEL_OUTOF10: 0 - NO PAIN

## 2024-09-16 NOTE — ED NOTES
Pt states that he was not sure if he had belongings. There is nothing documented prior to this note, there is nothing in the  lockers for this patient. Pt does not recall if he had belongings or not..      Hina Ratliff RN  09/16/24 0911
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
## 2024-09-16 NOTE — ED PROVIDER NOTES
Patient received in sign out from Dr Moncada. Patient with alcohol abuse and suicidal ideation. Pending crisis evaluation.    Patient accepted at [facility].     Nitin Murphy MD  09/16/24 0946

## 2024-09-16 NOTE — ED PROVIDER NOTES
Patient was received in signout at 1:34 AM.     IN BRIEF    64M presents after recent discharge from alcohol detox requesting detox and endorsing SI. At the time of handoff awaiting repeat alcohol for sobriety and social work evaluation.        ED COURSE   Patient resting comfortably throughout shift.  Signed out to oncoming physician pending repeat alcohol and disposition  Diagnoses as of 09/16/24 0134   Alcoholic intoxication without complication (CMS-HCC)         FINAL IMPRESSION      1. Alcoholic intoxication without complication (CMS-HCC)          DISPOSITION          Eleni Moncada,   09/16/24 0530

## 2024-09-16 NOTE — ED NOTES
Sherry signed by this rn, another rn (Pt is pink slipped) and signed by ED provider. Report is called. Pt can go over to [facility] at 1015am.     Hina Ratliff RN  09/16/24 0679

## 2024-09-18 LAB
ATRIAL RATE: 115 BPM
P AXIS: 71 DEGREES
P OFFSET: 181 MS
P ONSET: 128 MS
PR INTERVAL: 166 MS
Q ONSET: 211 MS
QRS COUNT: 19 BEATS
QRS DURATION: 96 MS
QT INTERVAL: 312 MS
QTC CALCULATION(BAZETT): 431 MS
QTC FREDERICIA: 387 MS
R AXIS: 72 DEGREES
T AXIS: 55 DEGREES
T OFFSET: 367 MS
VENTRICULAR RATE: 115 BPM

## 2024-10-05 ENCOUNTER — HOSPITAL ENCOUNTER (EMERGENCY)
Facility: HOSPITAL | Age: 64
Discharge: OTHER INSTITUTION | End: 2024-10-06
Attending: STUDENT IN AN ORGANIZED HEALTH CARE EDUCATION/TRAINING PROGRAM
Payer: COMMERCIAL

## 2024-10-05 DIAGNOSIS — F10.220 ALCOHOL DEPENDENCE WITH UNCOMPLICATED INTOXICATION (MULTI): Primary | ICD-10-CM

## 2024-10-05 PROCEDURE — 81001 URINALYSIS AUTO W/SCOPE: CPT | Mod: 59 | Performed by: PHYSICIAN ASSISTANT

## 2024-10-05 PROCEDURE — 80053 COMPREHEN METABOLIC PANEL: CPT | Performed by: PHYSICIAN ASSISTANT

## 2024-10-05 PROCEDURE — 80143 DRUG ASSAY ACETAMINOPHEN: CPT | Performed by: PHYSICIAN ASSISTANT

## 2024-10-05 PROCEDURE — 80307 DRUG TEST PRSMV CHEM ANLYZR: CPT | Performed by: PHYSICIAN ASSISTANT

## 2024-10-05 PROCEDURE — 85025 COMPLETE CBC W/AUTO DIFF WBC: CPT | Performed by: PHYSICIAN ASSISTANT

## 2024-10-05 PROCEDURE — 99285 EMERGENCY DEPT VISIT HI MDM: CPT

## 2024-10-05 PROCEDURE — 36415 COLL VENOUS BLD VENIPUNCTURE: CPT | Performed by: PHYSICIAN ASSISTANT

## 2024-10-05 PROCEDURE — 87086 URINE CULTURE/COLONY COUNT: CPT | Mod: WESLAB | Performed by: PHYSICIAN ASSISTANT

## 2024-10-05 RX ORDER — LORAZEPAM 2 MG/ML
1 INJECTION INTRAMUSCULAR EVERY 2 HOUR PRN
Status: DISCONTINUED | OUTPATIENT
Start: 2024-10-05 | End: 2024-10-06 | Stop reason: HOSPADM

## 2024-10-05 RX ORDER — LANOLIN ALCOHOL/MO/W.PET/CERES
100 CREAM (GRAM) TOPICAL DAILY
Status: DISCONTINUED | OUTPATIENT
Start: 2024-10-06 | End: 2024-10-06 | Stop reason: HOSPADM

## 2024-10-05 RX ORDER — LORAZEPAM 2 MG/ML
0.5 INJECTION INTRAMUSCULAR EVERY 2 HOUR PRN
Status: DISCONTINUED | OUTPATIENT
Start: 2024-10-05 | End: 2024-10-06 | Stop reason: HOSPADM

## 2024-10-05 RX ORDER — MULTIVIT-MIN/IRON FUM/FOLIC AC 7.5 MG-4
1 TABLET ORAL DAILY
Status: DISCONTINUED | OUTPATIENT
Start: 2024-10-06 | End: 2024-10-06 | Stop reason: HOSPADM

## 2024-10-05 RX ORDER — FOLIC ACID 1 MG/1
1 TABLET ORAL DAILY
Status: DISCONTINUED | OUTPATIENT
Start: 2024-10-06 | End: 2024-10-06 | Stop reason: HOSPADM

## 2024-10-05 RX ORDER — LORAZEPAM 2 MG/ML
2 INJECTION INTRAMUSCULAR EVERY 2 HOUR PRN
Status: DISCONTINUED | OUTPATIENT
Start: 2024-10-05 | End: 2024-10-06 | Stop reason: HOSPADM

## 2024-10-06 ENCOUNTER — APPOINTMENT (OUTPATIENT)
Dept: CARDIOLOGY | Facility: HOSPITAL | Age: 64
End: 2024-10-06
Payer: COMMERCIAL

## 2024-10-06 VITALS
WEIGHT: 175 LBS | HEIGHT: 72 IN | BODY MASS INDEX: 23.7 KG/M2 | SYSTOLIC BLOOD PRESSURE: 135 MMHG | DIASTOLIC BLOOD PRESSURE: 73 MMHG | TEMPERATURE: 98.1 F | HEART RATE: 80 BPM | OXYGEN SATURATION: 100 % | RESPIRATION RATE: 18 BRPM

## 2024-10-06 LAB
ALBUMIN SERPL BCP-MCNC: 3.6 G/DL (ref 3.4–5)
ALP SERPL-CCNC: 71 U/L (ref 33–136)
ALT SERPL W P-5'-P-CCNC: 17 U/L (ref 10–52)
AMPHETAMINES UR QL SCN: ABNORMAL
ANION GAP SERPL CALCULATED.3IONS-SCNC: 15 MMOL/L (ref 10–20)
APAP SERPL-MCNC: <10 UG/ML
APPEARANCE UR: CLEAR
AST SERPL W P-5'-P-CCNC: 17 U/L (ref 9–39)
BACTERIA #/AREA URNS AUTO: ABNORMAL /HPF
BARBITURATES UR QL SCN: ABNORMAL
BASOPHILS # BLD AUTO: 0.05 X10*3/UL (ref 0–0.1)
BASOPHILS NFR BLD AUTO: 0.6 %
BENZODIAZ UR QL SCN: ABNORMAL
BILIRUB SERPL-MCNC: 0.3 MG/DL (ref 0–1.2)
BILIRUB UR STRIP.AUTO-MCNC: NEGATIVE MG/DL
BUN SERPL-MCNC: 15 MG/DL (ref 6–23)
BZE UR QL SCN: ABNORMAL
CALCIUM SERPL-MCNC: 8.1 MG/DL (ref 8.6–10.3)
CANNABINOIDS UR QL SCN: ABNORMAL
CHLORIDE SERPL-SCNC: 107 MMOL/L (ref 98–107)
CO2 SERPL-SCNC: 21 MMOL/L (ref 21–32)
COLOR UR: ABNORMAL
CREAT SERPL-MCNC: 0.68 MG/DL (ref 0.5–1.3)
EGFRCR SERPLBLD CKD-EPI 2021: >90 ML/MIN/1.73M*2
EOSINOPHIL # BLD AUTO: 0.27 X10*3/UL (ref 0–0.7)
EOSINOPHIL NFR BLD AUTO: 3.2 %
ERYTHROCYTE [DISTWIDTH] IN BLOOD BY AUTOMATED COUNT: 13 % (ref 11.5–14.5)
ETHANOL SERPL-MCNC: 51 MG/DL
FENTANYL+NORFENTANYL UR QL SCN: ABNORMAL
GLUCOSE SERPL-MCNC: 92 MG/DL (ref 74–99)
GLUCOSE UR STRIP.AUTO-MCNC: NORMAL MG/DL
HCT VFR BLD AUTO: 33.2 % (ref 41–52)
HGB BLD-MCNC: 11.7 G/DL (ref 13.5–17.5)
HOLD SPECIMEN: NORMAL
IMM GRANULOCYTES # BLD AUTO: 0.02 X10*3/UL (ref 0–0.7)
IMM GRANULOCYTES NFR BLD AUTO: 0.2 % (ref 0–0.9)
KETONES UR STRIP.AUTO-MCNC: NEGATIVE MG/DL
LEUKOCYTE ESTERASE UR QL STRIP.AUTO: ABNORMAL
LYMPHOCYTES # BLD AUTO: 2.58 X10*3/UL (ref 1.2–4.8)
LYMPHOCYTES NFR BLD AUTO: 31 %
MCH RBC QN AUTO: 32.4 PG (ref 26–34)
MCHC RBC AUTO-ENTMCNC: 35.2 G/DL (ref 32–36)
MCV RBC AUTO: 92 FL (ref 80–100)
METHADONE UR QL SCN: ABNORMAL
MONOCYTES # BLD AUTO: 0.77 X10*3/UL (ref 0.1–1)
MONOCYTES NFR BLD AUTO: 9.3 %
MUCOUS THREADS #/AREA URNS AUTO: ABNORMAL /LPF
NEUTROPHILS # BLD AUTO: 4.63 X10*3/UL (ref 1.2–7.7)
NEUTROPHILS NFR BLD AUTO: 55.7 %
NITRITE UR QL STRIP.AUTO: NEGATIVE
NRBC BLD-RTO: 0 /100 WBCS (ref 0–0)
OPIATES UR QL SCN: ABNORMAL
OXYCODONE+OXYMORPHONE UR QL SCN: ABNORMAL
PCP UR QL SCN: ABNORMAL
PH UR STRIP.AUTO: 5 [PH]
PLATELET # BLD AUTO: 177 X10*3/UL (ref 150–450)
POTASSIUM SERPL-SCNC: 3.5 MMOL/L (ref 3.5–5.3)
PROT SERPL-MCNC: 6 G/DL (ref 6.4–8.2)
PROT UR STRIP.AUTO-MCNC: NEGATIVE MG/DL
RBC # BLD AUTO: 3.61 X10*6/UL (ref 4.5–5.9)
RBC # UR STRIP.AUTO: ABNORMAL /UL
RBC #/AREA URNS AUTO: ABNORMAL /HPF
SALICYLATES SERPL-MCNC: <3 MG/DL
SODIUM SERPL-SCNC: 139 MMOL/L (ref 136–145)
SP GR UR STRIP.AUTO: 1.02
UROBILINOGEN UR STRIP.AUTO-MCNC: NORMAL MG/DL
WBC # BLD AUTO: 8.3 X10*3/UL (ref 4.4–11.3)
WBC #/AREA URNS AUTO: ABNORMAL /HPF

## 2024-10-06 PROCEDURE — 2500000004 HC RX 250 GENERAL PHARMACY W/ HCPCS (ALT 636 FOR OP/ED): Performed by: PHYSICIAN ASSISTANT

## 2024-10-06 PROCEDURE — 2500000001 HC RX 250 WO HCPCS SELF ADMINISTERED DRUGS (ALT 637 FOR MEDICARE OP): Performed by: PHYSICIAN ASSISTANT

## 2024-10-06 PROCEDURE — 93005 ELECTROCARDIOGRAM TRACING: CPT

## 2024-10-06 SDOH — HEALTH STABILITY: MENTAL HEALTH: BEHAVIORAL HEALTH(WDL): WITHIN DEFINED LIMITS

## 2024-10-06 SDOH — HEALTH STABILITY: MENTAL HEALTH: HAVE YOU WISHED YOU WERE DEAD OR WISHED YOU COULD GO TO SLEEP AND NOT WAKE UP?: NO

## 2024-10-06 SDOH — HEALTH STABILITY: MENTAL HEALTH: SUICIDE ASSESSMENT: ADULT (C-SSRS)

## 2024-10-06 SDOH — HEALTH STABILITY: MENTAL HEALTH: HAVE YOU EVER DONE ANYTHING, STARTED TO DO ANYTHING, OR PREPARED TO DO ANYTHING TO END YOUR LIFE?: NO

## 2024-10-06 SDOH — HEALTH STABILITY: MENTAL HEALTH: FOR HIGH RISK PATIENTS: ALL INTERVENTIONS ABOVE, PLUS:;1:1 PATIENT OBSERVER AT ALL TIMES

## 2024-10-06 SDOH — HEALTH STABILITY: MENTAL HEALTH: HAVE YOU ACTUALLY HAD ANY THOUGHTS OF KILLING YOURSELF?: NO

## 2024-10-06 ASSESSMENT — LIFESTYLE VARIABLES
HAVE YOU EVER FELT YOU SHOULD CUT DOWN ON YOUR DRINKING: YES
ORIENTATION AND CLOUDING OF SENSORIUM: ORIENTED AND CAN DO SERIAL ADDITIONS
HEADACHE, FULLNESS IN HEAD: NOT PRESENT
PAROXYSMAL SWEATS: NO SWEAT VISIBLE
PAROXYSMAL SWEATS: NO SWEAT VISIBLE
NAUSEA AND VOMITING: NO NAUSEA AND NO VOMITING
AUDITORY DISTURBANCES: NOT PRESENT
AGITATION: NORMAL ACTIVITY
TREMOR: NO TREMOR
VISUAL DISTURBANCES: NOT PRESENT
ANXIETY: MILDLY ANXIOUS
TOTAL SCORE: 1
BLOOD PRESSURE: 133/90
EVER FELT BAD OR GUILTY ABOUT YOUR DRINKING: YES
ORIENTATION AND CLOUDING OF SENSORIUM: ORIENTED AND CAN DO SERIAL ADDITIONS
TOTAL SCORE: 4
ANXIETY: 2
HEADACHE, FULLNESS IN HEAD: NOT PRESENT
EVER HAD A DRINK FIRST THING IN THE MORNING TO STEADY YOUR NERVES TO GET RID OF A HANGOVER: YES
AUDITORY DISTURBANCES: NOT PRESENT
VISUAL DISTURBANCES: NOT PRESENT
PULSE: 80
AGITATION: NORMAL ACTIVITY
PULSE: 81
TREMOR: NO TREMOR
HAVE PEOPLE ANNOYED YOU BY CRITICIZING YOUR DRINKING: YES
TOTAL SCORE: 2
NAUSEA AND VOMITING: NO NAUSEA AND NO VOMITING

## 2024-10-06 ASSESSMENT — COLUMBIA-SUICIDE SEVERITY RATING SCALE - C-SSRS
1. SINCE LAST CONTACT, HAVE YOU WISHED YOU WERE DEAD OR WISHED YOU COULD GO TO SLEEP AND NOT WAKE UP?: NO
2. HAVE YOU ACTUALLY HAD ANY THOUGHTS OF KILLING YOURSELF?: NO
6. HAVE YOU EVER DONE ANYTHING, STARTED TO DO ANYTHING, OR PREPARED TO DO ANYTHING TO END YOUR LIFE?: NO

## 2024-10-06 ASSESSMENT — ABNORMAL INVOLUNTARY MOVEMENT SCALE (AIMS)
LIPS_PARIETAL: NONE, NORMAL
JAW: NONE, NORMAL
NECK_SHOULDER_HIPS: NONE, NORMAL
AIMS_PATIENT_AWARENESS: NO AWARENESS
AIMS_PATIENT_INCAPACITATION: NONE, NORMAL
PATIENT_WEARS_DENTURES: NO
FACIAL_EXPRESSION_MUSCLES: NONE, NORMAL
TONGUE: NONE, NORMAL
CURRENT_PROBLEMS_TEETH_DENTURES: NO
UPPER_BODY_EXTREMITIES: NONE, NORMAL
LOWER_BODY_EXTREMITIES: NONE, NORMAL

## 2024-10-06 ASSESSMENT — PAIN SCALES - GENERAL
PAINLEVEL_OUTOF10: 0 - NO PAIN

## 2024-10-06 ASSESSMENT — PAIN - FUNCTIONAL ASSESSMENT: PAIN_FUNCTIONAL_ASSESSMENT: 0-10

## 2024-10-06 NOTE — ED NOTES
MS:  SR [id number]  (r) PAC, PVC, trip  2nd degree type I and II  .16/.12/.40    Report called to [facility] ED, handoff given to FRANCIS Jenkins.  Updated on vitals.     Lyric Pardo RN  10/06/24 1024

## 2024-10-06 NOTE — PROGRESS NOTES
"Social Work Note  Pt is a 64 y.o. male presenting to [facility] ED requesting detox from ETOH use. Prior to admission [facility] went to [facility] and they directed him to the ED for labs and medical clearance. Pt's BAL resulted at .050. Pt reports w/d sx of anxiety and also presents with a stutter. Pt states \"I'm a nervous wreck.\" Pt reports he has been drinking more lately, about 2 pints a day. Pt states he also recently switched from beer to hard liquor. Pt states he has been to [facility] twice the past 2 months. His last admission was on 9/15/24 and he remained sober for a week. He is requesting to go back. He feels he needs detox before he would consider long term rehab or residential. Pt reports he has been to rehab at [facility] and stayed at [facility] for a short time.  Pt denies hx of w/d seizures or DT's. Pt reports no other substance use except occasional THC when he is intoxicated. Pt did test positive for THC. Peer support was unavailable to see pt today.   Referral made to [facility].   10:06 Pt was accepted to [facility] by Dr Hubbard. Nursing report number given to nurse.   "

## 2024-10-06 NOTE — ED PROVIDER NOTES
HPI   Chief Complaint   Patient presents with    Alcohol Problem     Pt stated he was at an AA meeting and stated he wanted detox. Blanca nix told him to come here first. Pt stated his last drink was noon. Pt stated he had vodka and 2 cans of beer. Pt stated he does not take any street drugs. Pt is cooperative.       This is a 64-year-old male presenting to the emergency department requesting placement for inpatient alcohol detox.  Patient states that recently he has been drinking 2 pints of vodka daily.  His last drink was around 12 PM this afternoon.  Patient states that he went to an AA meeting and made decision to come to emergency department to hopefully get placed at [facility] for alcohol detox program.  The patient denies any suicidal or homicidal ideations.  He denies illicit drug use.      Please see HPI for pertinent positive and negative ROS.     Patient History   Past Medical History:   Diagnosis Date    Alcohol use     Depression     Hypertension      No past surgical history on file.  Family History   Problem Relation Name Age of Onset    Hypertension Other      Heart disease Other       Social History     Tobacco Use    Smoking status: Every Day     Types: Cigarettes    Smokeless tobacco: Not on file   Substance Use Topics    Alcohol use: Yes     Comment: 3 pints of vodka a day    Drug use: Yes     Types: Marijuana       Physical Exam   ED Triage Vitals [10/05/24 2226]   Temperature Heart Rate Respirations BP   36.9 °C (98.4 °F) 96 16 (!) 180/101      Pulse Ox Temp Source Heart Rate Source Patient Position   100 % Temporal -- --      BP Location FiO2 (%)     -- --       Physical Exam  GENERAL APPEARANCE: Awake and alert. No acute respiratory distress.   VITAL SIGNS: As per the nurses' triage record.  HEENT: Normocephalic, atraumatic.  No tongue fasciculations.  Mucous membranes are dry.  NECK: Soft, nontender and supple  CHEST: Nontender to palpation. Clear to auscultation bilaterally.   Symmetric rise and fall of chest wall.   HEART: Clear S1 and S2. Regular rate and rhythm.   Strong and equal pulses in the extremities.  ABDOMEN: Soft, nontender, nondistended  MUSCULOSKELETAL: The calves are nontender to palpation. Full gross active range of motion. Ambulating on own with no acute difficulties  NEUROLOGICAL: Awake, alert and oriented x 3. Motor power intact in the upper and lower extremities. Sensation is intact to light touch in the upper and lower extremities. Patient answering questions appropriately.   IMMUNOLOGICAL: No lymphatic streaking noted  DERMATOLOGIC: Warm and dry without petechiae, rashes, or ecchymosis noted on visible skin.   PYSCH: Cooperative with appropriate mood and affect.    ED Course & MDM   ED Course as of 10/08/24 1835   Sun Oct 06, 2024   0052 EKG obtained at 00 27 and read by me shows sinus rhythm, with an upright axis, 87 bpm, normal NH interval, normal QRS, QTc is 428, with no ST segment elevation, depression, or other acute injury pattern [AS]      ED Course User Index  [AS] Naomi Dowling DO         Diagnoses as of 10/08/24 1835   Alcohol dependence with uncomplicated intoxication (Multi)                 No data recorded     Benjamin Coma Scale Score: 15 (10/05/24 2358 : Chani Portillo RN)                           Medical Decision Making  Parts of this chart have been completed using voice recognition software. Please excuse any errors of transcription.  My thought process and reason for plan has been formulated from the time that I saw the patient until the time of disposition and is not specific to one specific moment during their visit and furthermore my MDM encompasses this entire chart and not only this text box.      HPI: Detailed above.    Exam: A medically appropriate exam performed, outlined above, given the known history and presentation.    History obtained from: Patient    EKG: See my supervising physician's EKG interpretation    Medications given during  visit:  Medications   folic acid (Folvite) tablet 1 mg (has no administration in time range)   multivitamin with minerals 1 tablet (has no administration in time range)   thiamine (Vitamin B-1) tablet 100 mg (has no administration in time range)   LORazepam (Ativan) injection 0.5 mg (has no administration in time range)     Or   LORazepam (Ativan) injection 1 mg (has no administration in time range)     Or   LORazepam (Ativan) injection 2 mg (has no administration in time range)   sodium chloride 0.9 % bolus 1,000 mL (1,000 mL intravenous New Bag 10/6/24 0023)        Diagnostic/tests  Labs Reviewed   CBC WITH AUTO DIFFERENTIAL - Abnormal       Result Value    WBC 8.3      nRBC 0.0      RBC 3.61 (*)     Hemoglobin 11.7 (*)     Hematocrit 33.2 (*)     MCV 92      MCH 32.4      MCHC 35.2      RDW 13.0      Platelets 177      Neutrophils % 55.7      Immature Granulocytes %, Automated 0.2      Lymphocytes % 31.0      Monocytes % 9.3      Eosinophils % 3.2      Basophils % 0.6      Neutrophils Absolute 4.63      Immature Granulocytes Absolute, Automated 0.02      Lymphocytes Absolute 2.58      Monocytes Absolute 0.77      Eosinophils Absolute 0.27      Basophils Absolute 0.05     COMPREHENSIVE METABOLIC PANEL - Abnormal    Glucose 92      Sodium 139      Potassium 3.5      Chloride 107      Bicarbonate 21      Anion Gap 15      Urea Nitrogen 15      Creatinine 0.68      eGFR >90      Calcium 8.1 (*)     Albumin 3.6      Alkaline Phosphatase 71      Total Protein 6.0 (*)     AST 17      Bilirubin, Total 0.3      ALT 17     DRUG SCREEN,URINE - Abnormal    Amphetamine Screen, Urine Presumptive Negative      Barbiturate Screen, Urine Presumptive Positive (*)     Benzodiazepines Screen, Urine Presumptive Negative      Cannabinoid Screen, Urine Presumptive Positive (*)     Cocaine Metabolite Screen, Urine Presumptive Negative      Fentanyl Screen, Urine Presumptive Negative      Opiate Screen, Urine Presumptive Negative       Oxycodone Screen, Urine Presumptive Negative      PCP Screen, Urine Presumptive Negative      Methadone Screen, Urine Presumptive Negative      Narrative:     Drug screen results are presumptive and should not be used to assess   compliance with prescribed medication. Contact the performing [facility] laboratory   to add-on definitive confirmatory testing if clinically indicated.    Toxicology screening results are reported qualitatively. The concentration must   be greater than or equal to the cutoff to be reported as positive. The concentration   at which the screening test can detect an individual drug or metabolite varies.   The absence of expected drug(s) and/or drug metabolite(s) may indicate non-compliance,   inappropriate timing of specimen collection relative to drug administration, poor drug   absorption, diluted/adulterated urine, or limitations of testing. For medical purposes   only; not valid for forensic use.    Interpretive questions should be directed to the laboratory medical directors.   ACUTE TOXICOLOGY PANEL, BLOOD - Abnormal    Acetaminophen <10.0      Salicylate  <3      Alcohol 51 (*)    URINALYSIS WITH REFLEX CULTURE AND MICROSCOPIC - Abnormal    Color, Urine Light-Yellow      Appearance, Urine Clear      Specific Gravity, Urine 1.018      pH, Urine 5.0      Protein, Urine NEGATIVE      Glucose, Urine Normal      Blood, Urine 0.06 (1+) (*)     Ketones, Urine NEGATIVE      Bilirubin, Urine NEGATIVE      Urobilinogen, Urine Normal      Nitrite, Urine NEGATIVE      Leukocyte Esterase, Urine 25 Rhonda/µL (*)    MICROSCOPIC ONLY, URINE - Abnormal    WBC, Urine 1-5      RBC, Urine 1-2      Bacteria, Urine 1+ (*)     Mucus, Urine FEW     URINE CULTURE   URINALYSIS WITH REFLEX CULTURE AND MICROSCOPIC    Narrative:     The following orders were created for panel order Urinalysis with Reflex Culture and Microscopic.  Procedure                               Abnormality         Status                      ---------                               -----------         ------                     Urinalysis with Reflex C...[[id number]]  Abnormal            Final result               Extra Urine Gray Tube[[id number]]                            In process                   Please view results for these tests on the individual orders.   EXTRA URINE GRAY TUBE      No orders to display        Considerations/further MDM:  Patient was seen in conjucntion with my supervising physician,  Dr. Dowling. Please refer to her note.    This is a 64-year-old male presenting to the emergency department requesting medical clearance for inpatient alcohol detox program.  Upon arrival, the patient is not appear to be in acute alcohol withdrawal.  He does not endorse any delusions or hallucinations.  Low suspicion for DTs.  CIWA protocol was initiated for nursing staff to complete and orders were placed in event acute withdrawal symptoms began.    Laboratory evaluation is grossly unremarkable.  Drug screen is positive for cannabinoid and barbiturates.  Alcohol level 51.  CBC shows mild normocytic anemia.  Mildly low calcium and total protein.  Urinalysis shows no evidence of urinary tract infection.  Patient was treated with IV normal saline 1 L.    At the end of my shift, the patient remains in the emergency department pending evaluation from .  Patient is medically cleared for inpatient alcohol detox based off of labs.  Please refer to my supervising physician for further management and disposition of this patient while he remains in the emergency department.  End of my shift is 0100 on 10/6/2024    Procedure  Procedures     Vane Davies PA-C  10/06/24 0047       Vane Davies PA-C  10/08/24 1835
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
## 2024-10-06 NOTE — ED PROVIDER NOTES
Patient was received in signout at 6:57 AM.     IN BRIEF   64-year-old male with a history of alcohol abuse presents requesting detox.  At the time of handoff he is medically cleared pending placement.       ED COURSE   Patient is accepted by [facility].  Transferred in stable condition.        FINAL IMPRESSION      1. Alcohol dependence with uncomplicated intoxication (Multi)          DISPOSITION    Transfer To Another Facility 10/06/2024 10:38:13 AM     Eleni Moncada DO  10/06/24 1239     abdominal pain/complains of pain/discomfort

## 2024-10-07 LAB — BACTERIA UR CULT: NORMAL

## 2024-10-08 LAB
ATRIAL RATE: 87 BPM
P AXIS: 68 DEGREES
P OFFSET: 177 MS
P ONSET: 128 MS
PR INTERVAL: 168 MS
Q ONSET: 212 MS
QRS COUNT: 14 BEATS
QRS DURATION: 92 MS
QT INTERVAL: 356 MS
QTC CALCULATION(BAZETT): 428 MS
QTC FREDERICIA: 402 MS
R AXIS: 78 DEGREES
T AXIS: 40 DEGREES
T OFFSET: 390 MS
VENTRICULAR RATE: 87 BPM

## 2024-10-17 ENCOUNTER — APPOINTMENT (OUTPATIENT)
Dept: CARDIOLOGY | Facility: HOSPITAL | Age: 64
End: 2024-10-17
Payer: COMMERCIAL

## 2024-10-17 ENCOUNTER — HOSPITAL ENCOUNTER (EMERGENCY)
Facility: HOSPITAL | Age: 64
Discharge: OTHER INSTITUTION | End: 2024-10-18
Attending: EMERGENCY MEDICINE
Payer: COMMERCIAL

## 2024-10-17 DIAGNOSIS — F10.10 ALCOHOL ABUSE: Primary | ICD-10-CM

## 2024-10-17 LAB
ALBUMIN SERPL BCP-MCNC: 3.9 G/DL (ref 3.4–5)
ALP SERPL-CCNC: 87 U/L (ref 33–136)
ALT SERPL W P-5'-P-CCNC: 17 U/L (ref 10–52)
AMPHETAMINES UR QL SCN: ABNORMAL
ANION GAP SERPL CALCULATED.3IONS-SCNC: 15 MMOL/L (ref 10–20)
APAP SERPL-MCNC: <10 UG/ML
APPEARANCE UR: CLEAR
AST SERPL W P-5'-P-CCNC: 24 U/L (ref 9–39)
BARBITURATES UR QL SCN: ABNORMAL
BASOPHILS # BLD AUTO: 0.06 X10*3/UL (ref 0–0.1)
BASOPHILS NFR BLD AUTO: 0.5 %
BENZODIAZ UR QL SCN: ABNORMAL
BILIRUB SERPL-MCNC: 0.5 MG/DL (ref 0–1.2)
BILIRUB UR STRIP.AUTO-MCNC: NEGATIVE MG/DL
BUN SERPL-MCNC: 13 MG/DL (ref 6–23)
BZE UR QL SCN: ABNORMAL
CALCIUM SERPL-MCNC: 8.8 MG/DL (ref 8.6–10.3)
CANNABINOIDS UR QL SCN: ABNORMAL
CHLORIDE SERPL-SCNC: 100 MMOL/L (ref 98–107)
CO2 SERPL-SCNC: 27 MMOL/L (ref 21–32)
COLOR UR: NORMAL
CREAT SERPL-MCNC: 0.71 MG/DL (ref 0.5–1.3)
EGFRCR SERPLBLD CKD-EPI 2021: >90 ML/MIN/1.73M*2
EOSINOPHIL # BLD AUTO: 0.09 X10*3/UL (ref 0–0.7)
EOSINOPHIL NFR BLD AUTO: 0.8 %
ERYTHROCYTE [DISTWIDTH] IN BLOOD BY AUTOMATED COUNT: 13 % (ref 11.5–14.5)
ETHANOL SERPL-MCNC: 197 MG/DL
FENTANYL+NORFENTANYL UR QL SCN: ABNORMAL
GLUCOSE SERPL-MCNC: 74 MG/DL (ref 74–99)
GLUCOSE UR STRIP.AUTO-MCNC: NORMAL MG/DL
HCT VFR BLD AUTO: 35.4 % (ref 41–52)
HGB BLD-MCNC: 12.4 G/DL (ref 13.5–17.5)
IMM GRANULOCYTES # BLD AUTO: 0.04 X10*3/UL (ref 0–0.7)
IMM GRANULOCYTES NFR BLD AUTO: 0.4 % (ref 0–0.9)
KETONES UR STRIP.AUTO-MCNC: NEGATIVE MG/DL
LEUKOCYTE ESTERASE UR QL STRIP.AUTO: NEGATIVE
LYMPHOCYTES # BLD AUTO: 3.26 X10*3/UL (ref 1.2–4.8)
LYMPHOCYTES NFR BLD AUTO: 29.8 %
MCH RBC QN AUTO: 32.2 PG (ref 26–34)
MCHC RBC AUTO-ENTMCNC: 35 G/DL (ref 32–36)
MCV RBC AUTO: 92 FL (ref 80–100)
METHADONE UR QL SCN: ABNORMAL
MONOCYTES # BLD AUTO: 0.6 X10*3/UL (ref 0.1–1)
MONOCYTES NFR BLD AUTO: 5.5 %
NEUTROPHILS # BLD AUTO: 6.9 X10*3/UL (ref 1.2–7.7)
NEUTROPHILS NFR BLD AUTO: 63 %
NITRITE UR QL STRIP.AUTO: NEGATIVE
NRBC BLD-RTO: 0 /100 WBCS (ref 0–0)
OPIATES UR QL SCN: ABNORMAL
OXYCODONE+OXYMORPHONE UR QL SCN: ABNORMAL
PCP UR QL SCN: ABNORMAL
PH UR STRIP.AUTO: 6 [PH]
PLATELET # BLD AUTO: 270 X10*3/UL (ref 150–450)
POTASSIUM SERPL-SCNC: 3.8 MMOL/L (ref 3.5–5.3)
PROT SERPL-MCNC: 6.7 G/DL (ref 6.4–8.2)
PROT UR STRIP.AUTO-MCNC: NEGATIVE MG/DL
RBC # BLD AUTO: 3.85 X10*6/UL (ref 4.5–5.9)
RBC # UR STRIP.AUTO: NEGATIVE /UL
RBC MORPH BLD: NORMAL
SALICYLATES SERPL-MCNC: <3 MG/DL
SODIUM SERPL-SCNC: 138 MMOL/L (ref 136–145)
SP GR UR STRIP.AUTO: 1.02
UROBILINOGEN UR STRIP.AUTO-MCNC: NORMAL MG/DL
WBC # BLD AUTO: 11 X10*3/UL (ref 4.4–11.3)

## 2024-10-17 PROCEDURE — 96374 THER/PROPH/DIAG INJ IV PUSH: CPT

## 2024-10-17 PROCEDURE — 80307 DRUG TEST PRSMV CHEM ANLYZR: CPT | Performed by: EMERGENCY MEDICINE

## 2024-10-17 PROCEDURE — 99284 EMERGENCY DEPT VISIT MOD MDM: CPT | Mod: 25

## 2024-10-17 PROCEDURE — 93005 ELECTROCARDIOGRAM TRACING: CPT

## 2024-10-17 PROCEDURE — 36415 COLL VENOUS BLD VENIPUNCTURE: CPT | Performed by: PHYSICIAN ASSISTANT

## 2024-10-17 PROCEDURE — 85025 COMPLETE CBC W/AUTO DIFF WBC: CPT | Performed by: PHYSICIAN ASSISTANT

## 2024-10-17 PROCEDURE — 2500000001 HC RX 250 WO HCPCS SELF ADMINISTERED DRUGS (ALT 637 FOR MEDICARE OP): Performed by: PHYSICIAN ASSISTANT

## 2024-10-17 PROCEDURE — 80053 COMPREHEN METABOLIC PANEL: CPT | Performed by: PHYSICIAN ASSISTANT

## 2024-10-17 PROCEDURE — 2500000004 HC RX 250 GENERAL PHARMACY W/ HCPCS (ALT 636 FOR OP/ED): Performed by: PHYSICIAN ASSISTANT

## 2024-10-17 PROCEDURE — 81003 URINALYSIS AUTO W/O SCOPE: CPT | Performed by: PHYSICIAN ASSISTANT

## 2024-10-17 PROCEDURE — 80320 DRUG SCREEN QUANTALCOHOLS: CPT | Performed by: PHYSICIAN ASSISTANT

## 2024-10-17 PROCEDURE — 99285 EMERGENCY DEPT VISIT HI MDM: CPT | Mod: 25

## 2024-10-17 RX ORDER — LANOLIN ALCOHOL/MO/W.PET/CERES
100 CREAM (GRAM) TOPICAL DAILY
Status: DISCONTINUED | OUTPATIENT
Start: 2024-10-17 | End: 2024-10-18 | Stop reason: HOSPADM

## 2024-10-17 RX ORDER — FOLIC ACID 1 MG/1
1 TABLET ORAL DAILY
Status: DISCONTINUED | OUTPATIENT
Start: 2024-10-17 | End: 2024-10-18 | Stop reason: HOSPADM

## 2024-10-17 RX ORDER — MULTIVIT-MIN/IRON FUM/FOLIC AC 7.5 MG-4
1 TABLET ORAL DAILY
Status: DISCONTINUED | OUTPATIENT
Start: 2024-10-17 | End: 2024-10-18 | Stop reason: HOSPADM

## 2024-10-17 RX ORDER — ONDANSETRON HYDROCHLORIDE 2 MG/ML
4 INJECTION, SOLUTION INTRAVENOUS ONCE
Status: COMPLETED | OUTPATIENT
Start: 2024-10-17 | End: 2024-10-17

## 2024-10-17 ASSESSMENT — LIFESTYLE VARIABLES
HEADACHE, FULLNESS IN HEAD: NOT PRESENT
VISUAL DISTURBANCES: NOT PRESENT
ANXIETY: NO ANXIETY, AT EASE
EVER FELT BAD OR GUILTY ABOUT YOUR DRINKING: YES
TOTAL SCORE: 4
AUDITORY DISTURBANCES: NOT PRESENT
PAROXYSMAL SWEATS: NO SWEAT VISIBLE
ORIENTATION AND CLOUDING OF SENSORIUM: CANNOT DO SERIAL ADDITIONS OR IS UNCERTAIN ABOUT DATE
AGITATION: SOMEWHAT MORE THAN NORMAL ACTIVITY
AUDITORY DISTURBANCES: NOT PRESENT
TOTAL SCORE: 2
HAVE YOU EVER FELT YOU SHOULD CUT DOWN ON YOUR DRINKING: YES
TREMOR: NO TREMOR
HAVE PEOPLE ANNOYED YOU BY CRITICIZING YOUR DRINKING: YES
PAROXYSMAL SWEATS: NO SWEAT VISIBLE
AGITATION: NORMAL ACTIVITY
ANXIETY: NO ANXIETY, AT EASE
TOTAL SCORE: 1
EVER HAD A DRINK FIRST THING IN THE MORNING TO STEADY YOUR NERVES TO GET RID OF A HANGOVER: YES
VISUAL DISTURBANCES: NOT PRESENT
ORIENTATION AND CLOUDING OF SENSORIUM: ORIENTED AND CAN DO SERIAL ADDITIONS
NAUSEA AND VOMITING: NO NAUSEA AND NO VOMITING
HEADACHE, FULLNESS IN HEAD: NOT PRESENT
TREMOR: NO TREMOR
NAUSEA AND VOMITING: MILD NAUSEA WITH NO VOMITING

## 2024-10-17 ASSESSMENT — PAIN SCALES - GENERAL: PAINLEVEL_OUTOF10: 0 - NO PAIN

## 2024-10-17 NOTE — PROGRESS NOTES
Social Work Note  Peer support attempted to meet with patient. Patient unable to participate due to intoxication/lethargy. Peer support and SW to reattempt once patient is more alert and able to participate in assessment.

## 2024-10-17 NOTE — ED NOTES
Patient here for detox.  Pt last drink was 1 hour ago. Patient drink 203 pints of alcohol a day, patient denies si and hi     PATIENT FALLING ASLEEP FREQUENTLY, CLEARLY INTOXICATED, UNABLE TO PROVIDE URINE SAMPLE CURRENTLY, WILL GIVE PO MEDS WHEN ABLE.  CIWA=2.    PMHX:  Past Medical History:   Diagnosis Date    Alcohol use     Depression     Hypertension         No Known Allergies      LABS:    Latest Reference Range & Units 10/17/24 15:52   GLUCOSE 74 - 99 mg/dL 74   SODIUM 136 - 145 mmol/L 138   POTASSIUM 3.5 - 5.3 mmol/L 3.8   CHLORIDE 98 - 107 mmol/L 100   Bicarbonate 21 - 32 mmol/L 27   Anion Gap 10 - 20 mmol/L 15   Blood Urea Nitrogen 6 - 23 mg/dL 13   Creatinine 0.50 - 1.30 mg/dL 0.71   EGFR >60 mL/min/1.73m*2 >90   Calcium 8.6 - 10.3 mg/dL 8.8   Albumin 3.4 - 5.0 g/dL 3.9   Alkaline Phosphatase 33 - 136 U/L 87   ALT 10 - 52 U/L 17   AST 9 - 39 U/L 24   Bilirubin Total 0.0 - 1.2 mg/dL 0.5   Total Protein 6.4 - 8.2 g/dL 6.7   WBC 4.4 - 11.3 x10*3/uL 11.0   nRBC 0.0 - 0.0 /100 WBCs 0.0   RBC 4.50 - 5.90 x10*6/uL 3.85 (L)   HEMOGLOBIN 13.5 - 17.5 g/dL 12.4 (L)   HEMATOCRIT 41.0 - 52.0 % 35.4 (L)   MCV 80 - 100 fL 92   MCH 26.0 - 34.0 pg 32.2   MCHC 32.0 - 36.0 g/dL 35.0   RED CELL DISTRIBUTION WIDTH 11.5 - 14.5 % 13.0   Platelets 150 - 450 x10*3/uL 270   (L): Data is abnormally low   Latest Reference Range & Units 10/17/24 15:52   Acetaminophen 10.0 - 30.0 ug/mL <10.0   Salicylate  4 - 20 mg/dL <3   Alcohol <=10 mg/dL 197 (H)   (H): Data is abnormally high        PLAN: NERISSA Mills RN  10/17/24 4454       Madina Mills RN  10/17/24 0237

## 2024-10-17 NOTE — ED PROVIDER NOTES
HPI   Chief Complaint   Patient presents with    detox     Patient here for detox.  Pt last drink was 1 hour ago. Patient drink 203 pints of alcohol a day, patient denies si and hi        HPI    Patient is a 64-year-old male with a history of alcohol abuse presenting to the emergency department seeking inpatient detoxification therapy.  Patient states that he drinks 2 to 3 pints of vodka every day.  He states he feels that he has been drinking more lately.  His last drink was approximately 1 hour prior to arrival.  Patient states that he is ashamed of his self and is seeking help.  He denies suicidal ideation or homicidal ideation.  No hallucinations or delusions.  No tremors.  No diaphoresis.  No somatic complaints, including no chest pain, shortness of breath abdominal pain, nausea, vomiting, diarrhea or constipation.  No dysuria or hematuria.  Patient admits to marijuana use several days ago but denies regular illicit drug use.    Patient History   Past Medical History:   Diagnosis Date    Alcohol use     Depression     Hypertension      History reviewed. No pertinent surgical history.  Family History   Problem Relation Name Age of Onset    Hypertension Other      Heart disease Other       Social History     Tobacco Use    Smoking status: Every Day     Types: Cigarettes    Smokeless tobacco: Not on file   Substance Use Topics    Alcohol use: Yes     Comment: 3 pints of vodka a day    Drug use: Yes     Types: Marijuana       Physical Exam   ED Triage Vitals [10/17/24 1504]   Temperature Heart Rate Respirations BP   36.1 °C (97 °F) (!) 103 18 (!) 158/92      Pulse Ox Temp Source Heart Rate Source Patient Position   96 % Temporal Monitor Sitting      BP Location FiO2 (%)     Right arm --       Physical Exam  Vitals and nursing note reviewed.   Constitutional:       Comments: Disheveled appearance, smells of urine   HENT:      Head: Normocephalic and atraumatic.   Eyes:      Extraocular Movements: Extraocular  movements intact.      Conjunctiva/sclera: Conjunctivae normal.      Pupils: Pupils are equal, round, and reactive to light.   Cardiovascular:      Rate and Rhythm: Normal rate and regular rhythm.   Pulmonary:      Effort: Pulmonary effort is normal.      Breath sounds: Normal breath sounds.   Abdominal:      General: Abdomen is flat. Bowel sounds are normal.      Palpations: Abdomen is soft.   Skin:     General: Skin is warm and dry.   Neurological:      Mental Status: He is alert.           ED Course & [facility]   ED Course as of 10/18/24 1504   u Oct 17, 2024   1939 EKG personally interpreted by me performed at 1554  Normal sinus rhythm with ventricular rate 85 normal axis and intervals no acute ischemic changes [EF]   2304 Acute Toxicology Panel, Blood(!)  Significantly above the legal limit.  Will likely be below the legal limit in 5 hours from initial draw [EG]   2305 CBC and Auto Differential(!)  Not consistent with SIRS criteria, anemia just below the normal limits that is not macrocytic, no thrombocytopenia [EG]   2305 Comprehensive Metabolic Panel  Noncontributory and specifically no acute kidney injury or hepatitis or elevated bilirubin [EG]   Fri Oct 18, 2024   0000 Drug Screen, Urine(!)  For barbiturates and cannabis [EG]   0001 Urinalysis with Reflex Culture and Microscopic  Not consistent with ketones, dehydration or urinary tract infection [EG]      ED Course User Index  [EF] Eleni Moncada DO  [EG] Gini Mendes MD         Diagnoses as of 10/18/24 1504   Alcohol abuse                 No data recorded     [place] Coma Scale Score: 15 (10/17/24 1504 : Yeimy Vu RN)                           Medical Decision Making    Parts of this chart have been completed using voice recognition software. Please excuse any errors of transcription. Despite the medical decision making time stamp above-my medical decision making has taken place during the patient's entire visit. My thought process and  reason for plan has been formulated from the time that I saw the patient until the time of disposition and is not specific to one specific moment during their visit and furthermore my MDM encompasses this entire chart and not only this text box.      HPI: Detailed above.    Exam: A medically appropriate exam performed, outlined above, given the known history and presentation.    History obtained from: Patient    Social Determinants of Health considered during this visit: Alcohol abuse    EKG interpreted by my attending physician, reviewed by myself.    Labs Reviewed   CBC WITH AUTO DIFFERENTIAL   COMPREHENSIVE METABOLIC PANEL   DRUG SCREEN,URINE   ACUTE TOXICOLOGY PANEL, BLOOD     No orders to display     Medications   folic acid (Folvite) tablet 1 mg (has no administration in time range)   multivitamin with minerals 1 tablet (has no administration in time range)   thiamine (Vitamin B-1) tablet 100 mg (has no administration in time range)     Differential diagnoses considered for this visit include: Call intoxication versus drug intoxication versus electrolyte imbalance versus metabolic disturbance    Considerations/further MDM:    Patient is a 64-year-old male with a history of alcohol abuse presenting to the emergency department seeking inpatient detoxification therapy.  Vital signs are hemodynamically stable within normal limits.  CBC and CMP were within normal limits.  Alcohol was positive at 197.  Urinalysis was negative for infection.  Patient is considered medically cleared at this time for inpatient detoxification therapy.    Patient was handed off to my attending physician on my departure from the emergency department.  See supervisory note for further detail and disposition.    Patient was seen in conjunction with attending physician Dr. Eleni Moncada.   Patient's history, physical exam, diagnostic studies, and treatment plan were discussed thoroughly.  Procedure  Procedures     Maribel Mitchell,  RICKEY  10/18/24 1505

## 2024-10-18 VITALS
TEMPERATURE: 97 F | SYSTOLIC BLOOD PRESSURE: 134 MMHG | WEIGHT: 175 LBS | HEART RATE: 80 BPM | DIASTOLIC BLOOD PRESSURE: 69 MMHG | OXYGEN SATURATION: 94 % | BODY MASS INDEX: 23.7 KG/M2 | RESPIRATION RATE: 14 BRPM | HEIGHT: 72 IN

## 2024-10-18 LAB
ATRIAL RATE: 85 BPM
HOLD SPECIMEN: NORMAL
P AXIS: 79 DEGREES
P OFFSET: 195 MS
P ONSET: 139 MS
PR INTERVAL: 172 MS
Q ONSET: 225 MS
QRS COUNT: 14 BEATS
QRS DURATION: 92 MS
QT INTERVAL: 352 MS
QTC CALCULATION(BAZETT): 418 MS
QTC FREDERICIA: 395 MS
R AXIS: 74 DEGREES
T AXIS: 64 DEGREES
T OFFSET: 401 MS
VENTRICULAR RATE: 85 BPM

## 2024-10-18 ASSESSMENT — LIFESTYLE VARIABLES
BLOOD PRESSURE: 136/78
VISUAL DISTURBANCES: NOT PRESENT
AUDITORY DISTURBANCES: NOT PRESENT
ORIENTATION AND CLOUDING OF SENSORIUM: ORIENTED AND CAN DO SERIAL ADDITIONS
ANXIETY: NO ANXIETY, AT EASE
PAROXYSMAL SWEATS: NO SWEAT VISIBLE
TOTAL SCORE: 0
HEADACHE, FULLNESS IN HEAD: NOT PRESENT
HEADACHE, FULLNESS IN HEAD: NOT PRESENT
ORIENTATION AND CLOUDING OF SENSORIUM: ORIENTED AND CAN DO SERIAL ADDITIONS
AUDITORY DISTURBANCES: NOT PRESENT
PAROXYSMAL SWEATS: NO SWEAT VISIBLE
AUDITORY DISTURBANCES: NOT PRESENT
TREMOR: NO TREMOR
AGITATION: NORMAL ACTIVITY
ORIENTATION AND CLOUDING OF SENSORIUM: ORIENTED AND CAN DO SERIAL ADDITIONS
AGITATION: NORMAL ACTIVITY
AGITATION: NORMAL ACTIVITY
PULSE: 79
TREMOR: NO TREMOR
HEADACHE, FULLNESS IN HEAD: NOT PRESENT
BLOOD PRESSURE: 145/81
VISUAL DISTURBANCES: NOT PRESENT
BLOOD PRESSURE: 134/69
VISUAL DISTURBANCES: NOT PRESENT
PULSE: 84
NAUSEA AND VOMITING: NO NAUSEA AND NO VOMITING
PULSE: 80
TREMOR: NO TREMOR
ANXIETY: NO ANXIETY, AT EASE
TOTAL SCORE: 0
NAUSEA AND VOMITING: NO NAUSEA AND NO VOMITING
PAROXYSMAL SWEATS: NO SWEAT VISIBLE
NAUSEA AND VOMITING: NO NAUSEA AND NO VOMITING

## 2024-10-18 ASSESSMENT — PAIN SCALES - GENERAL
PAINLEVEL_OUTOF10: 0 - NO PAIN

## 2024-10-18 ASSESSMENT — PAIN - FUNCTIONAL ASSESSMENT: PAIN_FUNCTIONAL_ASSESSMENT: 0-10

## 2024-10-18 NOTE — ED PROVIDER NOTES
Care of the patient signed out to me by the overnight physician, he is a 64-year-old male who was signed out to me pending admission to [facility] for alcohol detox.  He cannot go over until after 8 AM.  He is stable on CIWA protocol, has been accepted, and is pending transfer this morning.  He remained stable during my shift and was transferred in stable condition.     Saturnino Worthington, DO  10/18/24 8735

## 2024-10-18 NOTE — ED PROVIDER NOTES
Handoff HPI:    64-year-old male with a history of alcohol abuse presenting to the emergency department seeking inpatient detoxification therapy. Patient states that he drinks 2 to 3 pints of vodka every day. He states he feels that he has been drinking more lately. His last drink was approximately 1 hour prior to arrival. Patient states that he is ashamed of his self and is seeking help. He denies suicidal ideation or homicidal ideation. No hallucinations or delusions.     Physical Exam  Vitals and nursing note reviewed.   Constitutional:       Comments: Disheveled appearance, smells of urine   HENT:      Head: Normocephalic and atraumatic.   Eyes:      Extraocular Movements: Extraocular movements intact.      Conjunctiva/sclera: Conjunctivae normal.      Pupils: Pupils are equal, round, and reactive to light.   Cardiovascular:      Rate and Rhythm: Normal rate and regular rhythm.   Pulmonary:      Effort: Pulmonary effort is normal.      Breath sounds: Normal breath sounds.   Abdominal:      General: Abdomen is flat. Bowel sounds are normal.      Palpations: Abdomen is soft.   Skin:     General: Skin is warm and dry.   Neurological:      Mental Status: He is alert.     ED Course as of 10/18/24 0842   Thu Oct 17, 2024   1939 EKG personally interpreted by me performed at 1554  Normal sinus rhythm with ventricular rate 85 normal axis and intervals no acute ischemic changes [EF]   2304 Acute Toxicology Panel, Blood(!)  Significantly above the legal limit.  Will likely be below the legal limit in 5 hours from initial draw [EG]   2305 CBC and Auto Differential(!)  Not consistent with SIRS criteria, anemia just below the normal limits that is not macrocytic, no thrombocytopenia [EG]   2305 Comprehensive Metabolic Panel  Noncontributory and specifically no acute kidney injury or hepatitis or elevated bilirubin [EG]   Fri Oct 18, 2024   0000 Drug Screen, Urine(!)  For barbiturates and cannabis [EG]   0001 Urinalysis with  Reflex Culture and Microscopic  Not consistent with ketones, dehydration or urinary tract infection [EG]      ED Course User Index  [EF] Eleni Moncada,   [EG] Gini Mendes MD         Diagnoses as of 10/18/24 0842   Alcohol abuse     Patient had no events overnight and has been calm and cooperative on a CIWA protocol.  He is medically cleared for transfer to a psychiatric institution.  Care is handed off to the day team with Dr. Elin Mendes MD  10/18/24 0626       Gini Mendes MD  10/18/24 0842

## 2024-10-18 NOTE — PROGRESS NOTES
Social Work Note    Pt was accepted to [facility] under Dr Hubbard on the 1600 unit. Dispo given to ED. [facility] is requesting pt not be transferred until 0800 due to bed availability.

## 2024-10-18 NOTE — PROGRESS NOTES
"Patient 64 year old male presenting in ED for alcohol intoxication.  Patient oriented X2.  Patient was aware of place and self but was disoriented to time. Patient calm and cooperative. Patient presented intoxicated, slurring  words, and dressed appropriately.   Patient reported drinking 2-3 pints of vodka per day.  Patient reported that a few months ago he recently started drinking more due to his depressive symptoms.  Patient stated \"I drunk a few pints of vodka before coming to the ED today\". Patient reported smoking marijuana a few days ago due to depressive and anxiety  symptoms increasing. Patient stated \"I usually don't smoke marijuana\". Patient denied any suicidal or homicidal ideation. Patient denied any auditory or visual hallucinations.   Patient reported past inpatient detox history at [facility] and [facility].   Patient reported current withdrawal symptoms as nausea and leg shaking.   Patient denied any history of severe withdrawal symptoms, such as seizers and DT's.  Patient receives outpatient services at [facility] for Psychiatry.  Patient report that he is compliant with his  anti depressants.   Patient reported a history of struggling with depression and anxiety.     Per chart review patient blood alcohol level is 197.    Patient being recommended for inpatient treatment for detox and would like to go to [facility].     "

## 2024-10-19 ENCOUNTER — HOSPITAL ENCOUNTER (EMERGENCY)
Facility: HOSPITAL | Age: 64
Discharge: OTHER INSTITUTION | End: 2024-10-20
Attending: EMERGENCY MEDICINE
Payer: COMMERCIAL

## 2024-10-19 ENCOUNTER — APPOINTMENT (OUTPATIENT)
Dept: CARDIOLOGY | Facility: HOSPITAL | Age: 64
End: 2024-10-19
Payer: COMMERCIAL

## 2024-10-19 DIAGNOSIS — F10.10 ALCOHOL ABUSE: Primary | ICD-10-CM

## 2024-10-19 LAB
ALBUMIN SERPL BCP-MCNC: 4.3 G/DL (ref 3.4–5)
ALP SERPL-CCNC: 86 U/L (ref 33–136)
ALT SERPL W P-5'-P-CCNC: 19 U/L (ref 10–52)
ANION GAP SERPL CALCULATED.3IONS-SCNC: 13 MMOL/L (ref 10–20)
AST SERPL W P-5'-P-CCNC: 32 U/L (ref 9–39)
BASOPHILS # BLD AUTO: 0.06 X10*3/UL (ref 0–0.1)
BASOPHILS NFR BLD AUTO: 0.7 %
BILIRUB SERPL-MCNC: 0.3 MG/DL (ref 0–1.2)
BUN SERPL-MCNC: 12 MG/DL (ref 6–23)
CALCIUM SERPL-MCNC: 8.7 MG/DL (ref 8.6–10.3)
CHLORIDE SERPL-SCNC: 102 MMOL/L (ref 98–107)
CO2 SERPL-SCNC: 28 MMOL/L (ref 21–32)
CREAT SERPL-MCNC: 0.73 MG/DL (ref 0.5–1.3)
EGFRCR SERPLBLD CKD-EPI 2021: >90 ML/MIN/1.73M*2
EOSINOPHIL # BLD AUTO: 0.2 X10*3/UL (ref 0–0.7)
EOSINOPHIL NFR BLD AUTO: 2.2 %
ERYTHROCYTE [DISTWIDTH] IN BLOOD BY AUTOMATED COUNT: 13 % (ref 11.5–14.5)
ETHANOL SERPL-MCNC: 253 MG/DL
GLUCOSE SERPL-MCNC: 118 MG/DL (ref 74–99)
HCT VFR BLD AUTO: 35.4 % (ref 41–52)
HGB BLD-MCNC: 12.4 G/DL (ref 13.5–17.5)
IMM GRANULOCYTES # BLD AUTO: 0.03 X10*3/UL (ref 0–0.7)
IMM GRANULOCYTES NFR BLD AUTO: 0.3 % (ref 0–0.9)
LYMPHOCYTES # BLD AUTO: 2.81 X10*3/UL (ref 1.2–4.8)
LYMPHOCYTES NFR BLD AUTO: 31.3 %
MCH RBC QN AUTO: 32.4 PG (ref 26–34)
MCHC RBC AUTO-ENTMCNC: 35 G/DL (ref 32–36)
MCV RBC AUTO: 92 FL (ref 80–100)
MONOCYTES # BLD AUTO: 0.44 X10*3/UL (ref 0.1–1)
MONOCYTES NFR BLD AUTO: 4.9 %
NEUTROPHILS # BLD AUTO: 5.44 X10*3/UL (ref 1.2–7.7)
NEUTROPHILS NFR BLD AUTO: 60.6 %
NRBC BLD-RTO: 0 /100 WBCS (ref 0–0)
PLATELET # BLD AUTO: 247 X10*3/UL (ref 150–450)
POTASSIUM SERPL-SCNC: 4 MMOL/L (ref 3.5–5.3)
PROT SERPL-MCNC: 7 G/DL (ref 6.4–8.2)
RBC # BLD AUTO: 3.83 X10*6/UL (ref 4.5–5.9)
SODIUM SERPL-SCNC: 139 MMOL/L (ref 136–145)
WBC # BLD AUTO: 9 X10*3/UL (ref 4.4–11.3)

## 2024-10-19 PROCEDURE — 36415 COLL VENOUS BLD VENIPUNCTURE: CPT | Performed by: EMERGENCY MEDICINE

## 2024-10-19 PROCEDURE — 85025 COMPLETE CBC W/AUTO DIFF WBC: CPT | Performed by: EMERGENCY MEDICINE

## 2024-10-19 PROCEDURE — 93005 ELECTROCARDIOGRAM TRACING: CPT

## 2024-10-19 PROCEDURE — 82077 ASSAY SPEC XCP UR&BREATH IA: CPT | Performed by: EMERGENCY MEDICINE

## 2024-10-19 PROCEDURE — 84075 ASSAY ALKALINE PHOSPHATASE: CPT | Performed by: EMERGENCY MEDICINE

## 2024-10-19 PROCEDURE — 99285 EMERGENCY DEPT VISIT HI MDM: CPT

## 2024-10-19 ASSESSMENT — LIFESTYLE VARIABLES
TOTAL SCORE: 4
HAVE YOU EVER FELT YOU SHOULD CUT DOWN ON YOUR DRINKING: YES
EVER HAD A DRINK FIRST THING IN THE MORNING TO STEADY YOUR NERVES TO GET RID OF A HANGOVER: YES
HAVE PEOPLE ANNOYED YOU BY CRITICIZING YOUR DRINKING: YES
EVER FELT BAD OR GUILTY ABOUT YOUR DRINKING: YES

## 2024-10-19 ASSESSMENT — PAIN SCALES - GENERAL: PAINLEVEL_OUTOF10: 0 - NO PAIN

## 2024-10-19 ASSESSMENT — PAIN - FUNCTIONAL ASSESSMENT: PAIN_FUNCTIONAL_ASSESSMENT: 0-10

## 2024-10-19 ASSESSMENT — PAIN DESCRIPTION - PROGRESSION: CLINICAL_PROGRESSION: NOT CHANGED

## 2024-10-20 ENCOUNTER — HOSPITAL ENCOUNTER (EMERGENCY)
Facility: HOSPITAL | Age: 64
Discharge: HOME | End: 2024-10-20
Attending: STUDENT IN AN ORGANIZED HEALTH CARE EDUCATION/TRAINING PROGRAM
Payer: COMMERCIAL

## 2024-10-20 VITALS
OXYGEN SATURATION: 99 % | WEIGHT: 180 LBS | TEMPERATURE: 98.2 F | HEIGHT: 72 IN | RESPIRATION RATE: 16 BRPM | BODY MASS INDEX: 24.38 KG/M2 | DIASTOLIC BLOOD PRESSURE: 96 MMHG | HEART RATE: 77 BPM | SYSTOLIC BLOOD PRESSURE: 160 MMHG

## 2024-10-20 VITALS
BODY MASS INDEX: 23.7 KG/M2 | HEIGHT: 72 IN | RESPIRATION RATE: 16 BRPM | OXYGEN SATURATION: 97 % | TEMPERATURE: 97 F | WEIGHT: 175 LBS | HEART RATE: 88 BPM | DIASTOLIC BLOOD PRESSURE: 67 MMHG | SYSTOLIC BLOOD PRESSURE: 109 MMHG

## 2024-10-20 DIAGNOSIS — T63.441D BEE STING REACTION, ACCIDENTAL OR UNINTENTIONAL, SUBSEQUENT ENCOUNTER: Primary | ICD-10-CM

## 2024-10-20 DIAGNOSIS — R22.31 LOCALIZED SWELLING ON RIGHT HAND: ICD-10-CM

## 2024-10-20 PROBLEM — T63.441A BEE STING REACTION: Status: ACTIVE | Noted: 2024-10-20

## 2024-10-20 LAB
AMPHETAMINES UR QL SCN: NORMAL
APPEARANCE UR: CLEAR
BARBITURATES UR QL SCN: NORMAL
BENZODIAZ UR QL SCN: NORMAL
BILIRUB UR STRIP.AUTO-MCNC: NEGATIVE MG/DL
BZE UR QL SCN: NORMAL
CANNABINOIDS UR QL SCN: NORMAL
COLOR UR: ABNORMAL
FENTANYL+NORFENTANYL UR QL SCN: NORMAL
GLUCOSE UR STRIP.AUTO-MCNC: NORMAL MG/DL
HOLD SPECIMEN: NORMAL
HYALINE CASTS #/AREA URNS AUTO: ABNORMAL /LPF
KETONES UR STRIP.AUTO-MCNC: NEGATIVE MG/DL
LEUKOCYTE ESTERASE UR QL STRIP.AUTO: ABNORMAL
METHADONE UR QL SCN: NORMAL
MUCOUS THREADS #/AREA URNS AUTO: ABNORMAL /LPF
NITRITE UR QL STRIP.AUTO: NEGATIVE
OPIATES UR QL SCN: NORMAL
OXYCODONE+OXYMORPHONE UR QL SCN: NORMAL
PCP UR QL SCN: NORMAL
PH UR STRIP.AUTO: 5 [PH]
PROT UR STRIP.AUTO-MCNC: NEGATIVE MG/DL
RBC # UR STRIP.AUTO: NEGATIVE /UL
RBC #/AREA URNS AUTO: ABNORMAL /HPF
SP GR UR STRIP.AUTO: 1.02
UROBILINOGEN UR STRIP.AUTO-MCNC: NORMAL MG/DL
WBC #/AREA URNS AUTO: ABNORMAL /HPF

## 2024-10-20 PROCEDURE — 99283 EMERGENCY DEPT VISIT LOW MDM: CPT

## 2024-10-20 PROCEDURE — 2500000001 HC RX 250 WO HCPCS SELF ADMINISTERED DRUGS (ALT 637 FOR MEDICARE OP): Performed by: CLINICAL NURSE SPECIALIST

## 2024-10-20 PROCEDURE — 2500000001 HC RX 250 WO HCPCS SELF ADMINISTERED DRUGS (ALT 637 FOR MEDICARE OP): Performed by: EMERGENCY MEDICINE

## 2024-10-20 PROCEDURE — 80307 DRUG TEST PRSMV CHEM ANLYZR: CPT | Performed by: EMERGENCY MEDICINE

## 2024-10-20 PROCEDURE — 2500000004 HC RX 250 GENERAL PHARMACY W/ HCPCS (ALT 636 FOR OP/ED): Performed by: CLINICAL NURSE SPECIALIST

## 2024-10-20 PROCEDURE — 87086 URINE CULTURE/COLONY COUNT: CPT | Mod: WESLAB | Performed by: EMERGENCY MEDICINE

## 2024-10-20 PROCEDURE — 81003 URINALYSIS AUTO W/O SCOPE: CPT | Performed by: EMERGENCY MEDICINE

## 2024-10-20 RX ORDER — LORATADINE 10 MG/1
10 TABLET ORAL DAILY
Qty: 7 TABLET | Refills: 0 | Status: SHIPPED | OUTPATIENT
Start: 2024-10-20 | End: 2024-10-27

## 2024-10-20 RX ORDER — MUPIROCIN 20 MG/G
OINTMENT TOPICAL
Qty: 15 G | Refills: 0 | Status: SHIPPED | OUTPATIENT
Start: 2024-10-20 | End: 2024-10-27

## 2024-10-20 RX ORDER — METHYLPREDNISOLONE 4 MG/1
TABLET ORAL
Qty: 21 TABLET | Refills: 0 | Status: SHIPPED | OUTPATIENT
Start: 2024-10-20 | End: 2024-10-27

## 2024-10-20 RX ORDER — FAMOTIDINE 20 MG/1
20 TABLET, FILM COATED ORAL ONCE
Status: COMPLETED | OUTPATIENT
Start: 2024-10-20 | End: 2024-10-20

## 2024-10-20 RX ORDER — BACITRACIN 500 [USP'U]/G
OINTMENT TOPICAL ONCE
Status: COMPLETED | OUTPATIENT
Start: 2024-10-20 | End: 2024-10-20

## 2024-10-20 RX ORDER — FAMOTIDINE 20 MG/1
20 TABLET, FILM COATED ORAL 2 TIMES DAILY
Qty: 14 TABLET | Refills: 0 | Status: SHIPPED | OUTPATIENT
Start: 2024-10-20 | End: 2024-10-27

## 2024-10-20 RX ORDER — DIPHENHYDRAMINE HCL 25 MG
25 TABLET ORAL ONCE
Status: COMPLETED | OUTPATIENT
Start: 2024-10-20 | End: 2024-10-20

## 2024-10-20 RX ORDER — CETIRIZINE HYDROCHLORIDE 10 MG/1
10 TABLET ORAL ONCE
Status: COMPLETED | OUTPATIENT
Start: 2024-10-20 | End: 2024-10-20

## 2024-10-20 RX ORDER — PREDNISONE 20 MG/1
60 TABLET ORAL ONCE
Status: COMPLETED | OUTPATIENT
Start: 2024-10-20 | End: 2024-10-20

## 2024-10-20 ASSESSMENT — LIFESTYLE VARIABLES
AUDITORY DISTURBANCES: NOT PRESENT
ANXIETY: NO ANXIETY, AT EASE
AUDITORY DISTURBANCES: NOT PRESENT
TOTAL SCORE: 1
VISUAL DISTURBANCES: NOT PRESENT
HEADACHE, FULLNESS IN HEAD: NOT PRESENT
NAUSEA AND VOMITING: MILD NAUSEA WITH NO VOMITING
PAROXYSMAL SWEATS: NO SWEAT VISIBLE
AGITATION: NORMAL ACTIVITY
PAROXYSMAL SWEATS: NO SWEAT VISIBLE
ORIENTATION AND CLOUDING OF SENSORIUM: ORIENTED AND CAN DO SERIAL ADDITIONS
ORIENTATION AND CLOUDING OF SENSORIUM: ORIENTED AND CAN DO SERIAL ADDITIONS
VISUAL DISTURBANCES: NOT PRESENT
TREMOR: NO TREMOR
NAUSEA AND VOMITING: MILD NAUSEA WITH NO VOMITING
ANXIETY: 2
AGITATION: NORMAL ACTIVITY
HEADACHE, FULLNESS IN HEAD: NOT PRESENT
TREMOR: NOT VISIBLE, BUT CAN BE FELT FINGERTIP TO FINGERTIP
TOTAL SCORE: 4

## 2024-10-20 ASSESSMENT — COLUMBIA-SUICIDE SEVERITY RATING SCALE - C-SSRS
6. HAVE YOU EVER DONE ANYTHING, STARTED TO DO ANYTHING, OR PREPARED TO DO ANYTHING TO END YOUR LIFE?: NO
1. SINCE LAST CONTACT, HAVE YOU WISHED YOU WERE DEAD OR WISHED YOU COULD GO TO SLEEP AND NOT WAKE UP?: NO
1. IN THE PAST MONTH, HAVE YOU WISHED YOU WERE DEAD OR WISHED YOU COULD GO TO SLEEP AND NOT WAKE UP?: NO
2. HAVE YOU ACTUALLY HAD ANY THOUGHTS OF KILLING YOURSELF?: NO
6. HAVE YOU EVER DONE ANYTHING, STARTED TO DO ANYTHING, OR PREPARED TO DO ANYTHING TO END YOUR LIFE?: NO
2. HAVE YOU ACTUALLY HAD ANY THOUGHTS OF KILLING YOURSELF?: NO

## 2024-10-20 ASSESSMENT — PAIN DESCRIPTION - DESCRIPTORS: DESCRIPTORS: ACHING

## 2024-10-20 ASSESSMENT — PAIN SCALES - GENERAL: PAINLEVEL_OUTOF10: 9

## 2024-10-20 ASSESSMENT — PAIN DESCRIPTION - ORIENTATION: ORIENTATION: RIGHT

## 2024-10-20 ASSESSMENT — PAIN DESCRIPTION - FREQUENCY: FREQUENCY: CONSTANT/CONTINUOUS

## 2024-10-20 ASSESSMENT — PAIN DESCRIPTION - LOCATION: LOCATION: HAND

## 2024-10-20 ASSESSMENT — PAIN - FUNCTIONAL ASSESSMENT: PAIN_FUNCTIONAL_ASSESSMENT: 0-10

## 2024-10-20 ASSESSMENT — PAIN DESCRIPTION - PROGRESSION: CLINICAL_PROGRESSION: NOT CHANGED

## 2024-10-20 ASSESSMENT — PAIN DESCRIPTION - PAIN TYPE: TYPE: ACUTE PAIN

## 2024-10-20 NOTE — ED PROVIDER NOTES
Department of Emergency Medicine   ED  Provider Note  Admit Date/RoomTime: 10/20/2024  9:23 AM  ED Room: PSY21/PSY21        History of Present Illness:  Chief Complaint   Patient presents with    Insect Bite     Pt stung by a bee yesterday afternoon, he states he usually has reactions to stings. Pt was seen here yesterday and was given benadryl and ice pack. Right hand swollen         Malik Evans is a 64 y.o. male history of alcohol abuse alcohol intoxication was seen evaluated in the emergency department last night requesting rehab for alcohol detox.  Patient also was stung by bee yesterday on evaluation was given Benadryl and ice and then discharged to the rehab facility for detox and further evaluation and treatment when he got there they noticed that his hand was swollen and referred him back to the emergency department for evaluation patient complains of itching no pain reports of hand feels warm to him no redness he is able to bend and extend the hand although it is limited secondary to swelling he denies any tongue swelling lip swelling difficulty swallowing or breathing.  No other rashes lesions or sores.  States otherwise in his normal state of health no cough congestion runny nose sore throat no abdominal pain nausea or vomiting.  No tremors noted    Review of Systems:   Pertinent positives and negatives are stated within HPI, all other systems reviewed and are negative.        --------------------------------------------- PAST HISTORY ---------------------------------------------  Past Medical History:  has a past medical history of Alcohol use, Depression, and Hypertension.  Past Surgical History:  has no past surgical history on file.  Social History:  reports that he has been smoking cigarettes. He does not have any smokeless tobacco history on file. He reports current alcohol use. He reports current drug use. Drug: Marijuana.  Family History: family history includes Heart disease in an other  family member; Hypertension in an other family member.. Unless otherwise noted, family history is non contributory  The patient’s home medications have been reviewed.  Allergies: Patient has no known allergies.        ---------------------------------------------------PHYSICAL EXAM--------------------------------------    GENERAL APPEARANCE: Awake and alert.   Psych: Pleasant interactive cooperative.  No signs of withdrawal  VITAL SIGNS: As per the nurses' triage record.  Elevated blood pressure  HEENT: Normocephalic, atraumatic. Extraocular muscles are intact. Pupils equal round and reactive to light. Conjunctiva are pink. Negative scleral icterus. Mucous membranes are moist. Tongue in the midline. Pharynx was without erythema or exudates, uvula midline  NECK: Soft Nontender and supple, full gross ROM, no meningeal signs.  CHEST: Nontender to palpation. Clear to auscultation bilaterally. No rales, rhonchi, or wheezing.   HEART: S1, S2. Regular rate and rhythm. No murmurs, gallops or rubs.  Strong and equal pulses in the extremities.   ABDOMEN: Soft, nontender, nondistended, positive bowel sounds, no palpable masses.  MUSCULCSKELETAL:   Right hand: Swollen able to bend and extend the fingers although limited secondary to the swelling patient has a bee sting to the hand dorsal aspect in the webspace between the thumb and the finger.  No erythema noted.  No lipogenic streaking no purulent drainage no bruising no redness or warmth.  Radial pulse strong and intact cap refill less than 2 sensation intact.  Moving all her extremities per normal    NEUROLOGICAL: Awake, alert and oriented x 3. Power intact in the upper and lower extremities. Sensation is intact to light touch in the upper and lower extremities.   IMMUNOLOGICAL: No lymphatic streaking noted   DERM: No petechiae,or ecchymoses.          ------------------------- NURSING NOTES AND VITALS REVIEWED ---------------------------  The nursing notes within the ED  encounter and vital signs as below have been reviewed by myself  BP (!) 160/96 (BP Location: Left arm, Patient Position: Sitting)   Pulse 77   Temp 36.8 °C (98.2 °F) (Oral)   Resp 16   Ht 1.829 m (6')   Wt 81.6 kg (180 lb)   SpO2 99%   BMI 24.41 kg/m²     Oxygen Saturation Interpretation: 99% room air      The patient’s available past medical records and past encounters were reviewed.          -----------------------DIAGNOSTIC RESULTS------------------------  LABS:    Labs Reviewed - No data to display    As interpreted by me, the above displayed labs are abnormal. All other labs obtained during this visit were within normal range or not returned as of this dictation.        No orders to display           No orders to display           ------------------------------ ED COURSE/MEDICAL DECISION MAKING----------------------  Medical Decision Making:   Exam: A medically appropriate exam performed, outlined above, given the known history and presentation.    History obtained from: Review of medical record nursing notes patient      Social Determinants of Health considered during this visit: Currently at [facility] for detox discharge from the hospital this morning      PAST MEDICAL HISTORY/Chronic Conditions Affecting Care     has a past medical history of Alcohol use, Depression, and Hypertension.       CC/HPI Summary, Social Determinants of health, Records Reviewed, DDx, testing done/not done, ED Course, Reassessment, disposition considerations/shared decision making with patient, consults, disposition:   Patient presents with right hand swelling secondary to bee sting no signs of anaphylaxis or anaphylactic shock.  Does not appear to be a septic joint.  Bee sting happened yesterday no signs of infection.  Low suspicion for infection.  Patient was given ointment placed on steroids risk and benefits reviewed with him.  As well as Claritin Pepcid to help with histamine reaction.  Advised to monitor for  signs and symptoms of infection or worsening allergic reaction patient is amenable to plan amenable to discharge will return back to [facility] for detox patient seen and evaluated with attending physician Dr. Worthington     Blood pressure noted be elevated improved on repeat blood pressure.  Will have him follow-up with primary care physician journal his blood pressure daily.  No signs of angioedema, lungs are clear on exam no signs of respiratory distress he is not hypoxic tachypneic febrile or in any type of respiratory distress no hives noted.  CMT for discharge would like to discharge planning  PROCEDURES  Unless otherwise noted below, none      CONSULTS:   None      Diagnoses as of 10/20/24 1748   Bee sting reaction, accidental or unintentional, subsequent encounter   Localized swelling on right hand         This patient has remained hemodynamically stable during their ED course.      Critical Care: None      Counseling:  The emergency provider has spoken with the patient and discussed today’s results, in addition to providing specific details for the plan of care and counseling regarding the diagnosis and prognosis.  Questions are answered at this time and they are agreeable with the plan.         --------------------------------- IMPRESSION AND DISPOSITION ---------------------------------    IMPRESSION  1. Bee sting reaction, accidental or unintentional, subsequent encounter    2. Localized swelling on right hand        DISPOSITION  Disposition: Discharged to [facility]  Patient condition is stable        NOTE: This report was transcribed using voice recognition software. Every effort was made to ensure accuracy; however, inadvertent computerized transcription errors may be present      Felicia Wright, COOKIE-CNP  10/20/24 7378

## 2024-10-20 NOTE — ED PROVIDER NOTES
HPI   Chief Complaint   Patient presents with    Alcohol Problem       HPI  62-year-old male presents requesting alcohol detox.  Patient was seen yesterday and was supposed to go to detox 8:00 morning but apparently that did not work out so he came back to the emerged department tonight.  No other complaints      Patient History   Past Medical History:   Diagnosis Date    Alcohol use     Depression     Hypertension      History reviewed. No pertinent surgical history.  Family History   Problem Relation Name Age of Onset    Hypertension Other      Heart disease Other       Social History     Tobacco Use    Smoking status: Every Day     Types: Cigarettes    Smokeless tobacco: Not on file   Substance Use Topics    Alcohol use: Yes     Comment: 3 pints of vodka a day    Drug use: Yes     Types: Marijuana       Physical Exam   ED Triage Vitals [10/19/24 2150]   Temperature Heart Rate Respirations BP   36.1 °C (97 °F) 86 16 133/90      Pulse Ox Temp Source Heart Rate Source Patient Position   98 % Temporal -- --      BP Location FiO2 (%)     -- --       Physical Exam  General:  Awake, alert, no acute distress.  Head: Normocephalic, Atraumatic  Neck: Supple, trachea midline, no stridor  Skin: Warm and dry, no rashes   Lungs:  No acute respiratory distress, speaking in full sentences without difficulty  Neuro:  No gross focal neurologic deficits, NIH is 0  Musculoskeletal:  Full range of motion in all 4 extremities  Psychiatric:  Alert oriented x 3, Good insight into condition.    ED Course & MDM   Diagnoses as of 10/20/24 0328   Alcohol abuse                 No data recorded     [place] Coma Scale Score: 15 (10/19/24 2149 : Rosette Marsh RN)                           Medical Decision Making  My EKG interpretation:  Normal sinus rhythm 82 bpm normal axis HI interval 168 QTc 420 no ectopy acute ischemic changes noted    Patient is been stable during his visit.  He is medically cleared to go to a alcohol detox  unit.    Patient was accepted to [facility] and transferred without complication.      Procedure  Procedures     Barrett Rueda, DO  10/20/24 0327       Barrett Rueda, DO  10/20/24 0328       Barrett Rueda, DO  10/20/24 0352

## 2024-10-20 NOTE — ED TRIAGE NOTES
Patient here for alcohol detox. Was discharged from here to [facility] yesterday morning but he was in the intake for too long and panicked and left. Has drank about 1.5 pints of liquor since then. Last drink was about 1 hour ago.

## 2024-10-20 NOTE — DISCHARGE INSTRUCTIONS
First dose of steroids given in the emergency department start tomorrow.  Monitor for signs and symptoms of infection.  Ice rest and elevate.  Tylenol for pain.  Monitor for signs and symptoms of worsening allergic reaction.  Take steroids with food to help with absorption and stomach upset.  Return with any worsening symptoms or concerns follow-up in 2 days with primary care for wound check reevaluation    Today it was noted that your blood pressure was elevated follow-up with primary care physician for reevaluation.  Journal your blood pressure daily for primary care

## 2024-10-21 LAB
ATRIAL RATE: 82 BPM
BACTERIA UR CULT: NO GROWTH
P AXIS: 70 DEGREES
P OFFSET: 179 MS
P ONSET: 135 MS
PR INTERVAL: 168 MS
Q ONSET: 219 MS
QRS COUNT: 14 BEATS
QRS DURATION: 94 MS
QT INTERVAL: 360 MS
QTC CALCULATION(BAZETT): 420 MS
QTC FREDERICIA: 399 MS
R AXIS: 56 DEGREES
T AXIS: 61 DEGREES
T OFFSET: 399 MS
VENTRICULAR RATE: 82 BPM

## 2024-10-22 LAB
ATRIAL RATE: 85 BPM
P AXIS: 79 DEGREES
P OFFSET: 195 MS
P ONSET: 139 MS
PR INTERVAL: 172 MS
Q ONSET: 225 MS
QRS COUNT: 14 BEATS
QRS DURATION: 92 MS
QT INTERVAL: 352 MS
QTC CALCULATION(BAZETT): 418 MS
QTC FREDERICIA: 395 MS
R AXIS: 74 DEGREES
T AXIS: 64 DEGREES
T OFFSET: 401 MS
VENTRICULAR RATE: 85 BPM

## 2025-01-09 ENCOUNTER — HOSPITAL ENCOUNTER (EMERGENCY)
Facility: HOSPITAL | Age: 65
Discharge: HOME | End: 2025-01-09
Payer: COMMERCIAL

## 2025-01-09 VITALS
HEIGHT: 72 IN | WEIGHT: 180 LBS | BODY MASS INDEX: 24.38 KG/M2 | OXYGEN SATURATION: 98 % | DIASTOLIC BLOOD PRESSURE: 102 MMHG | SYSTOLIC BLOOD PRESSURE: 144 MMHG | TEMPERATURE: 98.4 F | RESPIRATION RATE: 18 BRPM | HEART RATE: 115 BPM

## 2025-01-09 PROCEDURE — 4500999001 HC ED NO CHARGE

## 2025-01-09 PROCEDURE — 99281 EMR DPT VST MAYX REQ PHY/QHP: CPT

## 2025-01-11 ENCOUNTER — APPOINTMENT (OUTPATIENT)
Dept: RADIOLOGY | Facility: HOSPITAL | Age: 65
End: 2025-01-11
Payer: COMMERCIAL

## 2025-01-11 ENCOUNTER — APPOINTMENT (OUTPATIENT)
Dept: CARDIOLOGY | Facility: HOSPITAL | Age: 65
End: 2025-01-11
Payer: COMMERCIAL

## 2025-01-11 ENCOUNTER — HOSPITAL ENCOUNTER (EMERGENCY)
Facility: HOSPITAL | Age: 65
Discharge: OTHER INSTITUTION | End: 2025-01-11
Attending: EMERGENCY MEDICINE
Payer: COMMERCIAL

## 2025-01-11 VITALS
HEART RATE: 84 BPM | OXYGEN SATURATION: 96 % | WEIGHT: 180 LBS | SYSTOLIC BLOOD PRESSURE: 163 MMHG | DIASTOLIC BLOOD PRESSURE: 95 MMHG | RESPIRATION RATE: 14 BRPM | TEMPERATURE: 97.9 F | BODY MASS INDEX: 24.38 KG/M2 | HEIGHT: 72 IN

## 2025-01-11 DIAGNOSIS — F10.10 ALCOHOL ABUSE: Primary | ICD-10-CM

## 2025-01-11 DIAGNOSIS — F41.9 ANXIETY AND DEPRESSION: ICD-10-CM

## 2025-01-11 DIAGNOSIS — F32.A ANXIETY AND DEPRESSION: ICD-10-CM

## 2025-01-11 LAB
ALBUMIN SERPL BCP-MCNC: 3.9 G/DL (ref 3.4–5)
ALP SERPL-CCNC: 80 U/L (ref 33–136)
ALT SERPL W P-5'-P-CCNC: 24 U/L (ref 10–52)
AMPHETAMINES UR QL SCN: NORMAL
ANION GAP SERPL CALCULATED.3IONS-SCNC: 13 MMOL/L (ref 10–20)
APAP SERPL-MCNC: <10 UG/ML
APPEARANCE UR: CLEAR
AST SERPL W P-5'-P-CCNC: 36 U/L (ref 9–39)
BARBITURATES UR QL SCN: NORMAL
BASOPHILS # BLD AUTO: 0.05 X10*3/UL (ref 0–0.1)
BASOPHILS NFR BLD AUTO: 0.5 %
BENZODIAZ UR QL SCN: NORMAL
BILIRUB SERPL-MCNC: 0.3 MG/DL (ref 0–1.2)
BILIRUB UR STRIP.AUTO-MCNC: NEGATIVE MG/DL
BNP SERPL-MCNC: 82 PG/ML (ref 0–99)
BUN SERPL-MCNC: 14 MG/DL (ref 6–23)
BZE UR QL SCN: NORMAL
CALCIUM SERPL-MCNC: 8.1 MG/DL (ref 8.6–10.3)
CANNABINOIDS UR QL SCN: NORMAL
CARDIAC TROPONIN I PNL SERPL HS: 14 NG/L (ref 0–20)
CARDIAC TROPONIN I PNL SERPL HS: 16 NG/L (ref 0–20)
CHLORIDE SERPL-SCNC: 102 MMOL/L (ref 98–107)
CO2 SERPL-SCNC: 25 MMOL/L (ref 21–32)
COLOR UR: COLORLESS
CREAT SERPL-MCNC: 1 MG/DL (ref 0.5–1.3)
D DIMER PPP FEU-MCNC: 0.62 MG/L FEU (ref 0.19–0.5)
EGFRCR SERPLBLD CKD-EPI 2021: 85 ML/MIN/1.73M*2
EOSINOPHIL # BLD AUTO: 0.09 X10*3/UL (ref 0–0.7)
EOSINOPHIL NFR BLD AUTO: 0.8 %
ERYTHROCYTE [DISTWIDTH] IN BLOOD BY AUTOMATED COUNT: 12.1 % (ref 11.5–14.5)
ETHANOL SERPL-MCNC: <10 MG/DL
FENTANYL+NORFENTANYL UR QL SCN: NORMAL
FLUAV RNA RESP QL NAA+PROBE: NOT DETECTED
FLUBV RNA RESP QL NAA+PROBE: NOT DETECTED
GLUCOSE SERPL-MCNC: 97 MG/DL (ref 74–99)
GLUCOSE UR STRIP.AUTO-MCNC: NORMAL MG/DL
HCT VFR BLD AUTO: 37.2 % (ref 41–52)
HGB BLD-MCNC: 13.6 G/DL (ref 13.5–17.5)
IMM GRANULOCYTES # BLD AUTO: 0.04 X10*3/UL (ref 0–0.7)
IMM GRANULOCYTES NFR BLD AUTO: 0.4 % (ref 0–0.9)
KETONES UR STRIP.AUTO-MCNC: NEGATIVE MG/DL
LACTATE SERPL-SCNC: 1.9 MMOL/L (ref 0.4–2)
LEUKOCYTE ESTERASE UR QL STRIP.AUTO: NEGATIVE
LYMPHOCYTES # BLD AUTO: 1.97 X10*3/UL (ref 1.2–4.8)
LYMPHOCYTES NFR BLD AUTO: 18 %
MCH RBC QN AUTO: 32.8 PG (ref 26–34)
MCHC RBC AUTO-ENTMCNC: 36.6 G/DL (ref 32–36)
MCV RBC AUTO: 90 FL (ref 80–100)
METHADONE UR QL SCN: NORMAL
MONOCYTES # BLD AUTO: 0.63 X10*3/UL (ref 0.1–1)
MONOCYTES NFR BLD AUTO: 5.8 %
NEUTROPHILS # BLD AUTO: 8.14 X10*3/UL (ref 1.2–7.7)
NEUTROPHILS NFR BLD AUTO: 74.5 %
NITRITE UR QL STRIP.AUTO: NEGATIVE
NRBC BLD-RTO: 0 /100 WBCS (ref 0–0)
OPIATES UR QL SCN: NORMAL
OXYCODONE+OXYMORPHONE UR QL SCN: NORMAL
PCP UR QL SCN: NORMAL
PH UR STRIP.AUTO: 8 [PH]
PLATELET # BLD AUTO: 201 X10*3/UL (ref 150–450)
POTASSIUM SERPL-SCNC: 3.7 MMOL/L (ref 3.5–5.3)
PROT SERPL-MCNC: 6.2 G/DL (ref 6.4–8.2)
PROT UR STRIP.AUTO-MCNC: NEGATIVE MG/DL
RBC # BLD AUTO: 4.15 X10*6/UL (ref 4.5–5.9)
RBC # UR STRIP.AUTO: NEGATIVE /UL
S PYO DNA THROAT QL NAA+PROBE: NOT DETECTED
SALICYLATES SERPL-MCNC: <3 MG/DL
SODIUM SERPL-SCNC: 136 MMOL/L (ref 136–145)
SP GR UR STRIP.AUTO: 1.01
UROBILINOGEN UR STRIP.AUTO-MCNC: NORMAL MG/DL
WBC # BLD AUTO: 10.9 X10*3/UL (ref 4.4–11.3)

## 2025-01-11 PROCEDURE — 83880 ASSAY OF NATRIURETIC PEPTIDE: CPT | Performed by: EMERGENCY MEDICINE

## 2025-01-11 PROCEDURE — 84075 ASSAY ALKALINE PHOSPHATASE: CPT | Performed by: EMERGENCY MEDICINE

## 2025-01-11 PROCEDURE — 80143 DRUG ASSAY ACETAMINOPHEN: CPT | Performed by: EMERGENCY MEDICINE

## 2025-01-11 PROCEDURE — 36415 COLL VENOUS BLD VENIPUNCTURE: CPT | Performed by: EMERGENCY MEDICINE

## 2025-01-11 PROCEDURE — 80307 DRUG TEST PRSMV CHEM ANLYZR: CPT | Performed by: EMERGENCY MEDICINE

## 2025-01-11 PROCEDURE — 2500000004 HC RX 250 GENERAL PHARMACY W/ HCPCS (ALT 636 FOR OP/ED): Performed by: EMERGENCY MEDICINE

## 2025-01-11 PROCEDURE — 2500000001 HC RX 250 WO HCPCS SELF ADMINISTERED DRUGS (ALT 637 FOR MEDICARE OP): Performed by: EMERGENCY MEDICINE

## 2025-01-11 PROCEDURE — 99285 EMERGENCY DEPT VISIT HI MDM: CPT | Mod: 25 | Performed by: EMERGENCY MEDICINE

## 2025-01-11 PROCEDURE — 71045 X-RAY EXAM CHEST 1 VIEW: CPT

## 2025-01-11 PROCEDURE — 71275 CT ANGIOGRAPHY CHEST: CPT | Performed by: STUDENT IN AN ORGANIZED HEALTH CARE EDUCATION/TRAINING PROGRAM

## 2025-01-11 PROCEDURE — 84484 ASSAY OF TROPONIN QUANT: CPT | Performed by: EMERGENCY MEDICINE

## 2025-01-11 PROCEDURE — 85025 COMPLETE CBC W/AUTO DIFF WBC: CPT | Performed by: EMERGENCY MEDICINE

## 2025-01-11 PROCEDURE — 71275 CT ANGIOGRAPHY CHEST: CPT

## 2025-01-11 PROCEDURE — 87502 INFLUENZA DNA AMP PROBE: CPT | Performed by: EMERGENCY MEDICINE

## 2025-01-11 PROCEDURE — 96374 THER/PROPH/DIAG INJ IV PUSH: CPT | Mod: 59

## 2025-01-11 PROCEDURE — 2550000001 HC RX 255 CONTRASTS: Performed by: EMERGENCY MEDICINE

## 2025-01-11 PROCEDURE — 85300 ANTITHROMBIN III ACTIVITY: CPT | Performed by: EMERGENCY MEDICINE

## 2025-01-11 PROCEDURE — 93005 ELECTROCARDIOGRAM TRACING: CPT

## 2025-01-11 PROCEDURE — 90839 PSYTX CRISIS INITIAL 60 MIN: CPT

## 2025-01-11 PROCEDURE — 71045 X-RAY EXAM CHEST 1 VIEW: CPT | Performed by: RADIOLOGY

## 2025-01-11 PROCEDURE — 81003 URINALYSIS AUTO W/O SCOPE: CPT | Performed by: EMERGENCY MEDICINE

## 2025-01-11 PROCEDURE — 83605 ASSAY OF LACTIC ACID: CPT | Performed by: EMERGENCY MEDICINE

## 2025-01-11 PROCEDURE — 82435 ASSAY OF BLOOD CHLORIDE: CPT | Performed by: EMERGENCY MEDICINE

## 2025-01-11 PROCEDURE — 87651 STREP A DNA AMP PROBE: CPT | Performed by: EMERGENCY MEDICINE

## 2025-01-11 RX ORDER — MIRTAZAPINE 45 MG/1
45 TABLET, FILM COATED ORAL NIGHTLY
COMMUNITY

## 2025-01-11 RX ORDER — MULTIVIT-MIN/IRON FUM/FOLIC AC 7.5 MG-4
1 TABLET ORAL ONCE
Status: COMPLETED | OUTPATIENT
Start: 2025-01-11 | End: 2025-01-11

## 2025-01-11 RX ORDER — FOLIC ACID 1 MG/1
1 TABLET ORAL ONCE
Status: COMPLETED | OUTPATIENT
Start: 2025-01-11 | End: 2025-01-11

## 2025-01-11 RX ORDER — DIAZEPAM 5 MG/1
10 TABLET ORAL EVERY 2 HOUR PRN
Status: DISCONTINUED | OUTPATIENT
Start: 2025-01-11 | End: 2025-01-12 | Stop reason: HOSPADM

## 2025-01-11 RX ORDER — HYDROXYZINE HYDROCHLORIDE 25 MG/1
25 TABLET, FILM COATED ORAL EVERY 4 HOURS PRN
COMMUNITY

## 2025-01-11 RX ORDER — THIAMINE HYDROCHLORIDE 100 MG/ML
100 INJECTION, SOLUTION INTRAMUSCULAR; INTRAVENOUS DAILY
Status: DISCONTINUED | OUTPATIENT
Start: 2025-01-11 | End: 2025-01-12 | Stop reason: HOSPADM

## 2025-01-11 RX ORDER — LANOLIN ALCOHOL/MO/W.PET/CERES
100 CREAM (GRAM) TOPICAL DAILY
Status: DISCONTINUED | OUTPATIENT
Start: 2025-01-14 | End: 2025-01-12 | Stop reason: HOSPADM

## 2025-01-11 RX ADMIN — FOLIC ACID 1 MG: 1 TABLET ORAL at 18:47

## 2025-01-11 RX ADMIN — Medication 1 TABLET: at 18:47

## 2025-01-11 RX ADMIN — IOHEXOL 75 ML: 350 INJECTION, SOLUTION INTRAVENOUS at 20:43

## 2025-01-11 RX ADMIN — THIAMINE HYDROCHLORIDE 100 MG: 100 INJECTION, SOLUTION INTRAMUSCULAR; INTRAVENOUS at 18:47

## 2025-01-11 SDOH — HEALTH STABILITY: MENTAL HEALTH: DEPRESSION SYMPTOMS: NO PROBLEMS REPORTED OR OBSERVED.

## 2025-01-11 SDOH — HEALTH STABILITY: MENTAL HEALTH: HAVE YOU EVER TRIED TO KILL YOURSELF?: NO

## 2025-01-11 SDOH — ECONOMIC STABILITY: HOUSING INSECURITY: FEELS SAFE LIVING IN HOME: YES

## 2025-01-11 SDOH — HEALTH STABILITY: MENTAL HEALTH: ANXIETY SYMPTOMS: PANIC ATTACK

## 2025-01-11 SDOH — HEALTH STABILITY: MENTAL HEALTH: ARE YOU HAVING THOUGHTS OF KILLING YOURSELF RIGHT NOW?: NO

## 2025-01-11 SDOH — HEALTH STABILITY: MENTAL HEALTH: IN THE PAST FEW WEEKS, HAVE YOU WISHED YOU WERE DEAD?: NO

## 2025-01-11 SDOH — HEALTH STABILITY: MENTAL HEALTH: IN THE PAST FEW WEEKS, HAVE YOU FELT THAT YOU OR YOUR FAMILY WOULD BE BETTER OFF IF YOU WERE DEAD?: NO

## 2025-01-11 SDOH — HEALTH STABILITY: MENTAL HEALTH: IN THE PAST WEEK, HAVE YOU BEEN HAVING THOUGHTS ABOUT KILLING YOURSELF?: NO

## 2025-01-11 ASSESSMENT — LIFESTYLE VARIABLES
TOTAL SCORE: 0
EVER HAD A DRINK FIRST THING IN THE MORNING TO STEADY YOUR NERVES TO GET RID OF A HANGOVER: NO
HAVE YOU EVER FELT YOU SHOULD CUT DOWN ON YOUR DRINKING: NO
EVER FELT BAD OR GUILTY ABOUT YOUR DRINKING: NO
HAVE PEOPLE ANNOYED YOU BY CRITICIZING YOUR DRINKING: NO
PRESCIPTION_ABUSE_PAST_12_MONTHS: NO
SUBSTANCE_ABUSE_PAST_12_MONTHS: NO

## 2025-01-11 ASSESSMENT — PAIN - FUNCTIONAL ASSESSMENT: PAIN_FUNCTIONAL_ASSESSMENT: 0-10

## 2025-01-11 ASSESSMENT — COLUMBIA-SUICIDE SEVERITY RATING SCALE - C-SSRS
2. HAVE YOU ACTUALLY HAD ANY THOUGHTS OF KILLING YOURSELF?: NO
1. IN THE PAST MONTH, HAVE YOU WISHED YOU WERE DEAD OR WISHED YOU COULD GO TO SLEEP AND NOT WAKE UP?: NO
6. HAVE YOU EVER DONE ANYTHING, STARTED TO DO ANYTHING, OR PREPARED TO DO ANYTHING TO END YOUR LIFE?: NO

## 2025-01-11 ASSESSMENT — PAIN SCALES - GENERAL: PAINLEVEL_OUTOF10: 0 - NO PAIN

## 2025-01-11 NOTE — ED TRIAGE NOTES
Pt states he is having bad panic attacks and has been drinking the last few days. Pt states he is having a nervous breakdown.

## 2025-01-11 NOTE — ED PROVIDER NOTES
HPI   Chief Complaint   Patient presents with    Psychiatric Evaluation     Pt states he is having bad panic attacks and has been drinking the last few days. Pt states he is having a nervous breakdown.       HPI  Patient is a 65-year-old male with history of alcohol abuse, depression and anxiety who is well-known to the ER presenting for evaluation of alcohol abuse and anxiety.  Patient states that he has been drinking approximately 2 to 3 pints of liquor a day.  States that his last drink was this morning he had about a half a pint of liquor.  Patient states that he is currently living in a shed outside and has not showered in 2 weeks and cannot live like this anymore.  He states that he has been having a nervous breakdown due to the circumstances of his life.  He denies suicidal or homicidal ideation.  States that he has not been following up with [place] mental health therapy.  He states he did smoke cannabis a few weeks ago but otherwise denies drug use.  He has been admitted to [facility] 4 times in the past 6 months for detox.      Patient History   Past Medical History:   Diagnosis Date    Alcohol use     Depression     Hypertension      History reviewed. No pertinent surgical history.  Family History   Problem Relation Name Age of Onset    Hypertension Other      Heart disease Other       Social History     Tobacco Use    Smoking status: Every Day     Types: Cigarettes    Smokeless tobacco: Not on file   Substance Use Topics    Alcohol use: Yes     Comment: 3 pints of vodka a day    Drug use: Yes     Types: Marijuana       Physical Exam   ED Triage Vitals [01/11/25 1802]   Temperature Heart Rate Respirations BP   36.6 °C (97.9 °F) (!) 112 18 117/77      Pulse Ox Temp Source Heart Rate Source Patient Position   100 % Tympanic Monitor Sitting      BP Location FiO2 (%)     Left arm --       Physical Exam  Vitals and nursing note reviewed.   Constitutional:       General: He is not in acute distress.      Appearance: He is well-developed.      Comments: Disheveled, malodorous but otherwise in no apparent distress   HENT:      Head: Normocephalic and atraumatic.      Mouth/Throat:      Comments: Poor dentition  Eyes:      Conjunctiva/sclera: Conjunctivae normal.   Cardiovascular:      Rate and Rhythm: Regular rhythm. Tachycardia present.      Heart sounds: No murmur heard.  Pulmonary:      Effort: Pulmonary effort is normal. No respiratory distress.      Breath sounds: Normal breath sounds.   Abdominal:      Palpations: Abdomen is soft.      Tenderness: There is no abdominal tenderness.   Musculoskeletal:         General: No swelling.      Cervical back: Neck supple.   Skin:     General: Skin is warm and dry.      Capillary Refill: Capillary refill takes less than 2 seconds.   Neurological:      Mental Status: He is alert.   Psychiatric:         Mood and Affect: Mood normal.           ED Course & MDM   ED Course as of 01/11/25 2230   Sat Jan 11, 2025 2129 Patient is medically cleared for inpatient psychiatric hospitalization and detox. [JJ]      ED Course User Index  [JJ] La Dennis PA-C         Diagnoses as of 01/11/25 2230   Alcohol abuse   Anxiety and depression                 No data recorded     [place] Coma Scale Score: 15 (01/11/25 1802 : Netta Shelton, FRANCIS)                           Medical Decision Making  Parts of this chart have been completed using voice recognition software. Please excuse any errors of transcription.  My thought process and reason for plan has been formulated from the time that I saw the patient until the time of disposition and is not specific to one specific moment during their visit and furthermore my MDM encompasses this entire chart and not only this text box.      HPI: Detailed above.    Exam: A medically appropriate exam performed, outlined above, given the known history and presentation.    History obtained from: Patient    EKG: Interpreted by attending physician and reviewed  by me    Social Determinants of Health considered during this visit: Currently living in a shed    Medications given during visit:  Medications   thiamine (Vitamin B1) injection 100 mg (100 mg intravenous Given 1/11/25 1847)   thiamine (Vitamin B-1) tablet 100 mg (has no administration in time range)   diazePAM (Valium) tablet 10 mg (has no administration in time range)   folic acid (Folvite) tablet 1 mg (1 mg oral Given 1/11/25 1847)   multivitamin with minerals 1 tablet (1 tablet oral Given 1/11/25 1847)   iohexol (OMNIPaque) 350 mg iodine/mL solution 75 mL (75 mL intravenous Given 1/11/25 2043)        Diagnostic/tests  Labs Reviewed   CBC WITH AUTO DIFFERENTIAL - Abnormal       Result Value    WBC 10.9      nRBC 0.0      RBC 4.15 (*)     Hemoglobin 13.6      Hematocrit 37.2 (*)     MCV 90      MCH 32.8      MCHC 36.6 (*)     RDW 12.1      Platelets 201      Neutrophils % 74.5      Immature Granulocytes %, Automated 0.4      Lymphocytes % 18.0      Monocytes % 5.8      Eosinophils % 0.8      Basophils % 0.5      Neutrophils Absolute 8.14 (*)     Immature Granulocytes Absolute, Automated 0.04      Lymphocytes Absolute 1.97      Monocytes Absolute 0.63      Eosinophils Absolute 0.09      Basophils Absolute 0.05     COMPREHENSIVE METABOLIC PANEL - Abnormal    Glucose 97      Sodium 136      Potassium 3.7      Chloride 102      Bicarbonate 25      Anion Gap 13      Urea Nitrogen 14      Creatinine 1.00      eGFR 85      Calcium 8.1 (*)     Albumin 3.9      Alkaline Phosphatase 80      Total Protein 6.2 (*)     AST 36      Bilirubin, Total 0.3      ALT 24     D-DIMER, NON VTE - Abnormal    D-Dimer Non VTE, Quant (mg/L FEU) 0.62 (*)     Narrative:     THROMBOEMBOLIC EVENTS CANNOT BE EXCLUDED SOLELY ON THE BASIS OF THE D-DIMER LEVEL BEING WITHIN THE NORMAL REFERENCE RANGE. D-DIMER LEVELS LESS THAN 0.5 MG/L FEU IN CONJUNCTION WITH A LOW CLINICAL PROBABILITY HAVE AN EXCELLENT NEGATIVE PREDICTIVE VALUE IN EXCLUDING A  DIAGNOSIS OF PULMONARY EMBOLUS (PE) OR DEEP VEIN THROMBOSIS (DVT). ELEVATED D-DIMER LEVELS ARE NOT SPECIFIC TO PE OR DVT, AND MAY BE SEEN IN PATIENTS WITH DIC, ADVANCED AGE, PREGNANCY, MALIGNANCY, LIVER DISEASE, INFECTION, AND INFLAMMATORY CONDITIONS AMONG OTHERS. D-DIMER LEVELS MAY BE DECREASED IN PATIENTS RECEIVING ANTI-COAGULATION THERAPY.   URINALYSIS WITH REFLEX CULTURE AND MICROSCOPIC - Abnormal    Color, Urine Colorless (*)     Appearance, Urine Clear      Specific Gravity, Urine 1.013      pH, Urine 8.0      Protein, Urine NEGATIVE      Glucose, Urine Normal      Blood, Urine NEGATIVE      Ketones, Urine NEGATIVE      Bilirubin, Urine NEGATIVE      Urobilinogen, Urine Normal      Nitrite, Urine NEGATIVE      Leukocyte Esterase, Urine NEGATIVE     GROUP A STREPTOCOCCUS, PCR - Normal    Group A Strep PCR Not Detected     B-TYPE NATRIURETIC PEPTIDE - Normal    BNP 82      Narrative:        <100 pg/mL - Heart failure unlikely  100-299 pg/mL - Intermediate probability of acute heart                  failure exacerbation. Correlate with clinical                  context and patient history.    >=300 pg/mL - Heart Failure likely. Correlate with clinical                  context and patient history.    BNP testing is performed using different testing methodology at [facility] than at other [facility]. Direct result comparisons should only be made within the same method.      LACTATE - Normal    Lactate 1.9      Narrative:     Venipuncture immediately after or during the administration of Metamizole may lead to falsely low results. Testing should be performed immediately prior to Metamizole dosing.   INFLUENZA A AND B PCR - Normal    Flu A Result Not Detected      Flu B Result Not Detected      Narrative:     This assay is an in vitro diagnostic multiplex nucleic acid amplification test for the detection and discrimination of Influenza A & B from nasopharyngeal specimens, and has been validated  for use at [facility]. Negative results do not preclude Influenza A/B infections, and should not be used as the sole basis for diagnosis, treatment, or other management decisions. If Influenza A/B and RSV PCR results are negative, testing for Parainfluenza virus, Adenovirus and Metapneumovirus is routinely performed for [facility] pediatric oncology and intensive care inpatients, and is available on other patients by placing an add-on request.   SERIAL TROPONIN-INITIAL - Normal    Troponin I, High Sensitivity 14      Narrative:     Less than 99th percentile of normal range cutoff-  Female and children under 18 years old <14 ng/L; Male <21 ng/L: Negative  Repeat testing should be performed if clinically indicated.     Female and children under 18 years old 14-50 ng/L; Male 21-50 ng/L:  Consistent with possible cardiac damage and possible increased clinical   risk. Serial measurements may help to assess extent of myocardial damage.     >50 ng/L: Consistent with cardiac damage, increased clinical risk and  myocardial infarction. Serial measurements may help assess extent of   myocardial damage.      NOTE: Children less than 1 year old may have higher baseline troponin   levels and results should be interpreted in conjunction with the overall   clinical context.     NOTE: Troponin I testing is performed using a different   testing methodology at [facility] than at [facility]. Direct result comparisons should only   be made within the same method.   ACUTE TOXICOLOGY PANEL, BLOOD - Normal    Acetaminophen <10.0      Salicylate  <3      Alcohol <10     DRUG SCREEN,URINE - Normal    Amphetamine Screen, Urine Presumptive Negative      Barbiturate Screen, Urine Presumptive Negative      Benzodiazepines Screen, Urine Presumptive Negative      Cannabinoid Screen, Urine Presumptive Negative      Cocaine Metabolite Screen, Urine Presumptive Negative      Fentanyl Screen, Urine  Presumptive Negative      Opiate Screen, Urine Presumptive Negative      Oxycodone Screen, Urine Presumptive Negative      PCP Screen, Urine Presumptive Negative      Methadone Screen, Urine Presumptive Negative      Narrative:     Drug screen results are presumptive and should not be used to assess   compliance with prescribed medication. Contact the performing [facility] laboratory   to add-on definitive confirmatory testing if clinically indicated.    Toxicology screening results are reported qualitatively. The concentration must   be greater than or equal to the cutoff to be reported as positive. The concentration   at which the screening test can detect an individual drug or metabolite varies.   The absence of expected drug(s) and/or drug metabolite(s) may indicate non-compliance,   inappropriate timing of specimen collection relative to drug administration, poor drug   absorption, diluted/adulterated urine, or limitations of testing. For medical purposes   only; not valid for forensic use.    Interpretive questions should be directed to the laboratory medical directors.   SERIAL TROPONIN, 1 HOUR - Normal    Troponin I, High Sensitivity 16      Narrative:     Less than 99th percentile of normal range cutoff-  Female and children under 18 years old <14 ng/L; Male <21 ng/L: Negative  Repeat testing should be performed if clinically indicated.     Female and children under 18 years old 14-50 ng/L; Male 21-50 ng/L:  Consistent with possible cardiac damage and possible increased clinical   risk. Serial measurements may help to assess extent of myocardial damage.     >50 ng/L: Consistent with cardiac damage, increased clinical risk and  myocardial infarction. Serial measurements may help assess extent of   myocardial damage.      NOTE: Children less than 1 year old may have higher baseline troponin   levels and results should be interpreted in conjunction with the overall   clinical context.     NOTE: Troponin I testing is  performed using a different   testing methodology at [facility] than at other   [facility]. Direct result comparisons should only   be made within the same method.   TROPONIN SERIES- (INITIAL, 1 HR)    Narrative:     The following orders were created for panel order Troponin I Series, High Sensitivity (0, 1 HR).  Procedure                               Abnormality         Status                     ---------                               -----------         ------                     Troponin I, High Sensiti...[[id number]]  Normal              Final result               Troponin, High Sensitivi...[[id number]]  Normal              Final result                 Please view results for these tests on the individual orders.   URINALYSIS WITH REFLEX CULTURE AND MICROSCOPIC    Narrative:     The following orders were created for panel order Urinalysis with Reflex Culture and Microscopic.  Procedure                               Abnormality         Status                     ---------                               -----------         ------                     Urinalysis with Reflex C...[[id number]]  Abnormal            Final result               Extra Urine Gray Tube[[id number]]                            In process                   Please view results for these tests on the individual orders.   EXTRA URINE GRAY TUBE      CT angio chest for pulmonary embolism   Final Result   1. No evidence of pulmonary embolism or other acute cardiopulmonary   abnormality.   2. Moderate coronary artery calcifications.        MACRO:   None        Signed by: Kameron Alejandra 1/11/2025 9:24 PM   Dictation workstation:   [id number]      XR chest 1 view   Final Result   No evidence of acute intrathoracic abnormality.        Signed by: Maulik Gallardo 1/11/2025 7:15 PM   Dictation workstation:   [id number]           Considerations/further MDM:  Patient is a 65-year-old male presenting for psychiatric evaluation, alcohol  detox    I saw this patient in conjunction with Dr. Diaz.    Patient awake alert in no apparent distress during the ER visit.  He has no physical complaints.  Vital signs with tachycardia but otherwise within normal limits.  Laboratory workup is unremarkable.  D-dimer is elevated thus CT angio for PE was pursued without evidence of pulmonary embolism. Patient is medically cleared for inpatient psychiatric hospitalization.  Patient seen and evaluated by crisis worker and patient is accepted to [facility] by Dr. Gupta for further management and alcohol detox.  He is stable for transfer.      Procedure  Procedures     aL Dennis PA-C  01/11/25 3043

## 2025-01-11 NOTE — PROGRESS NOTES
Attestation/Supervisory note for RIGO Dennis      The patient is a 65-year-old male presenting to the emergency department for evaluation of alcohol dependence/abuse such depression and anxiety.  The patient states that he gets very upset sometimes and feels short of breath.  He states that he has been to [facility] for inpatient detox at least 4 times in the past 6 months.  He does admit to noncompliance with follow-up care afterward and goes back to drinking.  He states he last drank today.  He denies any suicidal or homicidal ideation but he states that he is just very upset because he is ruining his life with his alcohol.  He denies any headache or visual changes.  No chest pain.  He states that sometimes he feels like his heart is racing or that he is short of breath but he states that this is primarily when he becomes upset and starts crying.  He denies any nausea, vomiting or diarrhea.  No urinary complaints.  No fever or chills.  No cough or congestion.  No hallucinations.  All pertinent positives and negatives are recorded above.  All other systems reviewed and otherwise negative.  Vital signs with mild tachycardia but otherwise within normal limits.  Physical exam with a well-nourished well-developed male with disheveled appearance and poor hygiene but no evidence of acute distress other than intermittent crying.  HEENT exam with dry mucous membranes but otherwise unremarkable.  He has no evidence of airway compromise or respiratory distress.  Abdominal exam is benign.  He does not have any gross motor, neurologic or vascular deficits on exam.  Pulses are equal bilaterally.      EKG with sinus tachycardia 106 bpm, normal axis, normal voltage, normal ST segment, normal T waves      A banana bag and CIWA protocol was ordered      Diagnostic labs with a mildly elevated D-dimer but otherwise unremarkable.      Initial troponin 14.  Repeat trop 16      Initial lactate 1.9      CT angio chest for  pulmonary embolism   Final Result   1. No evidence of pulmonary embolism or other acute cardiopulmonary   abnormality.   2. Moderate coronary artery calcifications.        MACRO:   None        Signed by: Kameron Alejandra 1/11/2025 9:24 PM   Dictation workstation:   [id number]      XR chest 1 view   Final Result   No evidence of acute intrathoracic abnormality.        Signed by: Maulik Gallardo 1/11/2025 7:15 PM   Dictation workstation:   [id number]           The patient does not have any evidence of a STEMI on EKG or cardiac enzymes.  He was initially tachycardic but this did improve somewhat with IV fluid rehydration.  The patient had a mildly elevated D-dimer and a CT angio chest was ordered.  The chest x-ray showed no evidence of acute process.  No evidence of pneumonia or pneumothorax.  No evidence of CHF.  No widening of the mediastinum.  His pulses are equal bilaterally.  The CT angio chest shows no evidence of PE or dissection.  No other acute cardiopulmonary abnormality per the radiology reading.  The patient was started on the CIWA protocol for suspected alcohol withdrawal.  He is well-perfused on exam and has no other evidence of sepsis.  The lactic acid is 1.9.        Crisis intervention was consulted and will attempt placement for inpatient detox.      The patient was accepted at [facility] by Dr. Gupta and transferred for inpatient mental health care and alcohol detox.      Impression/diagnosis:  Alcohol dependence/abuse/withdrawal  Depression, chronic  Dyspnea, intermittent  Anxiety disorder      Critical care time of 14 minutes billed for management of the CIWA protocol and suspected alcohol withdrawal with initiation of the CIWA protocol, initiation of IV banana bag, monitoring patient's telemetry, consultation with the patient regarding his results.  This time excludes time for billable procedures.      critical care time billed for by me is non concurrent with time billed for by PA  Sonny      I personally saw the patient and made/approve the management plan and take responsibility for the patient management.      I independently interpreted the following study (S) EKG and diagnostic labs      I personally discussed the patient's management with the patient and the crisis team      I reviewed the results of the diagnostic labs and diagnostic imaging.  Formal radiology read was completed by the radiologist.      Itzel Diaz MD

## 2025-01-11 NOTE — PROGRESS NOTES
"Social Work Note  EPAT consult placed for 64y/o male who presented to [facility] ED voluntarily with chief complaint of \"possible panic attack\". Patient with extensive hx of alcohol abuse, MDD, panic disorder, and frequent ED visits, mainly related to these issues. Per provider, no SI/HI, and not noted whether patient is seeking alcohol treatment at this time. Consult was placed prior to ED workup results. SW to meet with patient to address any needs at time of medical clearance.   "

## 2025-01-12 LAB — HOLD SPECIMEN: NORMAL

## 2025-01-12 NOTE — ED PROCEDURE NOTE
Procedure  Critical Care    Performed by: Itzel Diaz MD  Authorized by: Itzel Diaz MD    Critical care provider statement:     Critical care time (minutes):  14    Critical care time was exclusive of:  Teaching time and separately billable procedures and treating other patients    Critical care was necessary to treat or prevent imminent or life-threatening deterioration of the following conditions: Alcohol withdrawal.    Critical care was time spent personally by me on the following activities:  Obtaining history from patient or surrogate, ordering and performing treatments and interventions, ordering and review of laboratory studies, ordering and review of radiographic studies, pulse oximetry, re-evaluation of patient's condition, review of old charts, examination of patient, evaluation of patient's response to treatment, discussions with primary provider, discussions with consultants, development of treatment plan with patient or surrogate and blood draw for specimens  Comments:      Critical care time of 14 minutes billed for management of the CIWA protocol and suspected alcohol withdrawal with initiation of the CIWA protocol, initiation of IV banana bag, monitoring patient's telemetry, consultation with the patient regarding his results.  This time excludes time for billable procedures.      critical care time billed for by me is non concurrent with time billed for by RIGO Diaz MD  01/11/25 0475
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
## 2025-01-13 LAB
ATRIAL RATE: 106 BPM
P AXIS: 45 DEGREES
P OFFSET: 179 MS
P ONSET: 132 MS
PR INTERVAL: 162 MS
Q ONSET: 213 MS
QRS COUNT: 18 BEATS
QRS DURATION: 86 MS
QT INTERVAL: 338 MS
QTC CALCULATION(BAZETT): 448 MS
QTC FREDERICIA: 408 MS
R AXIS: 3 DEGREES
T AXIS: 36 DEGREES
T OFFSET: 382 MS
VENTRICULAR RATE: 106 BPM

## 2025-03-22 ENCOUNTER — APPOINTMENT (OUTPATIENT)
Dept: CARDIOLOGY | Facility: HOSPITAL | Age: 65
End: 2025-03-22
Payer: COMMERCIAL

## 2025-03-22 ENCOUNTER — HOSPITAL ENCOUNTER (EMERGENCY)
Facility: HOSPITAL | Age: 65
Discharge: ANOTHER HOSPITAL | End: 2025-03-23
Attending: STUDENT IN AN ORGANIZED HEALTH CARE EDUCATION/TRAINING PROGRAM
Payer: COMMERCIAL

## 2025-03-22 DIAGNOSIS — F10.10 ALCOHOL ABUSE: Primary | ICD-10-CM

## 2025-03-22 DIAGNOSIS — F41.9 ANXIETY: ICD-10-CM

## 2025-03-22 LAB
ALBUMIN SERPL BCP-MCNC: 3.9 G/DL (ref 3.4–5)
ALP SERPL-CCNC: 76 U/L (ref 33–136)
ALT SERPL W P-5'-P-CCNC: 22 U/L (ref 10–52)
ANION GAP SERPL CALCULATED.3IONS-SCNC: 16 MMOL/L (ref 10–20)
APAP SERPL-MCNC: <10 UG/ML
AST SERPL W P-5'-P-CCNC: 23 U/L (ref 9–39)
BASOPHILS # BLD AUTO: 0.06 X10*3/UL (ref 0–0.1)
BASOPHILS NFR BLD AUTO: 0.7 %
BILIRUB SERPL-MCNC: 0.3 MG/DL (ref 0–1.2)
BUN SERPL-MCNC: 23 MG/DL (ref 6–23)
CALCIUM SERPL-MCNC: 8.2 MG/DL (ref 8.6–10.3)
CHLORIDE SERPL-SCNC: 105 MMOL/L (ref 98–107)
CO2 SERPL-SCNC: 20 MMOL/L (ref 21–32)
CREAT SERPL-MCNC: 0.74 MG/DL (ref 0.5–1.3)
EGFRCR SERPLBLD CKD-EPI 2021: >90 ML/MIN/1.73M*2
EOSINOPHIL # BLD AUTO: 0.33 X10*3/UL (ref 0–0.7)
EOSINOPHIL NFR BLD AUTO: 3.7 %
ERYTHROCYTE [DISTWIDTH] IN BLOOD BY AUTOMATED COUNT: 13.5 % (ref 11.5–14.5)
ETHANOL SERPL-MCNC: 99 MG/DL
GLUCOSE SERPL-MCNC: 109 MG/DL (ref 74–99)
HCT VFR BLD AUTO: 35.2 % (ref 41–52)
HGB BLD-MCNC: 12.6 G/DL (ref 13.5–17.5)
IMM GRANULOCYTES # BLD AUTO: 0.06 X10*3/UL (ref 0–0.7)
IMM GRANULOCYTES NFR BLD AUTO: 0.7 % (ref 0–0.9)
LYMPHOCYTES # BLD AUTO: 2.95 X10*3/UL (ref 1.2–4.8)
LYMPHOCYTES NFR BLD AUTO: 33.5 %
MCH RBC QN AUTO: 32.7 PG (ref 26–34)
MCHC RBC AUTO-ENTMCNC: 35.8 G/DL (ref 32–36)
MCV RBC AUTO: 91 FL (ref 80–100)
MONOCYTES # BLD AUTO: 0.55 X10*3/UL (ref 0.1–1)
MONOCYTES NFR BLD AUTO: 6.2 %
NEUTROPHILS # BLD AUTO: 4.86 X10*3/UL (ref 1.2–7.7)
NEUTROPHILS NFR BLD AUTO: 55.2 %
NRBC BLD-RTO: 0 /100 WBCS (ref 0–0)
PLATELET # BLD AUTO: 230 X10*3/UL (ref 150–450)
POTASSIUM SERPL-SCNC: 4 MMOL/L (ref 3.5–5.3)
PROT SERPL-MCNC: 6.6 G/DL (ref 6.4–8.2)
RBC # BLD AUTO: 3.85 X10*6/UL (ref 4.5–5.9)
SALICYLATES SERPL-MCNC: <3 MG/DL
SODIUM SERPL-SCNC: 137 MMOL/L (ref 136–145)
WBC # BLD AUTO: 8.8 X10*3/UL (ref 4.4–11.3)

## 2025-03-22 PROCEDURE — 96361 HYDRATE IV INFUSION ADD-ON: CPT

## 2025-03-22 PROCEDURE — 93005 ELECTROCARDIOGRAM TRACING: CPT

## 2025-03-22 PROCEDURE — 80320 DRUG SCREEN QUANTALCOHOLS: CPT | Performed by: STUDENT IN AN ORGANIZED HEALTH CARE EDUCATION/TRAINING PROGRAM

## 2025-03-22 PROCEDURE — 36415 COLL VENOUS BLD VENIPUNCTURE: CPT | Performed by: STUDENT IN AN ORGANIZED HEALTH CARE EDUCATION/TRAINING PROGRAM

## 2025-03-22 PROCEDURE — 80053 COMPREHEN METABOLIC PANEL: CPT | Performed by: STUDENT IN AN ORGANIZED HEALTH CARE EDUCATION/TRAINING PROGRAM

## 2025-03-22 PROCEDURE — 85025 COMPLETE CBC W/AUTO DIFF WBC: CPT | Performed by: STUDENT IN AN ORGANIZED HEALTH CARE EDUCATION/TRAINING PROGRAM

## 2025-03-22 PROCEDURE — 2500000001 HC RX 250 WO HCPCS SELF ADMINISTERED DRUGS (ALT 637 FOR MEDICARE OP): Performed by: STUDENT IN AN ORGANIZED HEALTH CARE EDUCATION/TRAINING PROGRAM

## 2025-03-22 PROCEDURE — 99285 EMERGENCY DEPT VISIT HI MDM: CPT | Mod: 25 | Performed by: STUDENT IN AN ORGANIZED HEALTH CARE EDUCATION/TRAINING PROGRAM

## 2025-03-22 PROCEDURE — 96374 THER/PROPH/DIAG INJ IV PUSH: CPT

## 2025-03-22 PROCEDURE — 2500000004 HC RX 250 GENERAL PHARMACY W/ HCPCS (ALT 636 FOR OP/ED): Performed by: STUDENT IN AN ORGANIZED HEALTH CARE EDUCATION/TRAINING PROGRAM

## 2025-03-22 RX ORDER — LORAZEPAM 1 MG/1
1 TABLET ORAL EVERY 2 HOUR PRN
Status: DISCONTINUED | OUTPATIENT
Start: 2025-03-22 | End: 2025-03-23 | Stop reason: HOSPADM

## 2025-03-22 RX ORDER — MULTIVIT-MIN/IRON FUM/FOLIC AC 7.5 MG-4
1 TABLET ORAL NIGHTLY
Status: DISCONTINUED | OUTPATIENT
Start: 2025-03-22 | End: 2025-03-23 | Stop reason: HOSPADM

## 2025-03-22 RX ORDER — LANOLIN ALCOHOL/MO/W.PET/CERES
100 CREAM (GRAM) TOPICAL NIGHTLY
Status: DISCONTINUED | OUTPATIENT
Start: 2025-03-25 | End: 2025-03-23 | Stop reason: HOSPADM

## 2025-03-22 RX ORDER — LORAZEPAM 0.5 MG/1
0.5 TABLET ORAL EVERY 2 HOUR PRN
Status: DISCONTINUED | OUTPATIENT
Start: 2025-03-22 | End: 2025-03-23 | Stop reason: HOSPADM

## 2025-03-22 RX ORDER — LORAZEPAM 1 MG/1
2 TABLET ORAL EVERY 2 HOUR PRN
Status: DISCONTINUED | OUTPATIENT
Start: 2025-03-22 | End: 2025-03-23 | Stop reason: HOSPADM

## 2025-03-22 RX ORDER — FOLIC ACID 1 MG/1
1 TABLET ORAL EVERY 24 HOURS
Status: DISCONTINUED | OUTPATIENT
Start: 2025-03-22 | End: 2025-03-23 | Stop reason: HOSPADM

## 2025-03-22 RX ORDER — THIAMINE HYDROCHLORIDE 100 MG/ML
100 INJECTION, SOLUTION INTRAMUSCULAR; INTRAVENOUS NIGHTLY
Status: DISCONTINUED | OUTPATIENT
Start: 2025-03-22 | End: 2025-03-23 | Stop reason: HOSPADM

## 2025-03-22 RX ADMIN — THIAMINE HYDROCHLORIDE 100 MG: 100 INJECTION, SOLUTION INTRAMUSCULAR; INTRAVENOUS at 22:51

## 2025-03-22 RX ADMIN — Medication 1 TABLET: at 22:51

## 2025-03-22 RX ADMIN — LORAZEPAM 0.5 MG: 0.5 TABLET ORAL at 22:51

## 2025-03-22 RX ADMIN — SODIUM CHLORIDE, POTASSIUM CHLORIDE, SODIUM LACTATE AND CALCIUM CHLORIDE 1000 ML: 600; 310; 30; 20 INJECTION, SOLUTION INTRAVENOUS at 23:15

## 2025-03-22 RX ADMIN — FOLIC ACID 1 MG: 1 TABLET ORAL at 22:51

## 2025-03-22 SDOH — HEALTH STABILITY: MENTAL HEALTH: BEHAVIORS/MOOD: ANXIOUS

## 2025-03-22 SDOH — HEALTH STABILITY: MENTAL HEALTH: BEHAVIORAL HEALTH(WDL): EXCEPTIONS TO WDL

## 2025-03-22 ASSESSMENT — LIFESTYLE VARIABLES
HAVE PEOPLE ANNOYED YOU BY CRITICIZING YOUR DRINKING: NO
AGITATION: NORMAL ACTIVITY
EVER HAD A DRINK FIRST THING IN THE MORNING TO STEADY YOUR NERVES TO GET RID OF A HANGOVER: NO
NAUSEA AND VOMITING: NO NAUSEA AND NO VOMITING
TOTAL SCORE: 6
AUDITORY DISTURBANCES: NOT PRESENT
TREMOR: NOT VISIBLE, BUT CAN BE FELT FINGERTIP TO FINGERTIP
BLOOD PRESSURE: 149/93
VISUAL DISTURBANCES: NOT PRESENT
PULSE: 103
TOTAL SCORE: 0
PAROXYSMAL SWEATS: BARELY PERCEPTIBLE SWEATING, PALMS MOIST
HAVE YOU EVER FELT YOU SHOULD CUT DOWN ON YOUR DRINKING: NO
EVER FELT BAD OR GUILTY ABOUT YOUR DRINKING: NO
ORIENTATION AND CLOUDING OF SENSORIUM: ORIENTED AND CAN DO SERIAL ADDITIONS
ANXIETY: MODERATELY ANXIOUS, OR GUARDED, SO ANXIETY IS INFERRED
HEADACHE, FULLNESS IN HEAD: NOT PRESENT

## 2025-03-22 ASSESSMENT — PAIN SCALES - GENERAL
PAINLEVEL_OUTOF10: 0 - NO PAIN
PAINLEVEL_OUTOF10: 0 - NO PAIN

## 2025-03-22 ASSESSMENT — PAIN - FUNCTIONAL ASSESSMENT: PAIN_FUNCTIONAL_ASSESSMENT: 0-10

## 2025-03-23 VITALS
TEMPERATURE: 97.3 F | OXYGEN SATURATION: 98 % | RESPIRATION RATE: 16 BRPM | BODY MASS INDEX: 24.38 KG/M2 | SYSTOLIC BLOOD PRESSURE: 152 MMHG | HEIGHT: 72 IN | WEIGHT: 180 LBS | DIASTOLIC BLOOD PRESSURE: 97 MMHG | HEART RATE: 78 BPM

## 2025-03-23 LAB
AMPHETAMINES UR QL SCN: NORMAL
APPEARANCE UR: CLEAR
BARBITURATES UR QL SCN: NORMAL
BENZODIAZ UR QL SCN: NORMAL
BILIRUB UR STRIP.AUTO-MCNC: NEGATIVE MG/DL
BZE UR QL SCN: NORMAL
CANNABINOIDS UR QL SCN: NORMAL
COLOR UR: NORMAL
FENTANYL+NORFENTANYL UR QL SCN: NORMAL
GLUCOSE UR STRIP.AUTO-MCNC: NORMAL MG/DL
KETONES UR STRIP.AUTO-MCNC: NEGATIVE MG/DL
LEUKOCYTE ESTERASE UR QL STRIP.AUTO: NEGATIVE
METHADONE UR QL SCN: NORMAL
NITRITE UR QL STRIP.AUTO: NEGATIVE
OPIATES UR QL SCN: NORMAL
OXYCODONE+OXYMORPHONE UR QL SCN: NORMAL
PCP UR QL SCN: NORMAL
PH UR STRIP.AUTO: 5 [PH]
PROT UR STRIP.AUTO-MCNC: NEGATIVE MG/DL
RBC # UR STRIP.AUTO: NEGATIVE MG/DL
SP GR UR STRIP.AUTO: 1.02
UROBILINOGEN UR STRIP.AUTO-MCNC: NORMAL MG/DL

## 2025-03-23 PROCEDURE — 80307 DRUG TEST PRSMV CHEM ANLYZR: CPT | Performed by: STUDENT IN AN ORGANIZED HEALTH CARE EDUCATION/TRAINING PROGRAM

## 2025-03-23 PROCEDURE — 90839 PSYTX CRISIS INITIAL 60 MIN: CPT

## 2025-03-23 PROCEDURE — 2500000001 HC RX 250 WO HCPCS SELF ADMINISTERED DRUGS (ALT 637 FOR MEDICARE OP): Performed by: STUDENT IN AN ORGANIZED HEALTH CARE EDUCATION/TRAINING PROGRAM

## 2025-03-23 PROCEDURE — 81003 URINALYSIS AUTO W/O SCOPE: CPT | Mod: 59 | Performed by: STUDENT IN AN ORGANIZED HEALTH CARE EDUCATION/TRAINING PROGRAM

## 2025-03-23 RX ADMIN — LORAZEPAM 0.5 MG: 0.5 TABLET ORAL at 03:09

## 2025-03-23 SDOH — HEALTH STABILITY: MENTAL HEALTH: IN THE PAST FEW WEEKS, HAVE YOU FELT THAT YOU OR YOUR FAMILY WOULD BE BETTER OFF IF YOU WERE DEAD?: NO

## 2025-03-23 SDOH — HEALTH STABILITY: MENTAL HEALTH: HAVE YOU EVER TRIED TO KILL YOURSELF?: NO

## 2025-03-23 SDOH — HEALTH STABILITY: MENTAL HEALTH: SUICIDAL BEHAVIOR (LIFETIME): NO

## 2025-03-23 SDOH — ECONOMIC STABILITY: HOUSING INSECURITY: FEELS SAFE LIVING IN HOME: OTHER (COMMENT)

## 2025-03-23 SDOH — HEALTH STABILITY: MENTAL HEALTH: ARE YOU HAVING THOUGHTS OF KILLING YOURSELF RIGHT NOW?: NO

## 2025-03-23 SDOH — HEALTH STABILITY: MENTAL HEALTH: ANXIETY SYMPTOMS: GENERALIZED

## 2025-03-23 SDOH — HEALTH STABILITY: MENTAL HEALTH: DEPRESSION SYMPTOMS: NO PROBLEMS REPORTED OR OBSERVED.

## 2025-03-23 SDOH — HEALTH STABILITY: MENTAL HEALTH: WISH TO BE DEAD (PAST 1 MONTH): NO

## 2025-03-23 SDOH — HEALTH STABILITY: MENTAL HEALTH: IN THE PAST WEEK, HAVE YOU BEEN HAVING THOUGHTS ABOUT KILLING YOURSELF?: NO

## 2025-03-23 SDOH — HEALTH STABILITY: MENTAL HEALTH: IN THE PAST FEW WEEKS, HAVE YOU WISHED YOU WERE DEAD?: NO

## 2025-03-23 SDOH — HEALTH STABILITY: MENTAL HEALTH: NON-SPECIFIC ACTIVE SUICIDAL THOUGHTS (PAST 1 MONTH): NO

## 2025-03-23 ASSESSMENT — LIFESTYLE VARIABLES
HEADACHE, FULLNESS IN HEAD: NOT PRESENT
ANXIETY: MILDLY ANXIOUS
NAUSEA AND VOMITING: MILD NAUSEA WITH NO VOMITING
VISUAL DISTURBANCES: NOT PRESENT
TREMOR: NO TREMOR
PULSE: 86
NAUSEA AND VOMITING: NO NAUSEA AND NO VOMITING
ANXIETY: MILDLY ANXIOUS
HEADACHE, FULLNESS IN HEAD: NOT PRESENT
BLOOD PRESSURE: 150/100
PRESCIPTION_ABUSE_PAST_12_MONTHS: NO
ANXIETY: NO ANXIETY, AT EASE
HEADACHE, FULLNESS IN HEAD: NOT PRESENT
AGITATION: NORMAL ACTIVITY
PULSE: 90
NAUSEA AND VOMITING: MILD NAUSEA WITH NO VOMITING
PAROXYSMAL SWEATS: BARELY PERCEPTIBLE SWEATING, PALMS MOIST
PAROXYSMAL SWEATS: NO SWEAT VISIBLE
TOTAL SCORE: 2
SUBSTANCE_ABUSE_PAST_12_MONTHS: YES
TOTAL SCORE: 2
AUDITORY DISTURBANCES: NOT PRESENT
TREMOR: NOT VISIBLE, BUT CAN BE FELT FINGERTIP TO FINGERTIP
HEADACHE, FULLNESS IN HEAD: NOT PRESENT
AGITATION: NORMAL ACTIVITY
VISUAL DISTURBANCES: NOT PRESENT
NAUSEA AND VOMITING: NO NAUSEA AND NO VOMITING
ORIENTATION AND CLOUDING OF SENSORIUM: ORIENTED AND CAN DO SERIAL ADDITIONS
AGITATION: NORMAL ACTIVITY
TREMOR: NO TREMOR
PULSE: 84
TOTAL SCORE: 4
AUDITORY DISTURBANCES: NOT PRESENT
PAROXYSMAL SWEATS: BARELY PERCEPTIBLE SWEATING, PALMS MOIST
TOTAL SCORE: 0
ORIENTATION AND CLOUDING OF SENSORIUM: ORIENTED AND CAN DO SERIAL ADDITIONS
ORIENTATION AND CLOUDING OF SENSORIUM: ORIENTED AND CAN DO SERIAL ADDITIONS
VISUAL DISTURBANCES: NOT PRESENT
VISUAL DISTURBANCES: NOT PRESENT
AUDITORY DISTURBANCES: NOT PRESENT
BLOOD PRESSURE: 170/95
ORIENTATION AND CLOUDING OF SENSORIUM: ORIENTED AND CAN DO SERIAL ADDITIONS
ANXIETY: NO ANXIETY, AT EASE
AUDITORY DISTURBANCES: NOT PRESENT
TREMOR: NOT VISIBLE, BUT CAN BE FELT FINGERTIP TO FINGERTIP
AGITATION: NORMAL ACTIVITY
PAROXYSMAL SWEATS: NO SWEAT VISIBLE
BLOOD PRESSURE: 109/65

## 2025-03-23 NOTE — PROGRESS NOTES
EPAT - Social Work Psychiatric Assessment    Arrival Details  Mode of Arrival: Ambulatory  Admission Source: Other (Comment)  Admission Type: Voluntary  EPAT Assessment Start Date: 03/23/25  EPAT Assessment Start Time: 0820  Name of : EL Guillory    History of Present Illness  Admission Reason: Detoxification Therapy  HPI: Pt is a 64 y/o M brought in by members at AA meeting for help with his alcohol use. Pt reports he has been binge drinking for past two weeks and has extensive history with alcohol use. He was showing a BAL of .099 upon arrival.  Pt denies any concerns for mental health at this time but remains interested in inpatient detox. Social work reviewed Pts chart, medical record, and provider notes which indicate history of MDD, MINDA and alcohol abuse. Pt has been to inpatient treatment in the past and was connected with outpatient providers. Pt is compliant with current medications for mental health. The triage risk assessment was reviewed and Pt was inidcated to be no risk during triage assessment.    SW Readmission Information   Readmission within 30 Days: No    Psychiatric Symptoms  Anxiety Symptoms: Generalized  Depression Symptoms: No problems reported or observed.  Flaca Symptoms: No problems reported or observed.    Psychosis Symptoms  Hallucination Type: No problems reported or observed.  Delusion Type: No problems reported or observed.    Additional Symptoms - Adult  Generalized Anxiety Disorder: No problems reported or observed.  Obsessive Compulsive Disorder: No problems reported or observed.  Panic Attack: No problems reported or observed.  Post Traumatic Stress Disorder: No problems reported or observed.  Delirium: No problems reported or observed.    Past Psychiatric History/Meds/Treatments  Past Psychiatric History: Pt has history of depression, anxiety and alcohol abuse. He is currently prescribed Pristiq once in the morning, Clonidine 3 times a day, Remeron once in the  evening, and Vistaril as needed.  Past Psychiatric Meds/Treatments: Pt has been to [facility] four times within the past year. Most recent admission was 1/11/25 per records. Pt has history with outpatient providers at [facility] but has not followed up for at least a month. Pt has been prescribed Abilify, Lamictal and Hydroxyzine in the past as well.    Current Mental Health Contacts  Provider Name/Phone Number: [facility]- unknown provider at this time.  Provider Last Appointment Date: Unknown. Pt reports it has been over a month.    Support System: Immediate family, Friends    Living Arrangement: Homeless (Was staying with his sister until 2 weeks ago. Has been bouncing between places of friends or in his car.)    Home Safety  Feels Safe Living in Home: Other (Comment)  Home Safety : Currently homeless. Kicked out of sister's home 2 weeks ago due to his alcohol use.    Income Information  Employment Status for: Patient  Employment Status: Disabled  Income Source: Social Security    [employer]/Education History  Current or Previous  Service: None  History of Learning Problems: No  History of School Behavior Problems: No    Social/Cultural History  Cultural Requests During Hospitalization: None  Spiritual Requests During Hospitalization: None    Legal  Legal Considerations: Patient/ Family Ability to Make Healthcare Decisions  Assistance with Managing/Advocating Healthcare Needs: Other (Comment)  Criminal Activity/ Legal Involvement Pertinent to Current Situation/ Hospitalization: None  Legal Concerns: Pt was in custodial for 45 days due to probation violation related to alcohol use and public intoxication. Was released form custodial on 3/11/25  Legal Comments: Legal Guardian: Self  POA: None Payee: None    Drug Screening  Have you used any substances (canabis, cocaine, heroin, hallucinogens, inhalants, etc.) in the past 12 months?: Yes  Have you used any prescription drugs other than prescribed in the past 12  "months?: No  Is a toxicology screen needed?: Yes    Stage of Change  Stage of Change: Contemplation  History of Treatment: Inpatient, AA/NA meetings  Type of Treatment Offered: Inpatient  Treatment Offered: Accepted  Duration of Substance Use: Type: Alcohol, marijuana  Last Use: last night around 1830 Withdrawal Symptoms: nausea, high anxiety and \"feeling like equillibrium is off\". Pt denies any history of seizures or DTs  Frequency of Substance Use: How much: 2 pints of vodka plus beer. Reports sometimes having more if he can get it.  How Often: daily  Age of First Substance Use: Unknonw. Pt reports drinking for \"many years\".    Behavioral Health  Behavioral Health(WDL): Exceptions to WDL  Behaviors/Mood: Anxious, Appropriate for situation, Calm, Cooperative  Affect: Appropriate to circumstances  Parent/Guardian/Significant Other Involvement: No involvement  Needs Expressed: Denies  Emotional Support Given: Reassure    Orientation  Orientation Level: Oriented X4    General Appearance  Motor Activity: Unremarkable  Speech Pattern:  (Linear)  General Attitude: Cooperative  Appearance/Hygiene: Unremarkable    Thought Process  Coherency: [place] thinking  Content: Unremarkable  Delusions:  (None)  Perception: Not altered  Hallucination: None  Judgment/Insight: Limited  Confusion: None  Cognition: Appropriate safety awareness, Appropriate attention/concentration    Sleep Pattern  Sleep Pattern: Sleeps all night    Risk Factors  Self Harm/Suicidal Ideation Plan: Pt denies any SI thoughts, plan or intent.  Previous Self Harm/Suicidal Plans: Pt denies any history of SI or attempts.  Risk Factors: Male, Substance abuse    Violence Risk Assessment  Assessment of Violence: None noted  Thoughts of Harm to Others: No    Ability to Assess Risk Screen  Risk Screen - Ability to Assess: Able to be screened  Ask Suicide-Screening Questions  1. In the past few weeks, have you wished you were dead?: No  2. In the past few weeks, have " "you felt that you or your family would be better off if you were dead?: No  3. In the past week, have you been having thoughts about killing yourself?: No  4. Have you ever tried to kill yourself?: No  5. Are you having thoughts of killing yourself right now?: No  Calculated Risk Score: No intervention is necessary  [place]arton Suicide Severity Rating Scale (Screener/Recent Self-Report)  1. Wish to be Dead (Past 1 Month): No  2. Non-Specific Active Suicidal Thoughts (Past 1 Month): No  6. Suicidal Behavior (Lifetime): No  Calculated C-SSRS Risk Score (Lifetime/Recent): No Risk Indicated  Step 1: Risk Factors  Current & Past Psychiatric Dx: Mood disorder, Alcohol/substance abuse disorders  Presenting Symptoms: Anxiety and/or panic  Precipitants/Stressors: Substance intoxication or withdrawal  Change in Treatment: Other (Comment) (Compliant with medications, unknown if pt still connected to [facility] providers at this time.)  Access to Lethal Methods : No  Step 2: Protective Factors   Protective Factors Internal: Identifies reasons for living  Protective Factors External: Supportive social network or family or friends  Step 3: Suicidal Ideation Intensity  Are There Things - Anyone or Anything - That Stopped You From Wanting to Die or Acting on: Does not apply  What Sort of Reasons Did You Have For Thinking About Wanting to Die or Killing Yourself: Does not apply  Step 5: Documentation  Risk Level: Other (Comment) (No Risk)    Psychiatric Impression and Plan of Care  Assessment and Plan: Upon assessment Pt presents calm and cooperative. He endorses nausea and \"feeling his equillibrium is off\" as he has been doing \"a lot of walking\". He denies any other withdrawal at this time minus some anxiety. Pt remains interested in inpatient detox stating he \"needs to get help\". Pt discussed how he was in skilled nursing for the last 45 days only getting out a couple weeks ago. He was sent in due to violating his probation related to public " "intoxication. Pt states he \"believes he spoke with his [facility] provider\" while at [facility] but is unsure. He admits he was suppose to call and set up another appointment once he was released form [facility] but did not make the call. Pt states he was kicked out of his sister's house after getting out of [facility] because he was unable to remain sober. He has since been \"crashing with friends\" or sleeping in his car. He states \"that is not the reason I am here today\" and continues to endorse wanting help with his alcohol. He states the longest he has maintained sobriety was \"maybe a few days\". He has been drinking heavily for the past 2 weeks with 2 pints of vodka per day \"sometimes adding a couple beers too\". He is compiant with his mental health medications although understands he should not be mixing them with alcohol. Pt denoies any SI,HI,AVH and denies any seizure activity or history of DTs. Pt remains interested in detox at this time and is calm and cooperative with staff.  Pt presents with no risk at time of social work assessment.  Specific Resources Provided to Patient: Peer support: Not available for assessment.  CM Notified: Yes[facility]  Plan Comments: Diagnostic Impression: Alcohol Abuse  Plan: Inpatient detoxification Therapy.    Outcome/Disposition  Patient's Perception of Outcome Achieved: Pt agreeable to treatment plan.  Assessment, Recommendations and Risk Level Reviewed with: Reviewed case with Dr. Salvador and FRANCIS Arroyo  EPAT Assessment Completed Date: 03/23/25  EPAT Assessment Completed Time: 0840  Patient Disposition: Out of network facility (Specify)  Out of Network Reason: Patient requires dual diagnosis treatment  Disposition: 3/23/25 @1100- Accepted to [facility] by Dr. Hubbard and [facility] for [address]00 unit can be called to [phone] however [facility] requesting ED to wait as they do not have an available bed at this time until discharges leave. Will call once a bed is available for pt to be " transported.

## 2025-03-23 NOTE — CARE PLAN
"SW met with pt at bedside for detox consult. Pt presents to [facility] for detox for ETOH. Pt is reporting 11 days binge drinking.  Pt stated that he has been consuming 2 pints of vodka a day for the past 11 days.  Pt reports that he had been consuming about a pint for a \"long time\".  Pt states that he has only been able to stay sober about 3 days at a time. Pt reports that he has experienced withdrawal symptoms of shaking and nausea. Pt is currently motivated for treatment and obtaining sobriety.   "

## 2025-03-23 NOTE — ED PROVIDER NOTES
HPI   Chief Complaint   Patient presents with    Detox       Patient is a 65-year-old male that presents emergency room for evaluation of alcohol detox.  He has a long history of drinking vodka and beer for many years.  He has been drinking heavily for the last several weeks with last drink being at 630 this evening.  He typically drinks 2 pints of vodka daily.  He states that he realizes that he needs help trying to quit and was brought in by people from [facility] to get help.  He denies chest pain, shortness of breath, abdominal pain, nausea, vomiting with fevers or chills.      History provided by:  Patient          Patient History   Past Medical History:   Diagnosis Date    Alcohol use     Depression     Hypertension      No past surgical history on file.  Family History   Problem Relation Name Age of Onset    Hypertension Other      Heart disease Other       Social History     Tobacco Use    Smoking status: Every Day     Types: Cigarettes    Smokeless tobacco: Not on file   Substance Use Topics    Alcohol use: Yes     Comment: 3 pints of vodka a day    Drug use: Yes     Types: Marijuana       Physical Exam   ED Triage Vitals   Temperature Heart Rate Respirations BP   03/22/25 2233 03/22/25 2233 03/22/25 2233 03/22/25 2233   36.6 °C (97.9 °F) (!) 108 (!) 22 (!) 148/101      Pulse Ox Temp Source Heart Rate Source Patient Position   03/22/25 2237 03/22/25 2233 03/22/25 2233 --   97 % Temporal Monitor       BP Location FiO2 (%)     -- --             Physical Exam  Vitals and nursing note reviewed.   Constitutional:       General: He is not in acute distress.     Appearance: Normal appearance. He is ill-appearing.   HENT:      Head: Normocephalic and atraumatic.      Mouth/Throat:      Mouth: Mucous membranes are moist.   Eyes:      General: No scleral icterus.     Extraocular Movements: Extraocular movements intact.      Pupils: Pupils are equal, round, and reactive to light.   Cardiovascular:      Rate and  Rhythm: Regular rhythm. Tachycardia present.      Pulses: Normal pulses.   Pulmonary:      Effort: Pulmonary effort is normal. No respiratory distress.      Breath sounds: No wheezing or rhonchi.   Musculoskeletal:      Right lower leg: No edema.      Left lower leg: No edema.   Skin:     General: Skin is warm and dry.      Coloration: Skin is not jaundiced.   Neurological:      General: No focal deficit present.      Mental Status: He is alert and oriented to person, place, and time.   Psychiatric:      Comments: Anxious appearing       Recent Results (from the past 24 hours)   CBC and Auto Differential    Collection Time: 03/22/25 10:55 PM   Result Value Ref Range    WBC 8.8 4.4 - 11.3 x10*3/uL    nRBC 0.0 0.0 - 0.0 /100 WBCs    RBC 3.85 (L) 4.50 - 5.90 x10*6/uL    Hemoglobin 12.6 (L) 13.5 - 17.5 g/dL    Hematocrit 35.2 (L) 41.0 - 52.0 %    MCV 91 80 - 100 fL    MCH 32.7 26.0 - 34.0 pg    MCHC 35.8 32.0 - 36.0 g/dL    RDW 13.5 11.5 - 14.5 %    Platelets 230 150 - 450 x10*3/uL    Neutrophils % 55.2 40.0 - 80.0 %    Immature Granulocytes %, Automated 0.7 0.0 - 0.9 %    Lymphocytes % 33.5 13.0 - 44.0 %    Monocytes % 6.2 2.0 - 10.0 %    Eosinophils % 3.7 0.0 - 6.0 %    Basophils % 0.7 0.0 - 2.0 %    Neutrophils Absolute 4.86 1.20 - 7.70 x10*3/uL    Immature Granulocytes Absolute, Automated 0.06 0.00 - 0.70 x10*3/uL    Lymphocytes Absolute 2.95 1.20 - 4.80 x10*3/uL    Monocytes Absolute 0.55 0.10 - 1.00 x10*3/uL    Eosinophils Absolute 0.33 0.00 - 0.70 x10*3/uL    Basophils Absolute 0.06 0.00 - 0.10 x10*3/uL   Comprehensive Metabolic Panel    Collection Time: 03/22/25 10:55 PM   Result Value Ref Range    Glucose 109 (H) 74 - 99 mg/dL    Sodium 137 136 - 145 mmol/L    Potassium 4.0 3.5 - 5.3 mmol/L    Chloride 105 98 - 107 mmol/L    Bicarbonate 20 (L) 21 - 32 mmol/L    Anion Gap 16 10 - 20 mmol/L    Urea Nitrogen 23 6 - 23 mg/dL    Creatinine 0.74 0.50 - 1.30 mg/dL    eGFR >90 >60 mL/min/1.73m*2    Calcium 8.2 (L) 8.6  - 10.3 mg/dL    Albumin 3.9 3.4 - 5.0 g/dL    Alkaline Phosphatase 76 33 - 136 U/L    Total Protein 6.6 6.4 - 8.2 g/dL    AST 23 9 - 39 U/L    Bilirubin, Total 0.3 0.0 - 1.2 mg/dL    ALT 22 10 - 52 U/L   Acute Toxicology Panel, Blood    Collection Time: 03/22/25 10:55 PM   Result Value Ref Range    Acetaminophen <10.0 10.0 - 30.0 ug/mL    Salicylate  <3 4 - 20 mg/dL    Alcohol 99 (H) <=10 mg/dL   Drug Screen, Urine    Collection Time: 03/23/25  2:05 AM   Result Value Ref Range    Amphetamine Screen, Urine Presumptive Negative Presumptive Negative    Barbiturate Screen, Urine Presumptive Negative Presumptive Negative    Benzodiazepines Screen, Urine Presumptive Negative Presumptive Negative    Cannabinoid Screen, Urine Presumptive Negative Presumptive Negative    Cocaine Metabolite Screen, Urine Presumptive Negative Presumptive Negative    Fentanyl Screen, Urine Presumptive Negative Presumptive Negative    Opiate Screen, Urine Presumptive Negative Presumptive Negative    Oxycodone Screen, Urine Presumptive Negative Presumptive Negative    PCP Screen, Urine Presumptive Negative Presumptive Negative    Methadone Screen, Urine Presumptive Negative Presumptive Negative   Urinalysis with Reflex Culture and Microscopic    Collection Time: 03/23/25  2:06 AM   Result Value Ref Range    Color, Urine Light-Yellow Light-Yellow, Yellow, Dark-Yellow    Appearance, Urine Clear Clear    Specific Gravity, Urine 1.021 1.005 - 1.035    pH, Urine 5.0 5.0, 5.5, 6.0, 6.5, 7.0, 7.5, 8.0    Protein, Urine NEGATIVE NEGATIVE, 10 (TRACE), 20 (TRACE) mg/dL    Glucose, Urine Normal Normal mg/dL    Blood, Urine NEGATIVE NEGATIVE mg/dL    Ketones, Urine NEGATIVE NEGATIVE mg/dL    Bilirubin, Urine NEGATIVE NEGATIVE mg/dL    Urobilinogen, Urine Normal Normal mg/dL    Nitrite, Urine NEGATIVE NEGATIVE    Leukocyte Esterase, Urine NEGATIVE NEGATIVE         ED Course & MDM   ED Course as of 03/23/25 0544   Sat Mar 22, 2025   2241 EKG Time:2241  EKG  Interpretation time:2241  EKG Interpretation: EKG shows sinus tachycardia with a rate of 103 bpm, normal axis, QTc 440, no evidence of STEMI.    EKG was interpreted by myself independently [JL]      ED Course User Index  [JL] Julius Oro DO         Diagnoses as of 03/23/25 0544   Alcohol abuse   Anxiety                 No data recorded     [place] Coma Scale Score: 15 (03/22/25 2237 : Naomi Judge RN)                           Medical Decision Making  Patient is a 65-year-old male that presented to the emergency room for evaluation of alcohol detox.  Patient very uncomfortable appearing but in no obvious distress on exam.  He is mildly tachycardic but remainder vital signs are stable.  Blood work ordered for medical screening purposes including CBC, CMP, serum toxicology, urine toxicology, urinalysis.  EKG shows sinus tachycardia but no evidence of STEMI.  Patient given IV fluids and started on CIWA protocol at this time.  Blood work was unremarkable with only mildly elevated alcohol level of 99.  Patient does have normal electrolytes, normal kidney function.  Patient medically cleared at this time and is being evaluated for inpatient detox.  Case signed out to oncoming physician pending placement for alcohol detox.    Patient was seen by both myself and advanced practitioner.  I personally saw the patient and made/approved the management plan and take responsibility for the patient management     I independently interpreted the following study(s) EKG which show sinus tachycardia but no evidence of STEMI or active ischemia.          Procedure  Procedures     Julius Oro DO  03/23/25 0544

## 2025-03-23 NOTE — ED TRIAGE NOTES
Pt presents ambulatory through triage requesting help with detoxing from etoh. Pt states he drinks approx 2 pints of vodka a day. Pt drank 2 pints of vodka and 2 beers today. Pt's last drink was at 1830

## 2025-03-23 NOTE — ED PROVIDER NOTES
Patient signed out to me by off going provider, Dr. Julius Oro, at 3:57 PM in stable condition.    IN BRIEF:  65 y.o.male // pmhx EtOH use/dependency // p/w requesting EtOH detox  -EtOH 99  -Placed on CIWA  -Received Ativan and folic acid  -Medically cleared for detox placement    BP (!) 152/97   Pulse 78   Temp 36.3 °C (97.3 °F)   Resp 16   Ht 1.829 m (6')   Wt 81.6 kg (180 lb)   SpO2 98%   BMI 24.41 kg/m²     ED Course as of 03/24/25 1557   Sat Mar 22, 2025   2241 EKG Time:2241  EKG Interpretation time:2241  EKG Interpretation: EKG shows sinus tachycardia with a rate of 103 bpm, normal axis, QTc 440, no evidence of STEMI.    EKG was interpreted by myself independently [JL]      ED Course User Index  [JL] Julius Oro, DO         Diagnoses as of 03/24/25 1557   Alcohol abuse   Anxiety       Remaining work up:  Reassessment, Sober re-eval, and Recommendations [facility] ([facility]) ED    Likely disposition detox placement/transfer with alternative discharge home.    Physical Exam  Vitals and nursing note reviewed.   Constitutional:       Appearance: Normal appearance. He is normal weight.   HENT:      Mouth/Throat:      Mouth: Mucous membranes are moist.   Eyes:      Pupils: Pupils are equal, round, and reactive to light.   Cardiovascular:      Rate and Rhythm: Normal rate and regular rhythm.      Pulses: Normal pulses.   Pulmonary:      Effort: Pulmonary effort is normal.      Breath sounds: Normal breath sounds.   Skin:     General: Skin is warm and dry.   Neurological:      General: No focal deficit present.      Mental Status: He is alert and oriented to person, place, and time.         TRANSITION of CARE INTERVAL:  Patient was under my direct clinical supervision and on reassessment patient did not endorse any new or significantly worsening symptoms and demonstrated no evidence of decompensation. I did not provide any critical/noncritical direct medical care or interventions and patient remained clinically  stable while under my clinical supervision.        FINAL IMPRESSION:  1. Alcohol abuse        2. Anxiety              FINAL DISPOSITION:  Transfer to detox center       Harsh Salvador MD  03/24/25 8165

## 2025-03-23 NOTE — ED NOTES
Pt 83 % on room air while asleep and snoring.  Pt placed on 3 L NC and inceased to 97 % on 3 L       Isabelle Lazar RN  03/23/25 1299

## 2025-03-23 NOTE — ED PROVIDER NOTES
HPI   Chief Complaint   Patient presents with    Detox       HPI  Patient is a 65-year-old male history of alcohol abuse, anxiety, depression, hypertension presenting for alcohol detox.  Patient states that he is drinking approximately 3 pints of vodka per day.  States he went to an alcohol Anonymous meeting today and someone advised him to come to ER for alcohol detox.  Patient does endorse occasional marijuana use but otherwise denies drug use.  States he does get very anxious when he does not have alcohol but otherwise denies history of seizures.  His last drink was at approximately 6:30 PM today.  Patient is known to have a poor living situation and is well-known to the ER for similar complaints.      Patient History   Past Medical History:   Diagnosis Date    Alcohol use     Depression     Hypertension      No past surgical history on file.  Family History   Problem Relation Name Age of Onset    Hypertension Other      Heart disease Other       Social History     Tobacco Use    Smoking status: Every Day     Types: Cigarettes    Smokeless tobacco: Not on file   Substance Use Topics    Alcohol use: Yes     Comment: 3 pints of vodka a day    Drug use: Yes     Types: Marijuana       Physical Exam   ED Triage Vitals   Temperature Heart Rate Respirations BP   03/22/25 2233 03/22/25 2233 03/22/25 2233 03/22/25 2233   36.6 °C (97.9 °F) (!) 108 (!) 22 (!) 148/101      Pulse Ox Temp Source Heart Rate Source Patient Position   03/22/25 2237 03/22/25 2233 03/22/25 2233 --   97 % Temporal Monitor       BP Location FiO2 (%)     -- --             Physical Exam  Vitals and nursing note reviewed.   Constitutional:       General: He is not in acute distress.     Appearance: He is well-developed.      Comments: Anxious slightly tremorous otherwise no apparent distress drinking water   HENT:      Head: Normocephalic and atraumatic.   Eyes:      Conjunctiva/sclera: Conjunctivae normal.   Cardiovascular:      Rate and Rhythm: Normal  rate and regular rhythm.      Heart sounds: No murmur heard.  Pulmonary:      Effort: Pulmonary effort is normal. No respiratory distress.      Breath sounds: Normal breath sounds.   Abdominal:      Palpations: Abdomen is soft.      Tenderness: There is no abdominal tenderness.   Musculoskeletal:         General: No swelling.      Cervical back: Neck supple.   Skin:     General: Skin is warm and dry.      Capillary Refill: Capillary refill takes less than 2 seconds.   Neurological:      Mental Status: He is alert.   Psychiatric:         Mood and Affect: Mood normal.           ED Course & MDM   ED Course as of 03/23/25 0114   Sat Mar 22, 2025   2241 EKG Time:2241  EKG Interpretation time:2241  EKG Interpretation: EKG shows sinus tachycardia with a rate of 103 bpm, normal axis, QTc 440, no evidence of STEMI.    EKG was interpreted by myself independently [JL]      ED Course User Index  [JL] Julius Oro DO         Diagnoses as of 03/23/25 0114   Alcohol abuse   Anxiety                 No data recorded     [place] Coma Scale Score: 15 (03/22/25 2237 : Naomi Judge RN)                           Medical Decision Making  Parts of this chart have been completed using voice recognition software. Please excuse any errors of transcription.  My thought process and reason for plan has been formulated from the time that I saw the patient until the time of disposition and is not specific to one specific moment during their visit and furthermore my MDM encompasses this entire chart and not only this text box.      HPI: Detailed above.    Exam: A medically appropriate exam performed, outlined above, given the known history and presentation.    History obtained from: Patient, chart review    EKG: Interpreted by attending physician and reviewed by me    Social Determinants of Health considered during this visit: Lives independently    Medications given during visit:  Medications   folic acid (Folvite) tablet 1 mg (1 mg oral Given  3/22/25 2251)   multivitamin with minerals 1 tablet (1 tablet oral Given 3/22/25 2251)   LORazepam (Ativan) tablet 0.5 mg (0.5 mg oral Given 3/22/25 2251)     Or   LORazepam (Ativan) tablet 1 mg ( oral See Alternative 3/22/25 2251)     Or   LORazepam (Ativan) tablet 2 mg ( oral See Alternative 3/22/25 2251)   thiamine (Vitamin B1) injection 100 mg (100 mg intravenous Given 3/22/25 2251)   thiamine (Vitamin B-1) tablet 100 mg (has no administration in time range)   lactated Ringer's bolus 1,000 mL (1,000 mL intravenous New Bag 3/22/25 2315)        Diagnostic/tests  Labs Reviewed   CBC WITH AUTO DIFFERENTIAL - Abnormal       Result Value    WBC 8.8      nRBC 0.0      RBC 3.85 (*)     Hemoglobin 12.6 (*)     Hematocrit 35.2 (*)     MCV 91      MCH 32.7      MCHC 35.8      RDW 13.5      Platelets 230      Neutrophils % 55.2      Immature Granulocytes %, Automated 0.7      Lymphocytes % 33.5      Monocytes % 6.2      Eosinophils % 3.7      Basophils % 0.7      Neutrophils Absolute 4.86      Immature Granulocytes Absolute, Automated 0.06      Lymphocytes Absolute 2.95      Monocytes Absolute 0.55      Eosinophils Absolute 0.33      Basophils Absolute 0.06     COMPREHENSIVE METABOLIC PANEL - Abnormal    Glucose 109 (*)     Sodium 137      Potassium 4.0      Chloride 105      Bicarbonate 20 (*)     Anion Gap 16      Urea Nitrogen 23      Creatinine 0.74      eGFR >90      Calcium 8.2 (*)     Albumin 3.9      Alkaline Phosphatase 76      Total Protein 6.6      AST 23      Bilirubin, Total 0.3      ALT 22     ACUTE TOXICOLOGY PANEL, BLOOD - Abnormal    Acetaminophen <10.0      Salicylate  <3      Alcohol 99 (*)    DRUG SCREEN,URINE   URINALYSIS WITH REFLEX CULTURE AND MICROSCOPIC    Narrative:     The following orders were created for panel order Urinalysis with Reflex Culture and Microscopic.  Procedure                               Abnormality         Status                     ---------                                -----------         ------                     Urinalysis with Reflex C...[[id number]]                                                 Extra Urine Gray Tube[[id number]]                                                         Please view results for these tests on the individual orders.   URINALYSIS WITH REFLEX CULTURE AND MICROSCOPIC   EXTRA URINE GRAY TUBE      No orders to display        Considerations/further MDM:  Patient is a 65-year-old male presenting for alcohol detox    Patient is awake alert anxious but otherwise in no apparent distress.  He has no evidence of airway compromise or respiratory distress.  Vital signs are initially with hypertension and tachycardia which did improve throughout the visit.  Alcohol withdrawal order set initiated patient provided fluids, folic acid, thiamine and also 0.5 of Ativan.  Laboratory workup with anemia that is chronic and stable compared to baseline as well as elevated alcohol level otherwise is unremarkable.  Awaiting urinalysis at the time of my signout.  Also awaiting medical clearance for [facility] to assess for placement for alcohol detox.  Please defer to attending physician note for disposition.  It has reached the end of my shift and results are still pending.  From this point onward patient care will be given by Dr. Oro.  Please refer to his note for additional details regarding the remainder of the patient visit and disposition.            Procedure  Procedures     La Dennis PA-C  03/23/25 0114

## 2025-03-24 LAB
ATRIAL RATE: 103 BPM
P AXIS: 76 DEGREES
P OFFSET: 195 MS
P ONSET: 141 MS
PR INTERVAL: 166 MS
Q ONSET: 224 MS
QRS COUNT: 16 BEATS
QRS DURATION: 84 MS
QT INTERVAL: 336 MS
QTC CALCULATION(BAZETT): 440 MS
QTC FREDERICIA: 402 MS
R AXIS: 76 DEGREES
T AXIS: 66 DEGREES
T OFFSET: 392 MS
VENTRICULAR RATE: 103 BPM

## 2025-04-03 ENCOUNTER — APPOINTMENT (OUTPATIENT)
Dept: CARDIOLOGY | Facility: HOSPITAL | Age: 65
End: 2025-04-03
Payer: COMMERCIAL

## 2025-04-03 ENCOUNTER — HOSPITAL ENCOUNTER (EMERGENCY)
Facility: HOSPITAL | Age: 65
Discharge: OTHER INSTITUTION | End: 2025-04-03
Payer: COMMERCIAL

## 2025-04-03 VITALS
OXYGEN SATURATION: 97 % | RESPIRATION RATE: 17 BRPM | DIASTOLIC BLOOD PRESSURE: 72 MMHG | BODY MASS INDEX: 24.38 KG/M2 | SYSTOLIC BLOOD PRESSURE: 139 MMHG | TEMPERATURE: 98.2 F | HEART RATE: 80 BPM | HEIGHT: 72 IN | WEIGHT: 180 LBS

## 2025-04-03 DIAGNOSIS — T14.91XA SUICIDAL BEHAVIOR WITH ATTEMPTED SELF-INJURY: Primary | ICD-10-CM

## 2025-04-03 LAB
ALBUMIN SERPL BCP-MCNC: 4.3 G/DL (ref 3.4–5)
ALP SERPL-CCNC: 62 U/L (ref 33–136)
ALT SERPL W P-5'-P-CCNC: 13 U/L (ref 10–52)
AMPHETAMINES UR QL SCN: ABNORMAL
ANION GAP SERPL CALCULATED.3IONS-SCNC: 14 MMOL/L (ref 10–20)
APAP SERPL-MCNC: <10 UG/ML
APPEARANCE UR: CLEAR
AST SERPL W P-5'-P-CCNC: 20 U/L (ref 9–39)
ATRIAL RATE: 87 BPM
BARBITURATES UR QL SCN: ABNORMAL
BASOPHILS # BLD AUTO: 0.07 X10*3/UL (ref 0–0.1)
BASOPHILS NFR BLD AUTO: 0.7 %
BENZODIAZ UR QL SCN: ABNORMAL
BILIRUB SERPL-MCNC: 0.3 MG/DL (ref 0–1.2)
BILIRUB UR STRIP.AUTO-MCNC: NEGATIVE MG/DL
BUN SERPL-MCNC: 19 MG/DL (ref 6–23)
BZE UR QL SCN: ABNORMAL
CALCIUM SERPL-MCNC: 8.8 MG/DL (ref 8.6–10.3)
CANNABINOIDS UR QL SCN: ABNORMAL
CHLORIDE SERPL-SCNC: 105 MMOL/L (ref 98–107)
CO2 SERPL-SCNC: 24 MMOL/L (ref 21–32)
COLOR UR: ABNORMAL
CREAT SERPL-MCNC: 0.67 MG/DL (ref 0.5–1.3)
EGFRCR SERPLBLD CKD-EPI 2021: >90 ML/MIN/1.73M*2
EOSINOPHIL # BLD AUTO: 0.07 X10*3/UL (ref 0–0.7)
EOSINOPHIL NFR BLD AUTO: 0.7 %
ERYTHROCYTE [DISTWIDTH] IN BLOOD BY AUTOMATED COUNT: 13.2 % (ref 11.5–14.5)
ETHANOL SERPL-MCNC: 179 MG/DL
FENTANYL+NORFENTANYL UR QL SCN: ABNORMAL
GLUCOSE SERPL-MCNC: 100 MG/DL (ref 74–99)
GLUCOSE UR STRIP.AUTO-MCNC: NORMAL MG/DL
HCT VFR BLD AUTO: 41.2 % (ref 41–52)
HGB BLD-MCNC: 14.3 G/DL (ref 13.5–17.5)
IMM GRANULOCYTES # BLD AUTO: 0.05 X10*3/UL (ref 0–0.7)
IMM GRANULOCYTES NFR BLD AUTO: 0.5 % (ref 0–0.9)
KETONES UR STRIP.AUTO-MCNC: NEGATIVE MG/DL
LEUKOCYTE ESTERASE UR QL STRIP.AUTO: NEGATIVE
LYMPHOCYTES # BLD AUTO: 2.39 X10*3/UL (ref 1.2–4.8)
LYMPHOCYTES NFR BLD AUTO: 25.1 %
MCH RBC QN AUTO: 32.6 PG (ref 26–34)
MCHC RBC AUTO-ENTMCNC: 34.7 G/DL (ref 32–36)
MCV RBC AUTO: 94 FL (ref 80–100)
METHADONE UR QL SCN: ABNORMAL
MONOCYTES # BLD AUTO: 0.83 X10*3/UL (ref 0.1–1)
MONOCYTES NFR BLD AUTO: 8.7 %
NEUTROPHILS # BLD AUTO: 6.12 X10*3/UL (ref 1.2–7.7)
NEUTROPHILS NFR BLD AUTO: 64.3 %
NITRITE UR QL STRIP.AUTO: NEGATIVE
NRBC BLD-RTO: 0 /100 WBCS (ref 0–0)
OPIATES UR QL SCN: ABNORMAL
OXYCODONE+OXYMORPHONE UR QL SCN: ABNORMAL
P AXIS: 71 DEGREES
P OFFSET: 176 MS
P ONSET: 117 MS
PCP UR QL SCN: ABNORMAL
PH UR STRIP.AUTO: 5.5 [PH]
PLATELET # BLD AUTO: 303 X10*3/UL (ref 150–450)
POTASSIUM SERPL-SCNC: 4 MMOL/L (ref 3.5–5.3)
PR INTERVAL: 186 MS
PROT SERPL-MCNC: 6.8 G/DL (ref 6.4–8.2)
PROT UR STRIP.AUTO-MCNC: NEGATIVE MG/DL
Q ONSET: 210 MS
QRS COUNT: 15 BEATS
QRS DURATION: 94 MS
QT INTERVAL: 360 MS
QTC CALCULATION(BAZETT): 433 MS
QTC FREDERICIA: 407 MS
R AXIS: 65 DEGREES
RBC # BLD AUTO: 4.39 X10*6/UL (ref 4.5–5.9)
RBC # UR STRIP.AUTO: ABNORMAL MG/DL
RBC #/AREA URNS AUTO: NORMAL /HPF
SALICYLATES SERPL-MCNC: <3 MG/DL
SODIUM SERPL-SCNC: 139 MMOL/L (ref 136–145)
SP GR UR STRIP.AUTO: 1.02
T AXIS: 60 DEGREES
T OFFSET: 390 MS
UROBILINOGEN UR STRIP.AUTO-MCNC: NORMAL MG/DL
VENTRICULAR RATE: 87 BPM
WBC # BLD AUTO: 9.5 X10*3/UL (ref 4.4–11.3)
WBC #/AREA URNS AUTO: NORMAL /HPF

## 2025-04-03 PROCEDURE — 81001 URINALYSIS AUTO W/SCOPE: CPT

## 2025-04-03 PROCEDURE — 2500000005 HC RX 250 GENERAL PHARMACY W/O HCPCS

## 2025-04-03 PROCEDURE — 85025 COMPLETE CBC W/AUTO DIFF WBC: CPT

## 2025-04-03 PROCEDURE — 99285 EMERGENCY DEPT VISIT HI MDM: CPT

## 2025-04-03 PROCEDURE — 90839 PSYTX CRISIS INITIAL 60 MIN: CPT

## 2025-04-03 PROCEDURE — 36415 COLL VENOUS BLD VENIPUNCTURE: CPT

## 2025-04-03 PROCEDURE — 80320 DRUG SCREEN QUANTALCOHOLS: CPT

## 2025-04-03 PROCEDURE — 93005 ELECTROCARDIOGRAM TRACING: CPT

## 2025-04-03 PROCEDURE — 80307 DRUG TEST PRSMV CHEM ANLYZR: CPT

## 2025-04-03 PROCEDURE — 84075 ASSAY ALKALINE PHOSPHATASE: CPT

## 2025-04-03 PROCEDURE — 80143 DRUG ASSAY ACETAMINOPHEN: CPT

## 2025-04-03 RX ORDER — LIDOCAINE HYDROCHLORIDE 20 MG/ML
1 JELLY TOPICAL ONCE
Status: COMPLETED | OUTPATIENT
Start: 2025-04-03 | End: 2025-04-03

## 2025-04-03 RX ADMIN — LIDOCAINE HYDROCHLORIDE 1 APPLICATION: 20 JELLY TOPICAL at 16:28

## 2025-04-03 SDOH — HEALTH STABILITY: MENTAL HEALTH: SUICIDAL BEHAVIOR (LIFETIME): YES

## 2025-04-03 SDOH — HEALTH STABILITY: MENTAL HEALTH: ACTIVE SUICIDAL IDEATION WITH SPECIFIC PLAN AND INTENT (PAST 1 MONTH): YES

## 2025-04-03 SDOH — HEALTH STABILITY: MENTAL HEALTH: ARE YOU HAVING THOUGHTS OF KILLING YOURSELF RIGHT NOW?: NO

## 2025-04-03 SDOH — HEALTH STABILITY: MENTAL HEALTH: SUICIDAL BEHAVIOR (3 MONTHS): YES

## 2025-04-03 SDOH — HEALTH STABILITY: MENTAL HEALTH: NON-SPECIFIC ACTIVE SUICIDAL THOUGHTS (PAST 1 MONTH): YES

## 2025-04-03 SDOH — HEALTH STABILITY: MENTAL HEALTH: HAVE YOU EVER TRIED TO KILL YOURSELF?: YES

## 2025-04-03 SDOH — ECONOMIC STABILITY: HOUSING INSECURITY: FEELS SAFE LIVING IN HOME: YES

## 2025-04-03 SDOH — HEALTH STABILITY: MENTAL HEALTH: WHEN DID YOU TRY TO KILL YOURSELF?: TODAY

## 2025-04-03 SDOH — HEALTH STABILITY: MENTAL HEALTH: ACTIVE SUICIDAL IDEATION WITH SOME INTENT TO ACT, WITHOUT SPECIFIC PLAN (PAST 1 MONTH): YES

## 2025-04-03 SDOH — HEALTH STABILITY: MENTAL HEALTH
DEPRESSION SYMPTOMS: NO PROBLEMS REPORTED OR OBSERVED.;FEELINGS OF HELPLESSNESS;FEELINGS OF HOPELESSESS;FEELINGS OF WORTHLESSNESS;LOSS OF INTEREST

## 2025-04-03 SDOH — HEALTH STABILITY: MENTAL HEALTH: BEHAVIORAL HEALTH(WDL): EXCEPTIONS TO WDL

## 2025-04-03 SDOH — HEALTH STABILITY: MENTAL HEALTH: IN THE PAST FEW WEEKS, HAVE YOU FELT THAT YOU OR YOUR FAMILY WOULD BE BETTER OFF IF YOU WERE DEAD?: YES

## 2025-04-03 SDOH — HEALTH STABILITY: MENTAL HEALTH: HOW DID YOU TRY TO KILL YOURSELF?: OD ON MEDICATION AND ALCOHOL

## 2025-04-03 SDOH — HEALTH STABILITY: MENTAL HEALTH: IN THE PAST FEW WEEKS, HAVE YOU WISHED YOU WERE DEAD?: YES

## 2025-04-03 SDOH — HEALTH STABILITY: MENTAL HEALTH: SUICIDAL BEHAVIOR (DESCRIPTION): TODAY OVERDOSED ON MEDICATION AND ALCOHOL

## 2025-04-03 SDOH — HEALTH STABILITY: MENTAL HEALTH: IN THE PAST WEEK, HAVE YOU BEEN HAVING THOUGHTS ABOUT KILLING YOURSELF?: YES

## 2025-04-03 SDOH — HEALTH STABILITY: MENTAL HEALTH: WISH TO BE DEAD (PAST 1 MONTH): YES

## 2025-04-03 SDOH — HEALTH STABILITY: MENTAL HEALTH: ANXIETY SYMPTOMS: GENERALIZED

## 2025-04-03 ASSESSMENT — LIFESTYLE VARIABLES
HAVE PEOPLE ANNOYED YOU BY CRITICIZING YOUR DRINKING: NO
EVER HAD A DRINK FIRST THING IN THE MORNING TO STEADY YOUR NERVES TO GET RID OF A HANGOVER: NO
PRESCIPTION_ABUSE_PAST_12_MONTHS: NO
HAVE YOU EVER FELT YOU SHOULD CUT DOWN ON YOUR DRINKING: NO
TOTAL SCORE: 0
TOTAL SCORE: 0
TREMOR: NO TREMOR
PAROXYSMAL SWEATS: NO SWEAT VISIBLE
NAUSEA AND VOMITING: NO NAUSEA AND NO VOMITING
ORIENTATION AND CLOUDING OF SENSORIUM: ORIENTED AND CAN DO SERIAL ADDITIONS
HEADACHE, FULLNESS IN HEAD: NOT PRESENT
AUDITORY DISTURBANCES: NOT PRESENT
AGITATION: NORMAL ACTIVITY
SUBSTANCE_ABUSE_PAST_12_MONTHS: YES
ANXIETY: NO ANXIETY, AT EASE
VISUAL DISTURBANCES: NOT PRESENT
EVER FELT BAD OR GUILTY ABOUT YOUR DRINKING: NO

## 2025-04-03 ASSESSMENT — PAIN SCALES - GENERAL: PAINLEVEL_OUTOF10: 0 - NO PAIN

## 2025-04-03 ASSESSMENT — COLUMBIA-SUICIDE SEVERITY RATING SCALE - C-SSRS
1. IN THE PAST MONTH, HAVE YOU WISHED YOU WERE DEAD OR WISHED YOU COULD GO TO SLEEP AND NOT WAKE UP?: YES
4. HAVE YOU HAD THESE THOUGHTS AND HAD SOME INTENTION OF ACTING ON THEM?: YES
5. HAVE YOU STARTED TO WORK OUT OR WORKED OUT THE DETAILS OF HOW TO KILL YOURSELF? DO YOU INTEND TO CARRY OUT THIS PLAN?: YES
2. HAVE YOU ACTUALLY HAD ANY THOUGHTS OF KILLING YOURSELF?: YES
6. HAVE YOU EVER DONE ANYTHING, STARTED TO DO ANYTHING, OR PREPARED TO DO ANYTHING TO END YOUR LIFE?: YES
6. HAVE YOU EVER DONE ANYTHING, STARTED TO DO ANYTHING, OR PREPARED TO DO ANYTHING TO END YOUR LIFE?: YES
6. HAVE YOU EVER DONE ANYTHING, STARTED TO DO ANYTHING, OR PREPARED TO DO ANYTHING TO END YOUR LIFE?: OVERDOSE ON MEDICATION

## 2025-04-03 ASSESSMENT — PAIN DESCRIPTION - PROGRESSION: CLINICAL_PROGRESSION: NOT CHANGED

## 2025-04-03 ASSESSMENT — PAIN - FUNCTIONAL ASSESSMENT: PAIN_FUNCTIONAL_ASSESSMENT: 0-10

## 2025-04-03 NOTE — ED PROVIDER NOTES
HPI   Chief Complaint   Patient presents with    Suicide Attempt       Patient is a 65-year-old male presenting with a chief complaint of a suicide attempt, patient was recently discharged from the psychiatric facility [place]-door, patient got home, he drank alcohol and took 15 of his 25 mg hydroxyzine in an overdose attempt, patient states he feels depressed, did want to kill himself, patient has no homicidal ideations, no other acute complaints noted at this time.      History provided by:  Patient          Patient History   Past Medical History:   Diagnosis Date    Alcohol use     Depression     Hypertension      No past surgical history on file.  Family History   Problem Relation Name Age of Onset    Hypertension Other      Heart disease Other       Social History     Tobacco Use    Smoking status: Every Day     Types: Cigarettes    Smokeless tobacco: Not on file   Substance Use Topics    Alcohol use: Yes     Comment: 3 pints of vodka a day    Drug use: Yes     Types: Marijuana       Physical Exam   ED Triage Vitals [04/03/25 1155]   Temperature Heart Rate Respirations BP   36.8 °C (98.2 °F) 87 16 158/80      Pulse Ox Temp src Heart Rate Source Patient Position   94 % -- -- --      BP Location FiO2 (%)     -- --       Physical Exam  Vitals and nursing note reviewed. Exam conducted with a chaperone present.   Constitutional:       General: He is not in acute distress.     Appearance: Normal appearance. He is normal weight. He is not ill-appearing, toxic-appearing or diaphoretic.   HENT:      Head: Normocephalic and atraumatic.      Nose: Nose normal.      Mouth/Throat:      Mouth: Mucous membranes are moist.      Pharynx: Oropharynx is clear.   Eyes:      Extraocular Movements: Extraocular movements intact.      Conjunctiva/sclera: Conjunctivae normal.      Pupils: Pupils are equal, round, and reactive to light.   Cardiovascular:      Rate and Rhythm: Normal rate and regular rhythm.      Pulses: Normal pulses.       Heart sounds: Normal heart sounds.   Pulmonary:      Effort: Pulmonary effort is normal.      Breath sounds: Normal breath sounds.   Abdominal:      General: Abdomen is flat. Bowel sounds are normal.      Palpations: Abdomen is soft.   Musculoskeletal:         General: Normal range of motion.   Skin:     General: Skin is warm and dry.      Capillary Refill: Capillary refill takes less than 2 seconds.   Neurological:      General: No focal deficit present.      Mental Status: He is alert and oriented to person, place, and time. Mental status is at baseline.   Psychiatric:         Mood and Affect: Mood is depressed. Affect is flat.         Behavior: Behavior is withdrawn.         Thought Content: Thought content includes suicidal ideation. Thought content includes suicidal plan.           ED Course & MDM   ED Course as of 04/05/25 0833   u Apr 03, 2025   1210 EKG interpreted by myself independently, EKG shows normal sinus rhythm, 87 bpm, PA interval 186, QRS 94, , QTc 433, patient is no ST elevation depression, negative for acute MI. [ROLANDO]   1944 Patient medically cleared to be evaluated by the  and to go to facility. [KW]      ED Course User Index  [ROLANDO] Evan Hernandes DO  [KW] Barrett Rueda DO         Diagnoses as of 04/05/25 0833   Suicidal behavior with attempted self-injury                 No data recorded     Divine Coma Scale Score: 15 (04/03/25 1157 : Opal James LPN)                           Medical Decision Making  Patient seen and evaluated at bedside, patient is in no acute distress.  I will order a CBC, CMP, acute toxicology panel, urine drug screen, urinalysis, EKG, EPAT evaluation. Differential diagnosis includes but is not limited to suicidal ideations, substance abuse, alcohol abuse, intentional overdose  Patient CBC shows hemoglobin 14.3, Ruano crit 41.2, CMP shows glucose 100, alcohol 179, urine drug screen positive for barbiturates and cannabinoids, urinalysis showed no  signs of infection, patient was pending placement into inpatient psychiatric facility at time of my departure, please see oncoming clinician note.    Diagnosis: Suicidal behavior with attempted self-injury  Results for orders placed or performed during the hospital encounter of 04/03/25  -CBC and Auto Differential:   Collection Time: 04/03/25 12:02 PM       Result                      Value             Ref Range           WBC                         9.5               4.4 - 11.3 x*       nRBC                        0.0               0.0 - 0.0 /1*       RBC                         4.39 (L)          4.50 - 5.90 *       Hemoglobin                  14.3              13.5 - 17.5 *       Hematocrit                  41.2              41.0 - 52.0 %       MCV                         94                80 - 100 fL         MCH                         32.6              26.0 - 34.0 *       MCHC                        34.7              32.0 - 36.0 *       RDW                         13.2              11.5 - 14.5 %       Platelets                   303               150 - 450 x1*       Neutrophils %               64.3              40.0 - 80.0 %       Immature Granulocytes *     0.5               0.0 - 0.9 %         Lymphocytes %               25.1              13.0 - 44.0 %       Monocytes %                 8.7               2.0 - 10.0 %        Eosinophils %               0.7               0.0 - 6.0 %         Basophils %                 0.7               0.0 - 2.0 %         Neutrophils Absolute        6.12              1.20 - 7.70 *       Immature Granulocytes *     0.05              0.00 - 0.70 *       Lymphocytes Absolute        2.39              1.20 - 4.80 *       Monocytes Absolute          0.83              0.10 - 1.00 *       Eosinophils Absolute        0.07              0.00 - 0.70 *       Basophils Absolute          0.07              0.00 - 0.10 *  -Comprehensive Metabolic Panel:   Collection Time: 04/03/25 12:02 PM        Result                      Value             Ref Range           Glucose                     100 (H)           74 - 99 mg/dL       Sodium                      139               136 - 145 mm*       Potassium                   4.0               3.5 - 5.3 mm*       Chloride                    105               98 - 107 mmo*       Bicarbonate                 24                21 - 32 mmol*       Anion Gap                   14                10 - 20 mmol*       Urea Nitrogen               19                6 - 23 mg/dL        Creatinine                  0.67              0.50 - 1.30 *       eGFR                        >90               >60 mL/min/1*       Calcium                     8.8               8.6 - 10.3 m*       Albumin                     4.3               3.4 - 5.0 g/*       Alkaline Phosphatase        62                33 - 136 U/L        Total Protein               6.8               6.4 - 8.2 g/*       AST                         20                9 - 39 U/L          Bilirubin, Total            0.3               0.0 - 1.2 mg*       ALT                         13                10 - 52 U/L    -Acute Toxicology Panel, Blood:   Collection Time: 04/03/25 12:02 PM       Result                      Value             Ref Range           Acetaminophen               <10.0             10.0 - 30.0 *       Salicylate                  <3                4 - 20 mg/dL        Alcohol                     179 (H)           <=10 mg/dL     -Electrocardiogram, 12-lead:   Collection Time: 04/03/25 12:10 PM       Result                      Value             Ref Range           Ventricular Rate            87                BPM                 Atrial Rate                 87                BPM                 WI Interval                 186               ms                  QRS Duration                94                ms                  QT Interval                 360               ms                  QTC Calculation(Bazett)      433               ms                  P Axis                      71                degrees             R Axis                      65                degrees             T Axis                      60                degrees             QRS Count                   15                beats               Q Onset                     210               ms                  P Onset                     117               ms                  P Offset                    176               ms                  T Offset                    390               ms                  QTC Fredericia              407               ms             -Drug Screen, Urine:   Collection Time: 04/03/25  4:28 PM       Result                      Value             Ref Range           Amphetamine Screen, Ur*                       Presumptive *   Presumptive Negative       Barbiturate Screen, Ur*                       Presumptive *   Presumptive Positive (A)       Benzodiazepines Screen*                       Presumptive *   Presumptive Negative       Cannabinoid Screen, Ur*                       Presumptive *   Presumptive Positive (A)       Cocaine Metabolite Scr*                       Presumptive *   Presumptive Negative       Fentanyl Screen, Urine                        Presumptive *   Presumptive Negative       Opiate Screen, Urine                          Presumptive *   Presumptive Negative       Oxycodone Screen, Urine                       Presumptive *   Presumptive Negative       PCP Screen, Urine                             Presumptive *   Presumptive Negative       Methadone Screen, Urine                       Presumptive *   Presumptive Negative  -Urinalysis with Reflex Culture and Microscopic:   Collection Time: 04/03/25  4:31 PM       Result                      Value             Ref Range           Color, Urine                Light-Yellow      Light-Yellow*       Appearance, Urine           Clear             Clear               Specific  Gravity, Urine     1.020             1.005 - 1.035       pH, Urine                   5.5               5.0, 5.5, 6.*       Protein, Urine              NEGATIVE          NEGATIVE, 10*       Glucose, Urine              Normal            Normal mg/dL        Blood, Urine                                  NEGATIVE mg/*   0.03 (TRACE) (A)       Ketones, Urine              NEGATIVE          NEGATIVE mg/*       Bilirubin, Urine            NEGATIVE          NEGATIVE mg/*       Urobilinogen, Urine         Normal            Normal mg/dL        Nitrite, Urine              NEGATIVE          NEGATIVE            Leukocyte Esterase, Ur*     NEGATIVE          NEGATIVE       -Extra Urine Gray Tube:   Collection Time: 04/03/25  4:31 PM       Result                      Value             Ref Range           Extra Tube                                                    Hold for add-ons.  -Urinalysis Microscopic:   Collection Time: 04/03/25  4:31 PM       Result                      Value             Ref Range           WBC, Urine                  1-5               1-5, NONE /H*       RBC, Urine                  1-2               NONE, 1-2, 3*  .        Procedure  Procedures  Sections of this report were created using voice-to-text technology and may contain errors in translation    Evan Hernandes DO  Emergency Medicine         Evan Hernandes DO  04/05/25 0804

## 2025-04-03 NOTE — PROGRESS NOTES
Spiritual Care Visit  Spiritual Care Request    Reason for Visit:  Routine Visit: Introduction  Crisis Visit: ED     Request Received From:       Focus of Care:  Visited With: Patient         Refer to :          Spiritual Care Assessment    Spiritual Assessment:                      Care Provided:       Sense of Community and or Episcopalian Affiliation:  Taoist   Values/Beliefs  Spiritual Requests During Hospitalization: I gave Huy a blessing while he was sleeping today.     Addressed Needs/Concerns and/or Marie Through:          Outcome:        Advance Directives:         Spiritual Care Annotation    Annotation:  I gave Huy a blessing while he was sleeping today.  Capo Daugherty

## 2025-04-03 NOTE — ED TRIAGE NOTES
Pt arrived to ER for suicide attempt. Pt recently dc from [facility]. Pt took approx 15 25mg of hydroxyzine and drank alcohol approx an hour prior to arrival.

## 2025-04-03 NOTE — PROGRESS NOTES
Social Work Note  65y.o male presented to [facility] ED via private vehicle. Per staff, the  of the vehicle walked in with patient, stated patient had taken an overdose on vistaril.  then left. Patient allegedly took 15 vistaril along with alcohol in a potential suicide attempt. . Patient well-known to the ED for frequent visits, typically related to alcohol abuse and depression. Patient was most recently in the ED 3/23/25 for alcohol abuse, and was admitted to [facility] for detox at the time. Poison control recommending 6-8 hour observation. SW to meet with patient when medically cleared

## 2025-04-04 LAB — HOLD SPECIMEN: NORMAL

## 2025-04-04 NOTE — ED PROVIDER NOTES
Patient endorsed me by the previous physician.  Patient excepted to [facility] and transferred without complication.     Barrett Rueda,   04/03/25 0578

## 2025-04-04 NOTE — PROGRESS NOTES
EPAT - Social Work Psychiatric Assessment    Arrival Details  Mode of Arrival: Ambulatory  Admission Source: Home  Admission Type: Voluntary  EPAT Assessment Start Date: 04/03/25  EPAT Assessment Start Time: 1945  Name of : Stephanie Mccoy [facility]    History of Present Illness  Admission Reason: SI with OD attempt on Vistaril and alcohol  HPI: Pt presents to the ED due to OD attempt on Vistaril and alcohol.  Prior records were reviewed and indicate that pt has a long hx of depression, anxiety, and alcohol abuse.  Triage noted pt to be at high risk of suicide upon arrival.  Pt reports he just discharged from [facility] after completing alcohol detox.    SW Readmission Information   Readmission within 30 Days: Yes  Previous ED Visit Date and Reason : 3/23/25  Previous Discharge Date and Location: admitted to [facility] for detox  Factors Contributing to  Readmission Inpatient/ED (Team Perspective): Substance Abuse  Readmission From: Home    Psychiatric Symptoms  Anxiety Symptoms: Generalized  Depression Symptoms: No problems reported or observed., Feelings of helplessness, Feelings of hopelessess, Feelings of worthlessness, Loss of interest  Flaca Symptoms: No problems reported or observed.    Psychosis Symptoms  Hallucination Type: No problems reported or observed.  Delusion Type: No problems reported or observed.    Additional Symptoms - Adult  Generalized Anxiety Disorder: No problems reported or observed., Difficult to control worry, Difficulity concentrating, Excessive anxiety/worry  Obsessive Compulsive Disorder: No problems reported or observed.  Panic Attack: No problems reported or observed.  Post Traumatic Stress Disorder: No problems reported or observed.  Delirium: No problems reported or observed.    Past Psychiatric History/Meds/Treatments  Past Psychiatric History: hx of anxiety, depression  Past Psychiatric Meds/Treatments: currently prescribed Vistaril, unclear if pt maintains outpatient providers    Current  Mental Health Contacts   Name/Phone Number: none  Provider Name/Phone Number: none    Support System: Friends    Living Arrangement: House    Home Safety  Feels Safe Living in Home: Yes                        Drug Screening  Have you used any substances (canabis, cocaine, heroin, hallucinogens, inhalants, etc.) in the past 12 months?: Yes  Have you used any prescription drugs other than prescribed in the past 12 months?: No  Is a toxicology screen needed?: Yes    Stage of Change  Stage of Change: Contemplation    Behavioral Health  Behavioral Health(WDL): Within Defined Limits    Orientation  Orientation Level: Oriented X4    General Appearance  Motor Activity: Unremarkable  Speech Pattern: Stuttering  General Attitude: Cooperative  Appearance/Hygiene: Unremarkable    Thought Process  Content: Unremarkable  Perception: Not altered  Hallucination: None  Judgment/Insight: Poor  Confusion: None  Cognition: Poor judgement         Risk Factors  Self Harm/Suicidal Ideation Plan: overdosed on medication and alcohol  Previous Self Harm/Suicidal Plans: denies  Risk Factors: Male, Substance abuse, Unemployment    Violence Risk Assessment  Assessment of Violence: None noted  Thoughts of Harm to Others: No    Ability to Assess Risk Screen  Risk Screen - Ability to Assess: Able to be screened  Ask Suicide-Screening Questions  1. In the past few weeks, have you wished you were dead?: Yes  2. In the past few weeks, have you felt that you or your family would be better off if you were dead?: Yes  3. In the past week, have you been having thoughts about killing yourself?: Yes  4. Have you ever tried to kill yourself?: Yes  How did you try to kill yourself?: od on medication and alcohol  When did you try to kill yourself?: today  5. Are you having thoughts of killing yourself right now?: No  Calculated Risk Score: Potential Risk  Brooke Suicide Severity Rating Scale (Screener/Recent Self-Report)  1. Wish to be Dead  (Past 1 Month): Yes  2. Non-Specific Active Suicidal Thoughts (Past 1 Month): Yes  3. Active Suicidal Ideation with any Methods (Not Plan) Without Intent to Act (Past 1 Month): Yes  4. Active Suicidal Ideation with Some Intent to Act, Without Specific Plan (Past 1 Month): Yes  5. Active Suicidal Ideation with Specific Plan and Intent (Past 1 Month): Yes  6. Suicidal Behavior (Lifetime): Yes  6. Suicidal Behavior (3 Months): Yes  6. Suicidal Behavior (Description): today overdosed on medication and alcohol  Calculated C-SSRS Risk Score (Lifetime/Recent): High Risk  Step 1: Risk Factors  Current & Past Psychiatric Dx: Mood disorder, Alcohol/substance abuse disorders  Presenting Symptoms: Anhedonia, Impulsivity, Hopelessness or despair, Anxiety and/or panic  Precipitants/Stressors: Inadequate social supports, Social isolation  Change in Treatment: Recent inpatient discharge, Non-compliant or not receiving treatment  Access to Lethal Methods : Yes (access to medication and alcohol)  Step 2: Protective Factors   Protective Factors Internal: Fear of death or the actual act of killing self  Protective Factors External: Supportive social network or family or friends  Step 3: Suicidal Ideation Intensity  Most Severe Suicidal Ideation Identified: thoughts to overdose  How Many Times Have You Had These Thoughts: Once a week  When You Have the Thoughts How Long do They Last : 1-4 hours/a lot of the time  Could/Can You Stop Thinking About Killing Yourself or Wanting to Die if You Want to: Unable to control thoughts  Are There Things - Anyone or Anything - That Stopped You From Wanting to Die or Acting on: Deterrents most definitely did not stop you  What Sort of Reasons Did You Have For Thinking About Wanting to Die or Killing Yourself: Equally to get attention, revenge or a reaction from others and to end/stop the pain  Total Score: 18    Psychiatric Impression and Plan of Care  Assessment and Plan: Pt presents to the ED  "following an overdose attempt on Vistaril and alcohol.  Pt reports he took 15 pills of Vistaril, BAL was .179 upon admission.  Pt reports he was just discharged from [facility] after completing alcohol detox, and did not anticipate how his depression would \"hit me so much harder since I'm not numbed out on alcohol and have to face the real world.\"  Pt struggled to complete the assessment due to problems with concentration as well as stuttering, which he attributes to alcohol abuse.  Pt admits that he needs help beyond alcohol treatment, as he has been in substance abuse detox/programs multiple times over the past year.  Pt denies HI, psychosis.  Pt in need of inpatient treatment for safety and stabilization due to suicide attempt.    Outcome/Disposition  Assessment, Recommendations and Risk Level Reviewed with: Dr. Rueda  EPAT Assessment Completed Date: 04/03/25  EPAT Assessment Completed Time: 2015  Patient Disposition: Out of network facility (Specify) ([facility])  Out of Network Reason: Patient requests another facility    21:00 Pt accepted at [facility] under Dr. Pathak, RN--[phone].    "

## 2025-04-07 LAB
ATRIAL RATE: 87 BPM
P AXIS: 71 DEGREES
P OFFSET: 176 MS
P ONSET: 117 MS
PR INTERVAL: 186 MS
Q ONSET: 210 MS
QRS COUNT: 15 BEATS
QRS DURATION: 94 MS
QT INTERVAL: 360 MS
QTC CALCULATION(BAZETT): 433 MS
QTC FREDERICIA: 407 MS
R AXIS: 65 DEGREES
T AXIS: 60 DEGREES
T OFFSET: 390 MS
VENTRICULAR RATE: 87 BPM

## 2025-04-12 ENCOUNTER — HOSPITAL ENCOUNTER (EMERGENCY)
Facility: HOSPITAL | Age: 65
Discharge: NURSING, REHAB OR LONG-TERM CARE | End: 2025-04-13
Attending: EMERGENCY MEDICINE
Payer: COMMERCIAL

## 2025-04-12 ENCOUNTER — APPOINTMENT (OUTPATIENT)
Dept: CARDIOLOGY | Facility: HOSPITAL | Age: 65
End: 2025-04-12
Payer: COMMERCIAL

## 2025-04-12 DIAGNOSIS — F10.10 ALCOHOL ABUSE: Primary | ICD-10-CM

## 2025-04-12 PROCEDURE — 85025 COMPLETE CBC W/AUTO DIFF WBC: CPT | Performed by: PHYSICIAN ASSISTANT

## 2025-04-12 PROCEDURE — 83690 ASSAY OF LIPASE: CPT | Performed by: PHYSICIAN ASSISTANT

## 2025-04-12 PROCEDURE — 36415 COLL VENOUS BLD VENIPUNCTURE: CPT | Performed by: PHYSICIAN ASSISTANT

## 2025-04-12 PROCEDURE — 93005 ELECTROCARDIOGRAM TRACING: CPT

## 2025-04-12 PROCEDURE — 82374 ASSAY BLOOD CARBON DIOXIDE: CPT | Performed by: PHYSICIAN ASSISTANT

## 2025-04-12 PROCEDURE — 82077 ASSAY SPEC XCP UR&BREATH IA: CPT | Performed by: PHYSICIAN ASSISTANT

## 2025-04-12 PROCEDURE — 99285 EMERGENCY DEPT VISIT HI MDM: CPT | Performed by: EMERGENCY MEDICINE

## 2025-04-12 RX ORDER — LORAZEPAM 2 MG/ML
1 INJECTION INTRAMUSCULAR EVERY 2 HOUR PRN
Status: DISCONTINUED | OUTPATIENT
Start: 2025-04-12 | End: 2025-04-13 | Stop reason: HOSPADM

## 2025-04-12 RX ORDER — MULTIVIT-MIN/IRON FUM/FOLIC AC 7.5 MG-4
1 TABLET ORAL DAILY
Status: DISCONTINUED | OUTPATIENT
Start: 2025-04-13 | End: 2025-04-13 | Stop reason: HOSPADM

## 2025-04-12 RX ORDER — THIAMINE HYDROCHLORIDE 100 MG/ML
100 INJECTION, SOLUTION INTRAMUSCULAR; INTRAVENOUS DAILY
Status: DISCONTINUED | OUTPATIENT
Start: 2025-04-13 | End: 2025-04-13 | Stop reason: HOSPADM

## 2025-04-12 RX ORDER — LORAZEPAM 2 MG/ML
0.5 INJECTION INTRAMUSCULAR EVERY 2 HOUR PRN
Status: DISCONTINUED | OUTPATIENT
Start: 2025-04-12 | End: 2025-04-13 | Stop reason: HOSPADM

## 2025-04-12 RX ORDER — FOLIC ACID 1 MG/1
1 TABLET ORAL DAILY
Status: DISCONTINUED | OUTPATIENT
Start: 2025-04-13 | End: 2025-04-13 | Stop reason: HOSPADM

## 2025-04-12 RX ORDER — LANOLIN ALCOHOL/MO/W.PET/CERES
100 CREAM (GRAM) TOPICAL DAILY
Status: DISCONTINUED | OUTPATIENT
Start: 2025-04-15 | End: 2025-04-13 | Stop reason: HOSPADM

## 2025-04-12 RX ORDER — LORAZEPAM 2 MG/ML
2 INJECTION INTRAMUSCULAR EVERY 2 HOUR PRN
Status: DISCONTINUED | OUTPATIENT
Start: 2025-04-12 | End: 2025-04-13 | Stop reason: HOSPADM

## 2025-04-12 ASSESSMENT — LIFESTYLE VARIABLES
EVER FELT BAD OR GUILTY ABOUT YOUR DRINKING: NO
EVER HAD A DRINK FIRST THING IN THE MORNING TO STEADY YOUR NERVES TO GET RID OF A HANGOVER: NO
HAVE PEOPLE ANNOYED YOU BY CRITICIZING YOUR DRINKING: NO
HAVE YOU EVER FELT YOU SHOULD CUT DOWN ON YOUR DRINKING: NO
TOTAL SCORE: 0

## 2025-04-12 ASSESSMENT — PAIN - FUNCTIONAL ASSESSMENT: PAIN_FUNCTIONAL_ASSESSMENT: 0-10

## 2025-04-12 ASSESSMENT — PAIN SCALES - GENERAL: PAINLEVEL_OUTOF10: 0 - NO PAIN

## 2025-04-13 VITALS
TEMPERATURE: 97.6 F | WEIGHT: 185 LBS | HEART RATE: 86 BPM | RESPIRATION RATE: 17 BRPM | BODY MASS INDEX: 25.06 KG/M2 | HEIGHT: 72 IN | SYSTOLIC BLOOD PRESSURE: 133 MMHG | DIASTOLIC BLOOD PRESSURE: 79 MMHG | OXYGEN SATURATION: 97 %

## 2025-04-13 LAB
ALBUMIN SERPL BCP-MCNC: 3.8 G/DL (ref 3.4–5)
ALP SERPL-CCNC: 60 U/L (ref 33–136)
ALT SERPL W P-5'-P-CCNC: 15 U/L (ref 10–52)
ANION GAP SERPL CALCULATED.3IONS-SCNC: 14 MMOL/L (ref 10–20)
AST SERPL W P-5'-P-CCNC: 21 U/L (ref 9–39)
BASOPHILS # BLD AUTO: 0.06 X10*3/UL (ref 0–0.1)
BASOPHILS NFR BLD AUTO: 0.5 %
BILIRUB SERPL-MCNC: 0.3 MG/DL (ref 0–1.2)
BUN SERPL-MCNC: 21 MG/DL (ref 6–23)
CALCIUM SERPL-MCNC: 7.9 MG/DL (ref 8.6–10.3)
CHLORIDE SERPL-SCNC: 105 MMOL/L (ref 98–107)
CO2 SERPL-SCNC: 23 MMOL/L (ref 21–32)
CREAT SERPL-MCNC: 0.76 MG/DL (ref 0.5–1.3)
EGFRCR SERPLBLD CKD-EPI 2021: >90 ML/MIN/1.73M*2
EOSINOPHIL # BLD AUTO: 0.16 X10*3/UL (ref 0–0.7)
EOSINOPHIL NFR BLD AUTO: 1.2 %
ERYTHROCYTE [DISTWIDTH] IN BLOOD BY AUTOMATED COUNT: 12.5 % (ref 11.5–14.5)
ETHANOL SERPL-MCNC: 214 MG/DL
GLUCOSE SERPL-MCNC: 125 MG/DL (ref 74–99)
HCT VFR BLD AUTO: 38.6 % (ref 41–52)
HGB BLD-MCNC: 14.4 G/DL (ref 13.5–17.5)
IMM GRANULOCYTES # BLD AUTO: 0.06 X10*3/UL (ref 0–0.7)
IMM GRANULOCYTES NFR BLD AUTO: 0.5 % (ref 0–0.9)
LIPASE SERPL-CCNC: 78 U/L (ref 9–82)
LYMPHOCYTES # BLD AUTO: 3.62 X10*3/UL (ref 1.2–4.8)
LYMPHOCYTES NFR BLD AUTO: 27.7 %
MCH RBC QN AUTO: 34.1 PG (ref 26–34)
MCHC RBC AUTO-ENTMCNC: 37.3 G/DL (ref 32–36)
MCV RBC AUTO: 92 FL (ref 80–100)
MONOCYTES # BLD AUTO: 0.86 X10*3/UL (ref 0.1–1)
MONOCYTES NFR BLD AUTO: 6.6 %
NEUTROPHILS # BLD AUTO: 8.33 X10*3/UL (ref 1.2–7.7)
NEUTROPHILS NFR BLD AUTO: 63.5 %
NRBC BLD-RTO: 0 /100 WBCS (ref 0–0)
PLATELET # BLD AUTO: 166 X10*3/UL (ref 150–450)
POTASSIUM SERPL-SCNC: 3.2 MMOL/L (ref 3.5–5.3)
PROT SERPL-MCNC: 6.3 G/DL (ref 6.4–8.2)
RBC # BLD AUTO: 4.22 X10*6/UL (ref 4.5–5.9)
SODIUM SERPL-SCNC: 139 MMOL/L (ref 136–145)
WBC # BLD AUTO: 13.1 X10*3/UL (ref 4.4–11.3)

## 2025-04-13 PROCEDURE — 90839 PSYTX CRISIS INITIAL 60 MIN: CPT

## 2025-04-13 RX ORDER — ONDANSETRON HYDROCHLORIDE 2 MG/ML
4 INJECTION, SOLUTION INTRAVENOUS ONCE
Status: DISCONTINUED | OUTPATIENT
Start: 2025-04-13 | End: 2025-04-13 | Stop reason: HOSPADM

## 2025-04-13 SDOH — HEALTH STABILITY: MENTAL HEALTH: DEPRESSION SYMPTOMS: FEELINGS OF HELPLESSNESS;FEELINGS OF HOPELESSESS;IMPAIRED CONCENTRATION;ISOLATIVE

## 2025-04-13 SDOH — HEALTH STABILITY: MENTAL HEALTH: IN THE PAST FEW WEEKS, HAVE YOU WISHED YOU WERE DEAD?: NO

## 2025-04-13 SDOH — ECONOMIC STABILITY: HOUSING INSECURITY: FEELS SAFE LIVING IN HOME: YES

## 2025-04-13 SDOH — HEALTH STABILITY: MENTAL HEALTH: ANXIETY SYMPTOMS: NO PROBLEMS REPORTED OR OBSERVED.

## 2025-04-13 SDOH — HEALTH STABILITY: MENTAL HEALTH: HAVE YOU EVER TRIED TO KILL YOURSELF?: NO

## 2025-04-13 SDOH — HEALTH STABILITY: MENTAL HEALTH: ARE YOU HAVING THOUGHTS OF KILLING YOURSELF RIGHT NOW?: NO

## 2025-04-13 SDOH — HEALTH STABILITY: MENTAL HEALTH: IN THE PAST FEW WEEKS, HAVE YOU FELT THAT YOU OR YOUR FAMILY WOULD BE BETTER OFF IF YOU WERE DEAD?: NO

## 2025-04-13 SDOH — HEALTH STABILITY: MENTAL HEALTH: IN THE PAST WEEK, HAVE YOU BEEN HAVING THOUGHTS ABOUT KILLING YOURSELF?: NO

## 2025-04-13 ASSESSMENT — LIFESTYLE VARIABLES
PRESCIPTION_ABUSE_PAST_12_MONTHS: NO
SUBSTANCE_ABUSE_PAST_12_MONTHS: NO

## 2025-04-13 ASSESSMENT — PAIN SCALES - GENERAL
PAINLEVEL_OUTOF10: 0 - NO PAIN

## 2025-04-13 NOTE — PROGRESS NOTES
Social Work Note    TCF Sonja at [facility]. Pt accepted for detox by Dr. Pathak at 06:04. Pt assigned to [address] - RN2RN - [phone]. PLEASE TRANSPORT AFTER 7:30 AM

## 2025-04-13 NOTE — ED PROVIDER NOTES
Care of the patient signed out to me by the outgoing physician, this is a 65-year-old male who presents to the ED intoxicated and is hoping for inpatient detox placement.  Medical workup shows elevated alcohol, otherwise was unremarkable.  He was medically cleared by the prior physician.  During my shift, the patient was accepted for detox by Dr. Pathak at [facility].  He was transferred in stable condition for further medical care.     Saturnino Worthington,   04/13/25 0651

## 2025-04-13 NOTE — ED PROVIDER NOTES
HPI   Chief Complaint   Patient presents with   • Drug / Alcohol Assessment     Patient states he would like help detoxing from alcohol- last drink about an hour ago.  States was at [facility] last week as well.         65-year-old male presented emergency department with a chief complaint of alcohol abuse wanting alcohol detox.  He has been to the emergency department many times for similar complaint.  He is not currently suicidal or homicidal.  His last drink was 1 hour prior to arrival.  He is wanting to go back to an inpatient detox.  He denies lightheadedness dizziness chest pain shortness of breath.  Well-appearing nontoxic afebrile.  No fall or injury.  No other complaint.              Patient History   Past Medical History:   Diagnosis Date   • Alcohol use    • Depression    • Hypertension      No past surgical history on file.  Family History   Problem Relation Name Age of Onset   • Hypertension Other     • Heart disease Other       Social History     Tobacco Use   • Smoking status: Every Day     Types: Cigarettes   • Smokeless tobacco: Not on file   Substance Use Topics   • Alcohol use: Yes     Comment: 3 pints of vodka a day   • Drug use: Yes     Types: Marijuana       Physical Exam   ED Triage Vitals [04/12/25 2248]   Temperature Heart Rate Respirations BP   36.6 °C (97.9 °F) (!) 115 18 (!) 140/96      Pulse Ox Temp Source Heart Rate Source Patient Position   97 % Temporal Monitor Sitting      BP Location FiO2 (%)     Right arm --       Physical Exam  Vitals and nursing note reviewed.   Constitutional:       Appearance: Normal appearance.   HENT:      Head: Normocephalic.      Mouth/Throat:      Mouth: Mucous membranes are moist.   Cardiovascular:      Rate and Rhythm: Normal rate and regular rhythm.      Pulses: Normal pulses.      Heart sounds: Normal heart sounds.   Pulmonary:      Effort: Pulmonary effort is normal.      Breath sounds: Normal breath sounds.   Abdominal:      General: Abdomen is flat.       Palpations: Abdomen is soft.   Musculoskeletal:         General: Normal range of motion.      Cervical back: Normal range of motion.   Skin:     General: Skin is warm.   Neurological:      General: No focal deficit present.      Mental Status: He is alert and oriented to person, place, and time.   Psychiatric:         Mood and Affect: Mood normal.         Behavior: Behavior normal.           ED Course & MDM   ED Course as of 04/13/25 0022   Sat Apr 12, 2025   2337 EKG personally turbid by me performed at 2322  Normal sinus rhythm with right axis deviation ventricular rate 99 normal intervals no acute ischemic changes [EF]      ED Course User Index  [EF] Eleni GERARDO Khantammy, DO         Diagnoses as of 04/13/25 0022   Alcohol abuse                 No data recorded     Divine Coma Scale Score: 15 (04/12/25 2249 : Anastasiya Donahue RN)                           Medical Decision Making  I have seen and evaluated this patient.  The attending physician has also seen and evaluated this patient.  Vital signs, laboratory testing and diagnostic images if applicable have been reviewed.  All laboratory and imaging is interpreted by myself unless otherwise stated.  Radiology studies are also formally interpreted by radiologist.     CBC with 13,000 leukocytosis, no significant anemia.    Labs Reviewed  CBC WITH AUTO DIFFERENTIAL - Abnormal     WBC                           13.1 (*)               nRBC                          0.0                    RBC                           4.22 (*)               Hemoglobin                    14.4                   Hematocrit                    38.6 (*)               MCV                           92                     MCH                           34.1 (*)               MCHC                          37.3 (*)               RDW                           12.5                   Platelets                     166                    Neutrophils %                 63.5                   Immature Granulocytes  %, Automated   0.5                    Lymphocytes %                 27.7                   Monocytes %                   6.6                    Eosinophils %                 1.2                    Basophils %                   0.5                    Neutrophils Absolute          8.33 (*)               Immature Granulocytes Absolute, Au*   0.06                   Lymphocytes Absolute          3.62                   Monocytes Absolute            0.86                   Eosinophils Absolute          0.16                   Basophils Absolute            0.06                COMPREHENSIVE METABOLIC PANEL  URINALYSIS WITH REFLEX CULTURE AND MICROSCOPIC       Narrative: The following orders were created for panel order Urinalysis with Reflex Culture and Microscopic.                Procedure                               Abnormality         Status                                   ---------                               -----------         ------                                   Urinalysis with Reflex C...[[id number]]                                                               Extra Urine Gray Tube[[id number]]                                                                                     Please view results for these tests on the individual orders.  ALCOHOL  DRUG SCREEN,URINE  LIPASE  URINALYSIS WITH REFLEX CULTURE AND MICROSCOPIC  EXTRA URINE GRAY TUBE  No orders to display  Medications  folic acid (Folvite) tablet 1 mg (has no administration in time range)  multivitamin with minerals 1 tablet (has no administration in time range)  LORazepam (Ativan) injection 0.5 mg (has no administration in time range)    Or  LORazepam (Ativan) injection 1 mg (has no administration in time range)    Or  LORazepam (Ativan) injection 2 mg (has no administration in time range)  thiamine (Vitamin B1) injection 100 mg (has no administration in time range)   thiamine (Vitamin B-1) tablet 100 mg (has no administration in time range)  New  Prescriptions  No medications on file            Procedure  Procedures     Julius Wiley PA-C  04/13/25 0022
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
## 2025-04-15 LAB
ATRIAL RATE: 99 BPM
P AXIS: 119 DEGREES
P OFFSET: 181 MS
P ONSET: 119 MS
PR INTERVAL: 180 MS
Q ONSET: 209 MS
QRS COUNT: 16 BEATS
QRS DURATION: 100 MS
QT INTERVAL: 346 MS
QTC CALCULATION(BAZETT): 444 MS
QTC FREDERICIA: 408 MS
R AXIS: 127 DEGREES
T AXIS: 139 DEGREES
T OFFSET: 382 MS
VENTRICULAR RATE: 99 BPM

## 2025-05-01 ENCOUNTER — APPOINTMENT (OUTPATIENT)
Dept: RADIOLOGY | Facility: HOSPITAL | Age: 65
End: 2025-05-01
Payer: COMMERCIAL

## 2025-05-01 ENCOUNTER — HOSPITAL ENCOUNTER (INPATIENT)
Facility: HOSPITAL | Age: 65
LOS: 1 days | Discharge: LEFT BEFORE DISCHARGE | End: 2025-05-03
Attending: STUDENT IN AN ORGANIZED HEALTH CARE EDUCATION/TRAINING PROGRAM | Admitting: STUDENT IN AN ORGANIZED HEALTH CARE EDUCATION/TRAINING PROGRAM
Payer: COMMERCIAL

## 2025-05-01 ENCOUNTER — APPOINTMENT (OUTPATIENT)
Dept: CARDIOLOGY | Facility: HOSPITAL | Age: 65
End: 2025-05-01
Payer: COMMERCIAL

## 2025-05-01 DIAGNOSIS — F10.920 ALCOHOLIC INTOXICATION WITHOUT COMPLICATION: ICD-10-CM

## 2025-05-01 DIAGNOSIS — R47.01 APHASIA: Primary | ICD-10-CM

## 2025-05-01 DIAGNOSIS — R00.0 TACHYCARDIA: ICD-10-CM

## 2025-05-01 LAB
ALBUMIN SERPL BCP-MCNC: 3.8 G/DL (ref 3.4–5)
ALP SERPL-CCNC: 60 U/L (ref 33–136)
ALT SERPL W P-5'-P-CCNC: 21 U/L (ref 10–52)
ANION GAP SERPL CALCULATED.3IONS-SCNC: 17 MMOL/L (ref 10–20)
AST SERPL W P-5'-P-CCNC: 22 U/L (ref 9–39)
BASOPHILS # BLD AUTO: 0.04 X10*3/UL (ref 0–0.1)
BASOPHILS NFR BLD AUTO: 0.5 %
BILIRUB SERPL-MCNC: 0.2 MG/DL (ref 0–1.2)
BUN SERPL-MCNC: 21 MG/DL (ref 6–23)
CALCIUM SERPL-MCNC: 8.6 MG/DL (ref 8.6–10.3)
CARDIAC TROPONIN I PNL SERPL HS: 7 NG/L (ref 0–20)
CHLORIDE SERPL-SCNC: 106 MMOL/L (ref 98–107)
CO2 SERPL-SCNC: 24 MMOL/L (ref 21–32)
CREAT SERPL-MCNC: 0.91 MG/DL (ref 0.5–1.3)
EGFRCR SERPLBLD CKD-EPI 2021: >90 ML/MIN/1.73M*2
EOSINOPHIL # BLD AUTO: 0.1 X10*3/UL (ref 0–0.7)
EOSINOPHIL NFR BLD AUTO: 1.2 %
ERYTHROCYTE [DISTWIDTH] IN BLOOD BY AUTOMATED COUNT: 13 % (ref 11.5–14.5)
ETHANOL SERPL-MCNC: 152 MG/DL
GLUCOSE SERPL-MCNC: 142 MG/DL (ref 74–99)
HCT VFR BLD AUTO: 36.7 % (ref 41–52)
HGB BLD-MCNC: 12.7 G/DL (ref 13.5–17.5)
IMM GRANULOCYTES # BLD AUTO: 0.03 X10*3/UL (ref 0–0.7)
IMM GRANULOCYTES NFR BLD AUTO: 0.4 % (ref 0–0.9)
LYMPHOCYTES # BLD AUTO: 2.26 X10*3/UL (ref 1.2–4.8)
LYMPHOCYTES NFR BLD AUTO: 28.2 %
MAGNESIUM SERPL-MCNC: 1.6 MG/DL (ref 1.6–2.4)
MCH RBC QN AUTO: 32.6 PG (ref 26–34)
MCHC RBC AUTO-ENTMCNC: 34.6 G/DL (ref 32–36)
MCV RBC AUTO: 94 FL (ref 80–100)
MONOCYTES # BLD AUTO: 0.39 X10*3/UL (ref 0.1–1)
MONOCYTES NFR BLD AUTO: 4.9 %
NEUTROPHILS # BLD AUTO: 5.19 X10*3/UL (ref 1.2–7.7)
NEUTROPHILS NFR BLD AUTO: 64.8 %
NRBC BLD-RTO: 0 /100 WBCS (ref 0–0)
PLATELET # BLD AUTO: 225 X10*3/UL (ref 150–450)
POTASSIUM SERPL-SCNC: 3.6 MMOL/L (ref 3.5–5.3)
PROT SERPL-MCNC: 6.2 G/DL (ref 6.4–8.2)
RBC # BLD AUTO: 3.9 X10*6/UL (ref 4.5–5.9)
SODIUM SERPL-SCNC: 143 MMOL/L (ref 136–145)
WBC # BLD AUTO: 8 X10*3/UL (ref 4.4–11.3)

## 2025-05-01 PROCEDURE — 85025 COMPLETE CBC W/AUTO DIFF WBC: CPT | Performed by: STUDENT IN AN ORGANIZED HEALTH CARE EDUCATION/TRAINING PROGRAM

## 2025-05-01 PROCEDURE — 70450 CT HEAD/BRAIN W/O DYE: CPT | Performed by: RADIOLOGY

## 2025-05-01 PROCEDURE — 84484 ASSAY OF TROPONIN QUANT: CPT | Performed by: STUDENT IN AN ORGANIZED HEALTH CARE EDUCATION/TRAINING PROGRAM

## 2025-05-01 PROCEDURE — 99285 EMERGENCY DEPT VISIT HI MDM: CPT | Mod: 25 | Performed by: STUDENT IN AN ORGANIZED HEALTH CARE EDUCATION/TRAINING PROGRAM

## 2025-05-01 PROCEDURE — 2500000004 HC RX 250 GENERAL PHARMACY W/ HCPCS (ALT 636 FOR OP/ED): Mod: JZ | Performed by: CLINICAL NURSE SPECIALIST

## 2025-05-01 PROCEDURE — 70450 CT HEAD/BRAIN W/O DYE: CPT

## 2025-05-01 PROCEDURE — 93005 ELECTROCARDIOGRAM TRACING: CPT

## 2025-05-01 PROCEDURE — 2500000001 HC RX 250 WO HCPCS SELF ADMINISTERED DRUGS (ALT 637 FOR MEDICARE OP): Performed by: CLINICAL NURSE SPECIALIST

## 2025-05-01 PROCEDURE — 80053 COMPREHEN METABOLIC PANEL: CPT | Performed by: STUDENT IN AN ORGANIZED HEALTH CARE EDUCATION/TRAINING PROGRAM

## 2025-05-01 PROCEDURE — 83735 ASSAY OF MAGNESIUM: CPT | Performed by: CLINICAL NURSE SPECIALIST

## 2025-05-01 PROCEDURE — 82077 ASSAY SPEC XCP UR&BREATH IA: CPT | Performed by: STUDENT IN AN ORGANIZED HEALTH CARE EDUCATION/TRAINING PROGRAM

## 2025-05-01 PROCEDURE — 36415 COLL VENOUS BLD VENIPUNCTURE: CPT | Performed by: STUDENT IN AN ORGANIZED HEALTH CARE EDUCATION/TRAINING PROGRAM

## 2025-05-01 RX ORDER — FOLIC ACID 1 MG/1
1 TABLET ORAL DAILY
Status: DISCONTINUED | OUTPATIENT
Start: 2025-05-02 | End: 2025-05-02

## 2025-05-01 RX ORDER — LORAZEPAM 0.5 MG/1
0.5 TABLET ORAL EVERY 2 HOUR PRN
Status: DISCONTINUED | OUTPATIENT
Start: 2025-05-01 | End: 2025-05-02

## 2025-05-01 RX ORDER — LORAZEPAM 1 MG/1
1 TABLET ORAL EVERY 2 HOUR PRN
Status: DISCONTINUED | OUTPATIENT
Start: 2025-05-01 | End: 2025-05-02

## 2025-05-01 RX ORDER — LORAZEPAM 1 MG/1
2 TABLET ORAL EVERY 2 HOUR PRN
Status: DISCONTINUED | OUTPATIENT
Start: 2025-05-01 | End: 2025-05-02

## 2025-05-01 RX ORDER — LANOLIN ALCOHOL/MO/W.PET/CERES
100 CREAM (GRAM) TOPICAL DAILY
Status: DISCONTINUED | OUTPATIENT
Start: 2025-05-02 | End: 2025-05-02

## 2025-05-01 RX ORDER — MULTIVIT-MIN/IRON FUM/FOLIC AC 7.5 MG-4
1 TABLET ORAL DAILY
Status: DISCONTINUED | OUTPATIENT
Start: 2025-05-02 | End: 2025-05-02

## 2025-05-01 RX ADMIN — SODIUM CHLORIDE 1000 ML: 900 INJECTION, SOLUTION INTRAVENOUS at 21:57

## 2025-05-01 RX ADMIN — LORAZEPAM 0.5 MG: 0.5 TABLET ORAL at 23:59

## 2025-05-01 RX ADMIN — SODIUM CHLORIDE 1000 ML: 9 INJECTION, SOLUTION INTRAVENOUS at 23:38

## 2025-05-01 ASSESSMENT — PAIN - FUNCTIONAL ASSESSMENT: PAIN_FUNCTIONAL_ASSESSMENT: 0-10

## 2025-05-01 ASSESSMENT — PAIN SCALES - GENERAL: PAINLEVEL_OUTOF10: 0 - NO PAIN

## 2025-05-02 ENCOUNTER — APPOINTMENT (OUTPATIENT)
Dept: RADIOLOGY | Facility: HOSPITAL | Age: 65
End: 2025-05-02
Payer: COMMERCIAL

## 2025-05-02 ENCOUNTER — APPOINTMENT (OUTPATIENT)
Dept: CARDIOLOGY | Facility: HOSPITAL | Age: 65
End: 2025-05-02
Payer: COMMERCIAL

## 2025-05-02 PROBLEM — R47.01 APHASIA: Status: ACTIVE | Noted: 2025-05-02

## 2025-05-02 PROBLEM — R00.0 TACHYCARDIA: Status: ACTIVE | Noted: 2025-05-02

## 2025-05-02 PROBLEM — F10.90 ALCOHOL USE DISORDER: Status: ACTIVE | Noted: 2024-07-29

## 2025-05-02 PROBLEM — F17.200 TOBACCO USE DISORDER: Status: ACTIVE | Noted: 2025-05-02

## 2025-05-02 LAB
AMPHETAMINES UR QL SCN: ABNORMAL
AMPHETAMINES UR QL SCN: ABNORMAL
ANION GAP SERPL CALCULATED.3IONS-SCNC: 12 MMOL/L (ref 10–20)
APPEARANCE UR: CLEAR
BARBITURATES UR QL SCN: ABNORMAL
BARBITURATES UR QL SCN: ABNORMAL
BENZODIAZ UR QL SCN: ABNORMAL
BENZODIAZ UR QL SCN: ABNORMAL
BILIRUB UR STRIP.AUTO-MCNC: NEGATIVE MG/DL
BUN SERPL-MCNC: 14 MG/DL (ref 6–23)
BZE UR QL SCN: ABNORMAL
BZE UR QL SCN: ABNORMAL
CALCIUM SERPL-MCNC: 8 MG/DL (ref 8.6–10.3)
CANNABINOIDS UR QL SCN: ABNORMAL
CANNABINOIDS UR QL SCN: ABNORMAL
CHLORIDE SERPL-SCNC: 107 MMOL/L (ref 98–107)
CO2 SERPL-SCNC: 25 MMOL/L (ref 21–32)
COLOR UR: ABNORMAL
CREAT SERPL-MCNC: 0.77 MG/DL (ref 0.5–1.3)
EGFRCR SERPLBLD CKD-EPI 2021: >90 ML/MIN/1.73M*2
ERYTHROCYTE [DISTWIDTH] IN BLOOD BY AUTOMATED COUNT: 13.1 % (ref 11.5–14.5)
ETHANOL SERPL-MCNC: 47 MG/DL
FENTANYL+NORFENTANYL UR QL SCN: ABNORMAL
FENTANYL+NORFENTANYL UR QL SCN: ABNORMAL
GLUCOSE SERPL-MCNC: 94 MG/DL (ref 74–99)
GLUCOSE UR STRIP.AUTO-MCNC: NORMAL MG/DL
HCT VFR BLD AUTO: 34.4 % (ref 41–52)
HGB BLD-MCNC: 11.6 G/DL (ref 13.5–17.5)
KETONES UR STRIP.AUTO-MCNC: ABNORMAL MG/DL
LAMOTRIGINE SERPL-MCNC: <1 UG/ML (ref 2.5–15)
LEUKOCYTE ESTERASE UR QL STRIP.AUTO: NEGATIVE
MCH RBC QN AUTO: 32.1 PG (ref 26–34)
MCHC RBC AUTO-ENTMCNC: 33.7 G/DL (ref 32–36)
MCV RBC AUTO: 95 FL (ref 80–100)
METHADONE UR QL SCN: ABNORMAL
METHADONE UR QL SCN: ABNORMAL
NITRITE UR QL STRIP.AUTO: NEGATIVE
NRBC BLD-RTO: 0 /100 WBCS (ref 0–0)
OPIATES UR QL SCN: ABNORMAL
OPIATES UR QL SCN: ABNORMAL
OXYCODONE+OXYMORPHONE UR QL SCN: ABNORMAL
OXYCODONE+OXYMORPHONE UR QL SCN: ABNORMAL
PCP UR QL SCN: ABNORMAL
PCP UR QL SCN: ABNORMAL
PH UR STRIP.AUTO: 5 [PH]
PLATELET # BLD AUTO: 193 X10*3/UL (ref 150–450)
POTASSIUM SERPL-SCNC: 3.7 MMOL/L (ref 3.5–5.3)
PROT UR STRIP.AUTO-MCNC: NEGATIVE MG/DL
RBC # BLD AUTO: 3.61 X10*6/UL (ref 4.5–5.9)
RBC # UR STRIP.AUTO: NEGATIVE MG/DL
SODIUM SERPL-SCNC: 140 MMOL/L (ref 136–145)
SP GR UR STRIP.AUTO: 1.02
UROBILINOGEN UR STRIP.AUTO-MCNC: NORMAL MG/DL
WBC # BLD AUTO: 9.6 X10*3/UL (ref 4.4–11.3)

## 2025-05-02 PROCEDURE — 80307 DRUG TEST PRSMV CHEM ANLYZR: CPT | Performed by: STUDENT IN AN ORGANIZED HEALTH CARE EDUCATION/TRAINING PROGRAM

## 2025-05-02 PROCEDURE — 2500000001 HC RX 250 WO HCPCS SELF ADMINISTERED DRUGS (ALT 637 FOR MEDICARE OP): Performed by: STUDENT IN AN ORGANIZED HEALTH CARE EDUCATION/TRAINING PROGRAM

## 2025-05-02 PROCEDURE — 2500000001 HC RX 250 WO HCPCS SELF ADMINISTERED DRUGS (ALT 637 FOR MEDICARE OP): Performed by: INTERNAL MEDICINE

## 2025-05-02 PROCEDURE — 1200000002 HC GENERAL ROOM WITH TELEMETRY DAILY

## 2025-05-02 PROCEDURE — 80175 DRUG SCREEN QUAN LAMOTRIGINE: CPT | Mod: WESLAB | Performed by: STUDENT IN AN ORGANIZED HEALTH CARE EDUCATION/TRAINING PROGRAM

## 2025-05-02 PROCEDURE — 85027 COMPLETE CBC AUTOMATED: CPT | Performed by: STUDENT IN AN ORGANIZED HEALTH CARE EDUCATION/TRAINING PROGRAM

## 2025-05-02 PROCEDURE — 2500000004 HC RX 250 GENERAL PHARMACY W/ HCPCS (ALT 636 FOR OP/ED): Performed by: STUDENT IN AN ORGANIZED HEALTH CARE EDUCATION/TRAINING PROGRAM

## 2025-05-02 PROCEDURE — 36415 COLL VENOUS BLD VENIPUNCTURE: CPT | Performed by: STUDENT IN AN ORGANIZED HEALTH CARE EDUCATION/TRAINING PROGRAM

## 2025-05-02 PROCEDURE — 70544 MR ANGIOGRAPHY HEAD W/O DYE: CPT | Performed by: RADIOLOGY

## 2025-05-02 PROCEDURE — 80349 CANNABINOIDS NATURAL: CPT | Performed by: STUDENT IN AN ORGANIZED HEALTH CARE EDUCATION/TRAINING PROGRAM

## 2025-05-02 PROCEDURE — 92610 EVALUATE SWALLOWING FUNCTION: CPT | Mod: GN | Performed by: SPEECH-LANGUAGE PATHOLOGIST

## 2025-05-02 PROCEDURE — 99232 SBSQ HOSP IP/OBS MODERATE 35: CPT | Performed by: INTERNAL MEDICINE

## 2025-05-02 PROCEDURE — 99223 1ST HOSP IP/OBS HIGH 75: CPT | Performed by: STUDENT IN AN ORGANIZED HEALTH CARE EDUCATION/TRAINING PROGRAM

## 2025-05-02 PROCEDURE — 70544 MR ANGIOGRAPHY HEAD W/O DYE: CPT

## 2025-05-02 PROCEDURE — 70547 MR ANGIOGRAPHY NECK W/O DYE: CPT

## 2025-05-02 PROCEDURE — 82077 ASSAY SPEC XCP UR&BREATH IA: CPT | Performed by: STUDENT IN AN ORGANIZED HEALTH CARE EDUCATION/TRAINING PROGRAM

## 2025-05-02 PROCEDURE — 81003 URINALYSIS AUTO W/O SCOPE: CPT | Performed by: STUDENT IN AN ORGANIZED HEALTH CARE EDUCATION/TRAINING PROGRAM

## 2025-05-02 PROCEDURE — 70551 MRI BRAIN STEM W/O DYE: CPT | Performed by: RADIOLOGY

## 2025-05-02 PROCEDURE — 70547 MR ANGIOGRAPHY NECK W/O DYE: CPT | Performed by: RADIOLOGY

## 2025-05-02 PROCEDURE — 93005 ELECTROCARDIOGRAM TRACING: CPT

## 2025-05-02 PROCEDURE — 70551 MRI BRAIN STEM W/O DYE: CPT

## 2025-05-02 PROCEDURE — 80048 BASIC METABOLIC PNL TOTAL CA: CPT | Performed by: STUDENT IN AN ORGANIZED HEALTH CARE EDUCATION/TRAINING PROGRAM

## 2025-05-02 RX ORDER — CLONIDINE HYDROCHLORIDE 0.1 MG/1
0.1 TABLET ORAL 2 TIMES DAILY
Status: DISCONTINUED | OUTPATIENT
Start: 2025-05-02 | End: 2025-05-03 | Stop reason: HOSPADM

## 2025-05-02 RX ORDER — FAMOTIDINE 20 MG/1
20 TABLET, FILM COATED ORAL 2 TIMES DAILY
Status: DISCONTINUED | OUTPATIENT
Start: 2025-05-02 | End: 2025-05-03 | Stop reason: HOSPADM

## 2025-05-02 RX ORDER — NAPROXEN SODIUM 220 MG/1
324 TABLET, FILM COATED ORAL ONCE
Status: COMPLETED | OUTPATIENT
Start: 2025-05-02 | End: 2025-05-02

## 2025-05-02 RX ORDER — MULTIVIT-MIN/IRON FUM/FOLIC AC 7.5 MG-4
1 TABLET ORAL DAILY
Status: DISCONTINUED | OUTPATIENT
Start: 2025-05-02 | End: 2025-05-03 | Stop reason: HOSPADM

## 2025-05-02 RX ORDER — LANOLIN ALCOHOL/MO/W.PET/CERES
100 CREAM (GRAM) TOPICAL DAILY
Status: DISCONTINUED | OUTPATIENT
Start: 2025-05-05 | End: 2025-05-03 | Stop reason: HOSPADM

## 2025-05-02 RX ORDER — POLYETHYLENE GLYCOL 3350 17 G/17G
17 POWDER, FOR SOLUTION ORAL DAILY
Status: DISCONTINUED | OUTPATIENT
Start: 2025-05-02 | End: 2025-05-03 | Stop reason: HOSPADM

## 2025-05-02 RX ORDER — ACETAMINOPHEN 325 MG/1
650 TABLET ORAL EVERY 4 HOURS PRN
Status: DISCONTINUED | OUTPATIENT
Start: 2025-05-02 | End: 2025-05-03 | Stop reason: HOSPADM

## 2025-05-02 RX ORDER — THIAMINE HYDROCHLORIDE 100 MG/ML
100 INJECTION, SOLUTION INTRAMUSCULAR; INTRAVENOUS DAILY
Status: DISCONTINUED | OUTPATIENT
Start: 2025-05-02 | End: 2025-05-03 | Stop reason: HOSPADM

## 2025-05-02 RX ORDER — ACETAMINOPHEN 650 MG/1
650 SUPPOSITORY RECTAL EVERY 4 HOURS PRN
Status: DISCONTINUED | OUTPATIENT
Start: 2025-05-02 | End: 2025-05-03 | Stop reason: HOSPADM

## 2025-05-02 RX ORDER — LANOLIN ALCOHOL/MO/W.PET/CERES
100 CREAM (GRAM) TOPICAL DAILY
Status: DISCONTINUED | OUTPATIENT
Start: 2025-05-05 | End: 2025-05-02

## 2025-05-02 RX ORDER — HYDROXYZINE HYDROCHLORIDE 25 MG/1
25 TABLET, FILM COATED ORAL EVERY 4 HOURS PRN
Status: DISCONTINUED | OUTPATIENT
Start: 2025-05-02 | End: 2025-05-03 | Stop reason: HOSPADM

## 2025-05-02 RX ORDER — LORAZEPAM 2 MG/ML
2 INJECTION INTRAMUSCULAR EVERY 2 HOUR PRN
Status: DISCONTINUED | OUTPATIENT
Start: 2025-05-02 | End: 2025-05-03 | Stop reason: HOSPADM

## 2025-05-02 RX ORDER — LORAZEPAM 2 MG/ML
1 INJECTION INTRAMUSCULAR EVERY 2 HOUR PRN
Status: DISCONTINUED | OUTPATIENT
Start: 2025-05-02 | End: 2025-05-03 | Stop reason: HOSPADM

## 2025-05-02 RX ORDER — DESVENLAFAXINE 100 MG/1
100 TABLET, EXTENDED RELEASE ORAL DAILY
Status: DISCONTINUED | OUTPATIENT
Start: 2025-05-02 | End: 2025-05-03 | Stop reason: HOSPADM

## 2025-05-02 RX ORDER — MIRTAZAPINE 15 MG/1
45 TABLET, FILM COATED ORAL NIGHTLY
Status: DISCONTINUED | OUTPATIENT
Start: 2025-05-02 | End: 2025-05-03 | Stop reason: HOSPADM

## 2025-05-02 RX ORDER — ONDANSETRON 4 MG/1
4 TABLET, ORALLY DISINTEGRATING ORAL EVERY 8 HOURS PRN
Status: DISCONTINUED | OUTPATIENT
Start: 2025-05-02 | End: 2025-05-03 | Stop reason: HOSPADM

## 2025-05-02 RX ORDER — ACETAMINOPHEN 160 MG/5ML
650 SOLUTION ORAL EVERY 4 HOURS PRN
Status: DISCONTINUED | OUTPATIENT
Start: 2025-05-02 | End: 2025-05-03 | Stop reason: HOSPADM

## 2025-05-02 RX ORDER — LAMOTRIGINE 100 MG/1
100 TABLET ORAL 2 TIMES DAILY
Status: DISCONTINUED | OUTPATIENT
Start: 2025-05-02 | End: 2025-05-03 | Stop reason: HOSPADM

## 2025-05-02 RX ORDER — LORAZEPAM 2 MG/ML
0.5 INJECTION INTRAMUSCULAR EVERY 2 HOUR PRN
Status: DISCONTINUED | OUTPATIENT
Start: 2025-05-02 | End: 2025-05-03 | Stop reason: HOSPADM

## 2025-05-02 RX ORDER — ENOXAPARIN SODIUM 100 MG/ML
40 INJECTION SUBCUTANEOUS DAILY
Status: DISCONTINUED | OUTPATIENT
Start: 2025-05-02 | End: 2025-05-03 | Stop reason: HOSPADM

## 2025-05-02 RX ORDER — FOLIC ACID 1 MG/1
1 TABLET ORAL DAILY
Status: DISCONTINUED | OUTPATIENT
Start: 2025-05-02 | End: 2025-05-03 | Stop reason: HOSPADM

## 2025-05-02 RX ORDER — ARIPIPRAZOLE 5 MG/1
5 TABLET ORAL DAILY
Status: DISCONTINUED | OUTPATIENT
Start: 2025-05-02 | End: 2025-05-03 | Stop reason: HOSPADM

## 2025-05-02 RX ORDER — ONDANSETRON HYDROCHLORIDE 2 MG/ML
4 INJECTION, SOLUTION INTRAVENOUS EVERY 8 HOURS PRN
Status: DISCONTINUED | OUTPATIENT
Start: 2025-05-02 | End: 2025-05-03 | Stop reason: HOSPADM

## 2025-05-02 RX ORDER — TALC
3 POWDER (GRAM) TOPICAL NIGHTLY PRN
Status: DISCONTINUED | OUTPATIENT
Start: 2025-05-02 | End: 2025-05-03 | Stop reason: HOSPADM

## 2025-05-02 RX ADMIN — FOLIC ACID 1 MG: 1 TABLET ORAL at 10:56

## 2025-05-02 RX ADMIN — ASPIRIN 324 MG: 81 TABLET, CHEWABLE ORAL at 04:53

## 2025-05-02 RX ADMIN — CLONIDINE HYDROCHLORIDE 0.1 MG: 0.1 TABLET ORAL at 21:20

## 2025-05-02 RX ADMIN — LAMOTRIGINE 100 MG: 100 TABLET ORAL at 10:56

## 2025-05-02 RX ADMIN — THIAMINE HYDROCHLORIDE 100 MG: 100 INJECTION, SOLUTION INTRAMUSCULAR; INTRAVENOUS at 11:00

## 2025-05-02 RX ADMIN — LAMOTRIGINE 100 MG: 100 TABLET ORAL at 21:20

## 2025-05-02 RX ADMIN — CLONIDINE HYDROCHLORIDE 0.1 MG: 0.1 TABLET ORAL at 10:56

## 2025-05-02 RX ADMIN — FAMOTIDINE 20 MG: 20 TABLET, FILM COATED ORAL at 21:20

## 2025-05-02 RX ADMIN — MIRTAZAPINE 45 MG: 15 TABLET, FILM COATED ORAL at 21:21

## 2025-05-02 RX ADMIN — Medication 1 TABLET: at 10:57

## 2025-05-02 RX ADMIN — FAMOTIDINE 20 MG: 20 TABLET, FILM COATED ORAL at 10:56

## 2025-05-02 RX ADMIN — ENOXAPARIN SODIUM 40 MG: 40 INJECTION SUBCUTANEOUS at 10:56

## 2025-05-02 SDOH — SOCIAL STABILITY: SOCIAL INSECURITY: ABUSE: ADULT

## 2025-05-02 SDOH — SOCIAL STABILITY: SOCIAL INSECURITY: DO YOU FEEL ANYONE HAS EXPLOITED OR TAKEN ADVANTAGE OF YOU FINANCIALLY OR OF YOUR PERSONAL PROPERTY?: NO

## 2025-05-02 SDOH — SOCIAL STABILITY: SOCIAL INSECURITY: WITHIN THE LAST YEAR, HAVE YOU BEEN AFRAID OF YOUR PARTNER OR EX-PARTNER?: NO

## 2025-05-02 SDOH — ECONOMIC STABILITY: HOUSING INSECURITY: IN THE LAST 12 MONTHS, WAS THERE A TIME WHEN YOU WERE NOT ABLE TO PAY THE MORTGAGE OR RENT ON TIME?: NO

## 2025-05-02 SDOH — ECONOMIC STABILITY: HOUSING INSECURITY: AT ANY TIME IN THE PAST 12 MONTHS, WERE YOU HOMELESS OR LIVING IN A SHELTER (INCLUDING NOW)?: NO

## 2025-05-02 SDOH — ECONOMIC STABILITY: FOOD INSECURITY: HOW HARD IS IT FOR YOU TO PAY FOR THE VERY BASICS LIKE FOOD, HOUSING, MEDICAL CARE, AND HEATING?: NOT HARD AT ALL

## 2025-05-02 SDOH — SOCIAL STABILITY: SOCIAL INSECURITY: ARE YOU OR HAVE YOU BEEN THREATENED OR ABUSED PHYSICALLY, EMOTIONALLY, OR SEXUALLY BY ANYONE?: NO

## 2025-05-02 SDOH — SOCIAL STABILITY: SOCIAL INSECURITY: WITHIN THE LAST YEAR, HAVE YOU BEEN HUMILIATED OR EMOTIONALLY ABUSED IN OTHER WAYS BY YOUR PARTNER OR EX-PARTNER?: NO

## 2025-05-02 SDOH — ECONOMIC STABILITY: FOOD INSECURITY: WITHIN THE PAST 12 MONTHS, YOU WORRIED THAT YOUR FOOD WOULD RUN OUT BEFORE YOU GOT THE MONEY TO BUY MORE.: NEVER TRUE

## 2025-05-02 SDOH — SOCIAL STABILITY: SOCIAL INSECURITY: HAVE YOU HAD THOUGHTS OF HARMING ANYONE ELSE?: NO

## 2025-05-02 SDOH — ECONOMIC STABILITY: FOOD INSECURITY: WITHIN THE PAST 12 MONTHS, THE FOOD YOU BOUGHT JUST DIDN'T LAST AND YOU DIDN'T HAVE MONEY TO GET MORE.: NEVER TRUE

## 2025-05-02 SDOH — SOCIAL STABILITY: SOCIAL INSECURITY
WITHIN THE LAST YEAR, HAVE YOU BEEN RAPED OR FORCED TO HAVE ANY KIND OF SEXUAL ACTIVITY BY YOUR PARTNER OR EX-PARTNER?: NO

## 2025-05-02 SDOH — ECONOMIC STABILITY: INCOME INSECURITY: IN THE PAST 12 MONTHS HAS THE ELECTRIC, GAS, OIL, OR WATER COMPANY THREATENED TO SHUT OFF SERVICES IN YOUR HOME?: NO

## 2025-05-02 SDOH — SOCIAL STABILITY: SOCIAL INSECURITY: HAS ANYONE EVER THREATENED TO HURT YOUR FAMILY OR YOUR PETS?: NO

## 2025-05-02 SDOH — SOCIAL STABILITY: SOCIAL INSECURITY: HAVE YOU HAD ANY THOUGHTS OF HARMING ANYONE ELSE?: NO

## 2025-05-02 SDOH — ECONOMIC STABILITY: TRANSPORTATION INSECURITY: IN THE PAST 12 MONTHS, HAS LACK OF TRANSPORTATION KEPT YOU FROM MEDICAL APPOINTMENTS OR FROM GETTING MEDICATIONS?: NO

## 2025-05-02 SDOH — SOCIAL STABILITY: SOCIAL INSECURITY: ARE THERE ANY APPARENT SIGNS OF INJURIES/BEHAVIORS THAT COULD BE RELATED TO ABUSE/NEGLECT?: NO

## 2025-05-02 SDOH — ECONOMIC STABILITY: HOUSING INSECURITY: IN THE PAST 12 MONTHS, HOW MANY TIMES HAVE YOU MOVED WHERE YOU WERE LIVING?: 1

## 2025-05-02 SDOH — SOCIAL STABILITY: SOCIAL INSECURITY: DO YOU FEEL UNSAFE GOING BACK TO THE PLACE WHERE YOU ARE LIVING?: NO

## 2025-05-02 SDOH — SOCIAL STABILITY: SOCIAL INSECURITY: WERE YOU ABLE TO COMPLETE ALL THE BEHAVIORAL HEALTH SCREENINGS?: YES

## 2025-05-02 SDOH — SOCIAL STABILITY: SOCIAL INSECURITY: DOES ANYONE TRY TO KEEP YOU FROM HAVING/CONTACTING OTHER FRIENDS OR DOING THINGS OUTSIDE YOUR HOME?: NO

## 2025-05-02 ASSESSMENT — LIFESTYLE VARIABLES
HEADACHE, FULLNESS IN HEAD: NOT PRESENT
HEADACHE, FULLNESS IN HEAD: NOT PRESENT
PAROXYSMAL SWEATS: NO SWEAT VISIBLE
AUDITORY DISTURBANCES: NOT PRESENT
AUDIT TOTAL SCORE: 31
AGITATION: NORMAL ACTIVITY
TOTAL SCORE: 0
VISUAL DISTURBANCES: NOT PRESENT
HOW OFTEN DO YOU HAVE A DRINK CONTAINING ALCOHOL: 4 OR MORE TIMES A WEEK
TREMOR: NO TREMOR
TOTAL SCORE: 4
AGITATION: NORMAL ACTIVITY
PAROXYSMAL SWEATS: NO SWEAT VISIBLE
ORIENTATION AND CLOUDING OF SENSORIUM: ORIENTED AND CAN DO SERIAL ADDITIONS
AUDIT-C TOTAL SCORE: 12
TOTAL SCORE: 0
TREMOR: NO TREMOR
ANXIETY: NO ANXIETY, AT EASE
HEADACHE, FULLNESS IN HEAD: NOT PRESENT
ANXIETY: NO ANXIETY, AT EASE
ANXIETY: MILDLY ANXIOUS
TREMOR: NO TREMOR
HOW OFTEN DO YOU HAVE 6 OR MORE DRINKS ON ONE OCCASION: DAILY OR ALMOST DAILY
ORIENTATION AND CLOUDING OF SENSORIUM: ORIENTED AND CAN DO SERIAL ADDITIONS
TREMOR: NO TREMOR
HOW OFTEN DURING THE LAST YEAR HAVE YOU HAD A FEELING OF GUILT OR REMORSE AFTER DRINKING: DAILY OR ALMOST DAILY
VISUAL DISTURBANCES: NOT PRESENT
PULSE: 95
VISUAL DISTURBANCES: NOT PRESENT
TOTAL SCORE: 0
TOTAL SCORE: 1
PAROXYSMAL SWEATS: BEADS OF SWEAT OBVIOUS ON FOREHEAD
HEADACHE, FULLNESS IN HEAD: NOT PRESENT
VISUAL DISTURBANCES: NOT PRESENT
NAUSEA AND VOMITING: NO NAUSEA AND NO VOMITING
HOW MANY STANDARD DRINKS CONTAINING ALCOHOL DO YOU HAVE ON A TYPICAL DAY: 10 OR MORE
TREMOR: NO TREMOR
PULSE: 76
PAROXYSMAL SWEATS: NO SWEAT VISIBLE
ANXIETY: NO ANXIETY, AT EASE
ANXIETY: NO ANXIETY, AT EASE
AUDITORY DISTURBANCES: NOT PRESENT
NAUSEA AND VOMITING: NO NAUSEA AND NO VOMITING
PAROXYSMAL SWEATS: NO SWEAT VISIBLE
HEADACHE, FULLNESS IN HEAD: NOT PRESENT
ORIENTATION AND CLOUDING OF SENSORIUM: ORIENTED AND CAN DO SERIAL ADDITIONS
HOW OFTEN DURING THE LAST YEAR HAVE YOU NEEDED AN ALCOHOLIC DRINK FIRST THING IN THE MORNING TO GET YOURSELF GOING AFTER A NIGHT OF HEAVY DRINKING: DAILY OR ALMOST DAILY
PAROXYSMAL SWEATS: NO SWEAT VISIBLE
HOW OFTEN DURING THE LAST YEAR HAVE YOU FOUND THAT YOU WERE NOT ABLE TO STOP DRINKING ONCE YOU HAD STARTED: DAILY OR ALMOST DAILY
NAUSEA AND VOMITING: NO NAUSEA AND NO VOMITING
HAS A RELATIVE, FRIEND, DOCTOR, OR ANOTHER HEALTH PROFESSIONAL EXPRESSED CONCERN ABOUT YOUR DRINKING OR SUGGESTED YOU CUT DOWN: YES, DURING THE LAST YEAR
AGITATION: NORMAL ACTIVITY
ANXIETY: NO ANXIETY, AT EASE
AUDITORY DISTURBANCES: NOT PRESENT
AUDITORY DISTURBANCES: NOT PRESENT
TREMOR: NO TREMOR
HEADACHE, FULLNESS IN HEAD: NOT PRESENT
AUDITORY DISTURBANCES: NOT PRESENT
TOTAL SCORE: 0
VISUAL DISTURBANCES: NOT PRESENT
ORIENTATION AND CLOUDING OF SENSORIUM: ORIENTED AND CAN DO SERIAL ADDITIONS
AUDITORY DISTURBANCES: NOT PRESENT
AGITATION: NORMAL ACTIVITY
PAROXYSMAL SWEATS: NO SWEAT VISIBLE
AGITATION: NORMAL ACTIVITY
AUDITORY DISTURBANCES: NOT PRESENT
AUDIT-C TOTAL SCORE: 12
ORIENTATION AND CLOUDING OF SENSORIUM: ORIENTED AND CAN DO SERIAL ADDITIONS
AGITATION: NORMAL ACTIVITY
ORIENTATION AND CLOUDING OF SENSORIUM: ORIENTED AND CAN DO SERIAL ADDITIONS
HOW OFTEN DURING THE LAST YEAR HAVE YOU BEEN UNABLE TO REMEMBER WHAT HAPPENED THE NIGHT BEFORE BECAUSE YOU HAD BEEN DRINKING: WEEKLY
ORIENTATION AND CLOUDING OF SENSORIUM: ORIENTED AND CAN DO SERIAL ADDITIONS
TREMOR: NO TREMOR
NAUSEA AND VOMITING: NO NAUSEA AND NO VOMITING
NAUSEA AND VOMITING: NO NAUSEA AND NO VOMITING
SKIP TO QUESTIONS 9-10: 0
NAUSEA AND VOMITING: NO NAUSEA AND NO VOMITING
NAUSEA AND VOMITING: NO NAUSEA AND NO VOMITING
TOTAL SCORE: 0
BLOOD PRESSURE: 150/84
ANXIETY: MILDLY ANXIOUS
TREMOR: NO TREMOR
AGITATION: NORMAL ACTIVITY
ANXIETY: NO ANXIETY, AT EASE
VISUAL DISTURBANCES: NOT PRESENT
NAUSEA AND VOMITING: NO NAUSEA AND NO VOMITING
HOW OFTEN DURING THE LAST YEAR HAVE YOU FAILED TO DO WHAT WAS NORMALLY EXPECTED FROM YOU BECAUSE OF DRINKING: NEVER
ORIENTATION AND CLOUDING OF SENSORIUM: ORIENTED AND CAN DO SERIAL ADDITIONS
PAROXYSMAL SWEATS: NO SWEAT VISIBLE
HAVE YOU OR SOMEONE ELSE BEEN INJURED AS A RESULT OF YOUR DRINKING: NO
HEADACHE, FULLNESS IN HEAD: NOT PRESENT
VISUAL DISTURBANCES: NOT PRESENT
AGITATION: NORMAL ACTIVITY
HEADACHE, FULLNESS IN HEAD: NOT PRESENT
VISUAL DISTURBANCES: NOT PRESENT
AUDITORY DISTURBANCES: NOT PRESENT
AUDIT TOTAL SCORE: 19

## 2025-05-02 ASSESSMENT — COGNITIVE AND FUNCTIONAL STATUS - GENERAL
MOBILITY SCORE: 24
DAILY ACTIVITIY SCORE: 24
PATIENT BASELINE BEDBOUND: NO
MOBILITY SCORE: 24
DAILY ACTIVITIY SCORE: 24

## 2025-05-02 ASSESSMENT — ACTIVITIES OF DAILY LIVING (ADL)
JUDGMENT_ADEQUATE_SAFELY_COMPLETE_DAILY_ACTIVITIES: YES
ADEQUATE_TO_COMPLETE_ADL: YES
GROOMING: INDEPENDENT
HEARING - RIGHT EAR: FUNCTIONAL
DRESSING YOURSELF: INDEPENDENT
LACK_OF_TRANSPORTATION: YES
LACK_OF_TRANSPORTATION: NO
TOILETING: INDEPENDENT
LACK_OF_TRANSPORTATION: NO
PATIENT'S MEMORY ADEQUATE TO SAFELY COMPLETE DAILY ACTIVITIES?: YES
WALKS IN HOME: INDEPENDENT
BATHING: INDEPENDENT
FEEDING YOURSELF: INDEPENDENT
HEARING - LEFT EAR: FUNCTIONAL

## 2025-05-02 ASSESSMENT — PAIN SCALES - GENERAL
PAINLEVEL_OUTOF10: 0 - NO PAIN

## 2025-05-02 ASSESSMENT — PATIENT HEALTH QUESTIONNAIRE - PHQ9
2. FEELING DOWN, DEPRESSED OR HOPELESS: NOT AT ALL
SUM OF ALL RESPONSES TO PHQ9 QUESTIONS 1 & 2: 0
1. LITTLE INTEREST OR PLEASURE IN DOING THINGS: NOT AT ALL

## 2025-05-02 ASSESSMENT — PAIN - FUNCTIONAL ASSESSMENT
PAIN_FUNCTIONAL_ASSESSMENT: 0-10

## 2025-05-02 NOTE — ASSESSMENT & PLAN NOTE
Episode of 45 seconds of aphasia while intoxicated.  He was admitted for stroke rule out.  NIH SS of 0 on evaluation.  No focal neurological deficits identified.  Status post aspirin 324 in the ED  CT head nonacute  MRI brain, MRA head and neck pending  Seizure and fall precautions  Neurology consulted

## 2025-05-02 NOTE — PROGRESS NOTES
Physical Therapy Screen                 Therapy Communication Note    Patient Name: Malik Evans  MRN: [id number]  Department: [address]  Room: 05/05-B  Today's Date: 5/2/2025     Discipline: Physical Therapy    Visit Reason: Screen    Comment: Order received and chart reviewed. Pt denied any concerns with functional mobility here or in his home environment. No acute care, skilled PT needs identified. Order completed and PT discontinued.

## 2025-05-02 NOTE — ED TRIAGE NOTES
Has history of etoh abuse was just in [facility] for etoh detox states he drinks everyday and he has noticed when he is sober he develops tremors,  about an hour ago he had sweats and was unable to talk x 1 minute. States symptoms are resolved he just wanted to get checked pout, states he would like to go back to w for inpatient detox if possible

## 2025-05-02 NOTE — H&P
History Of Present Illness  Malik Evans is a 65 y.o. male presenting with aphasia.    This is a 65-year-old male with past medical history of alcohol and tobacco use disorder, and other comorbidities presented to the ED intoxicated with chief complaint of aphasia.  He was with his buddies drinking when he experienced 45 seconds of aphasia, at that time he felt like he could not move and he was slightly diaphoretic.  He did report 7 or 8 months of intermittent jerking and tremor-like sensation when he runs out of alcohol.  Patient denies history of CVA/TIA, seizures, headache, lightheadedness, double vision, facial droop, upper or lower extremity weakness.  He also denies chest pain, shortness of breath, nausea, vomiting, diarrhea, fever or chills.    On admission vitals notable for pulse 113, /88, CMP unremarkable, magnesium 1.6, hemoglobin 12.7, hematocrit 36.7 UA noninfectious, UDS positive for cannabis    EKG: Sinus tachycardia, vent rate 114 bpm    CT head without IV contrast: Impression  No evidence of acute cortical infarct or intracranial hemorrhage.  Senescent changes. No change. If persistent concern, consider MRI     In the ED he received 2 L bolus of normal saline, aspirin 324.    Admitted for aphasia, rule out stroke.    Past Medical History  Medical History[1]    Surgical History  Surgical History[2]     Social History  He reports that he has been smoking cigarettes. He does not have any smokeless tobacco history on file. He reports current alcohol use. He reports current drug use. Drug: Marijuana.    Family History  Family History[3]     Allergies  Patient has no known allergies.    Review of Systems   General: no fatigue, no malaise, no fevers/chills   HENT: no rhinorrhea, no sore throat, no ear pain   Eyes: Denies any visual changes   Lungs: no SOB, no cough, no hemoptysis   CV: no chest pain, no palpitations, no leg edema   Abd: no nausea/vomiting, no constipation/diarrhea, no abdominal  pain   : no dysuria, no frequency, no nocturia, no flank pain   Endocrine: no polydipsia/polyuria, no hot or cold intolerance   Neuro: no headaches, no syncope, no seizures,brief aphasia and paralysis   MSK: no back pain, no neck pain, no joint problems   Psych: no anxiety, no depression, no hallucinations    Physical Exam   General: alert, no diaphoresis, intoxicated, on room air   HENT: mucous membranes dry, external ears normal, no rhinorrhea   Eyes: no icterus, bilateral injection of eyes, no discharge   Lungs: CTA BL   Heart: RRR, no murmurs, no LE edema BL   GI: abdomen soft, nontender, nondistended, BS present   MSK: no joint effusion or deformity   Skin: no rashes, erythema, or ecchymosis   Neuro: grossly normal cognition, motor strength, sensation, NIH SS of 0, no focal neurological deficits    Last Recorded Vitals  Blood pressure 136/78, pulse (!) 101, temperature 36.4 °C (97.5 °F), resp. rate 12, height 1.829 m (6'), weight 86.2 kg (190 lb), SpO2 97%.    Relevant Results  Results for orders placed or performed during the hospital encounter of 05/01/25 (from the past 24 hours)   CBC and Auto Differential   Result Value Ref Range    WBC 8.0 4.4 - 11.3 x10*3/uL    nRBC 0.0 0.0 - 0.0 /100 WBCs    RBC 3.90 (L) 4.50 - 5.90 x10*6/uL    Hemoglobin 12.7 (L) 13.5 - 17.5 g/dL    Hematocrit 36.7 (L) 41.0 - 52.0 %    MCV 94 80 - 100 fL    MCH 32.6 26.0 - 34.0 pg    MCHC 34.6 32.0 - 36.0 g/dL    RDW 13.0 11.5 - 14.5 %    Platelets 225 150 - 450 x10*3/uL    Neutrophils % 64.8 40.0 - 80.0 %    Immature Granulocytes %, Automated 0.4 0.0 - 0.9 %    Lymphocytes % 28.2 13.0 - 44.0 %    Monocytes % 4.9 2.0 - 10.0 %    Eosinophils % 1.2 0.0 - 6.0 %    Basophils % 0.5 0.0 - 2.0 %    Neutrophils Absolute 5.19 1.20 - 7.70 x10*3/uL    Immature Granulocytes Absolute, Automated 0.03 0.00 - 0.70 x10*3/uL    Lymphocytes Absolute 2.26 1.20 - 4.80 x10*3/uL    Monocytes Absolute 0.39 0.10 - 1.00 x10*3/uL    Eosinophils Absolute 0.10  0.00 - 0.70 x10*3/uL    Basophils Absolute 0.04 0.00 - 0.10 x10*3/uL   Comprehensive metabolic panel   Result Value Ref Range    Glucose 142 (H) 74 - 99 mg/dL    Sodium 143 136 - 145 mmol/L    Potassium 3.6 3.5 - 5.3 mmol/L    Chloride 106 98 - 107 mmol/L    Bicarbonate 24 21 - 32 mmol/L    Anion Gap 17 10 - 20 mmol/L    Urea Nitrogen 21 6 - 23 mg/dL    Creatinine 0.91 0.50 - 1.30 mg/dL    eGFR >90 >60 mL/min/1.73m*2    Calcium 8.6 8.6 - 10.3 mg/dL    Albumin 3.8 3.4 - 5.0 g/dL    Alkaline Phosphatase 60 33 - 136 U/L    Total Protein 6.2 (L) 6.4 - 8.2 g/dL    AST 22 9 - 39 U/L    Bilirubin, Total 0.2 0.0 - 1.2 mg/dL    ALT 21 10 - 52 U/L   Ethanol   Result Value Ref Range    Alcohol 152 (H) <=10 mg/dL   Troponin I, High Sensitivity   Result Value Ref Range    Troponin I, High Sensitivity 7 0 - 20 ng/L   Magnesium   Result Value Ref Range    Magnesium 1.60 1.60 - 2.40 mg/dL   Drug Screen, Urine   Result Value Ref Range    Amphetamine Screen, Urine Presumptive Negative Presumptive Negative    Barbiturate Screen, Urine Presumptive Negative Presumptive Negative    Benzodiazepines Screen, Urine Presumptive Negative Presumptive Negative    Cannabinoid Screen, Urine Presumptive Positive (A) Presumptive Negative    Cocaine Metabolite Screen, Urine Presumptive Negative Presumptive Negative    Fentanyl Screen, Urine Presumptive Negative Presumptive Negative    Opiate Screen, Urine Presumptive Negative Presumptive Negative    Oxycodone Screen, Urine Presumptive Negative Presumptive Negative    PCP Screen, Urine Presumptive Negative Presumptive Negative    Methadone Screen, Urine Presumptive Negative Presumptive Negative   Urinalysis with Reflex Culture and Microscopic   Result Value Ref Range    Color, Urine Light-Yellow Light-Yellow, Yellow, Dark-Yellow    Appearance, Urine Clear Clear    Specific Gravity, Urine 1.022 1.005 - 1.035    pH, Urine 5.0 5.0, 5.5, 6.0, 6.5, 7.0, 7.5, 8.0    Protein, Urine NEGATIVE NEGATIVE, 10  (TRACE), 20 (TRACE) mg/dL    Glucose, Urine Normal Normal mg/dL    Blood, Urine NEGATIVE NEGATIVE mg/dL    Ketones, Urine TRACE (A) NEGATIVE mg/dL    Bilirubin, Urine NEGATIVE NEGATIVE mg/dL    Urobilinogen, Urine Normal Normal mg/dL    Nitrite, Urine NEGATIVE NEGATIVE    Leukocyte Esterase, Urine NEGATIVE NEGATIVE   Drug Screen, Urine With Reflex to Confirmation   Result Value Ref Range    Amphetamine Screen, Urine Presumptive Negative Presumptive Negative    Barbiturate Screen, Urine Presumptive Negative Presumptive Negative    Benzodiazepines Screen, Urine Presumptive Negative Presumptive Negative    Cannabinoid Screen, Urine Presumptive Positive (A) Presumptive Negative    Cocaine Metabolite Screen, Urine Presumptive Negative Presumptive Negative    Fentanyl Screen, Urine Presumptive Negative Presumptive Negative    Opiate Screen, Urine Presumptive Negative Presumptive Negative    Oxycodone Screen, Urine Presumptive Negative Presumptive Negative    PCP Screen, Urine Presumptive Negative Presumptive Negative    Methadone Screen, Urine Presumptive Negative Presumptive Negative         This is a 65-year-old male with alcohol use disorder presented to the ED intoxicated with chief complaint of aphasia.  CT head nonacute, he was admitted for stroke workup.  CIWA, neurology, MRI, MRA pending  Assessment & Plan  Aphasia  Episode of 45 seconds of aphasia while intoxicated.  He was admitted for stroke rule out.  NIH SS of 0 on evaluation.  No focal neurological deficits identified.  Status post aspirin 324 in the ED  CT head nonacute  MRI brain, MRA head and neck pending  Seizure and fall precautions  Neurology consulted    Alcoholic intoxication without complication  Alcohol use disorder  On admission acohol level of 152, had a bottle of vodka visible in his duffel bag.  Drinks about 1 pint of vodka per day  Recommend alcohol cessation  CIWA protocol initiated  Thiamine folate      Tachycardia  Likely secondary to  alcohol withdrawal  Telemetry monitoring  Continue to monitor    Tobacco use disorder  Smokes half pack a day, likely 10-pack-year smoking history  Recommend smoking cessation      DVT prophylaxis: Enoxaparin    CODE STATUS: Full code    I spent 60 minutes in the professional and overall care of this patient.      Mikhail Estrada MD         [1]   Past Medical History:  Diagnosis Date    Alcohol use     Depression     Hypertension    [2] History reviewed. No pertinent surgical history.  [3]   Family History  Problem Relation Name Age of Onset    Hypertension Other      Heart disease Other

## 2025-05-02 NOTE — ED PROVIDER NOTES
Department of Emergency Medicine   ED  Provider Note  Admit Date/RoomTime: 5/1/2025  8:56 PM  ED Room: 05/05        History of Present Illness:  Chief Complaint   Patient presents with    Unable to speak         Malik Evans is a 65 y.o. male history of alcohol abuse alcohol intoxication presents to the emergency department with complaints of difficulty speaking for about 45 seconds.  Patient states that he has been drinking today about 7-8 drinks went to talk to somebody and could not spit out his words for about 45 seconds.  Reports at the time he was sweaty.  He denies any difficulty moving his arms or legs no headache no fall or injury no fever chills cough congestion runny nose sore throat no abdominal pain no nausea vomiting or diarrhea.  Patient states for the last 8 months he is also noticed some intermittent jerking and tremor-like sensations when he is not drinking alcohol.  Patient denies any jerking sensations at this time.  And is not having any difficulty talking.  He is also requesting to go to alcohol detox reports he was at detox about 3 weeks ago and relapsed.  He does drink daily several drinks throughout the day  Patient thinks that some of his symptoms may be related to alcohol withdrawal    Review of Systems:   Pertinent positives and negatives are stated within HPI, all other systems reviewed and are negative.        --------------------------------------------- PAST HISTORY ---------------------------------------------  Past Medical History:  has a past medical history of Alcohol use, Depression, and Hypertension.  Past Surgical History:  has no past surgical history on file.  Social History:  reports that he has been smoking cigarettes. He does not have any smokeless tobacco history on file. He reports current alcohol use. He reports current drug use. Drug: Marijuana.  Family History: family history includes Heart disease in an other family member; Hypertension in an other family  member.. Unless otherwise noted, family history is non contributory  The patient’s home medications have been reviewed.  Allergies: Patient has no known allergies.        ---------------------------------------------------PHYSICAL EXAM--------------------------------------    GENERAL APPEARANCE: Awake and alert.  Smells of alcohol  VITAL SIGNS: As per the nurses' triage record.  Tachycardic  HEENT: Normocephalic, atraumatic.  No raccoon eyes or bush signs no epistaxis noted no bite to the tongue or lip.  Extraocular muscles are intact. Pupils equal round and reactive to light. Conjunctiva bloodshot.  Negative scleral icterus. Mucous membranes are moist. Tongue in the midline. Pharynx was without erythema or exudates, uvula midline  NECK: Soft Nontender and supple, full gross ROM, no meningeal signs.  CHEST: Nontender to palpation. Clear to auscultation bilaterally. No rales, rhonchi, or wheezing.   HEART: S1, S2. Regular rate and rhythm. No murmurs, gallops or rubs.  Strong and equal pulses in the extremities.   ABDOMEN: Soft, nontender, nondistended, positive bowel sounds, no palpable masses.  MUSCULCSKELETAL: The calves are nontender to palpation. Full gross active range of motion. Ambulating on own with no acute difficulties  NEUROLOGICAL: Awake, alert and oriented x 3. Power intact in the upper and lower extremities. Sensation is intact to light touch in the upper and lower extremities.  NIH 0.  Test of skew negative Van negative.  Pushes and pulls are equal and strong.  Facial movement is symmetrical.  No ataxia noted.  IMMUNOLOGICAL: No lymphatic streaking noted   DERM: No petechiae, rashes, or ecchymoses.          ------------------------- NURSING NOTES AND VITALS REVIEWED ---------------------------  The nursing notes within the ED encounter and vital signs as below have been reviewed by myself  /80   Pulse (!) 109   Temp 37.2 °C (99 °F) (Temporal)   Resp 12   Ht 1.829 m (6')   Wt 86.2 kg (190  lb)   SpO2 95%   BMI 25.77 kg/m²     Oxygen Saturation Interpretation: 95% room air    The cardiac monitor revealed sinus tachycardia with a heart rate in the 110s as interpreted by me. The cardiac monitor was ordered secondary to the patient's heart rate and to monitor the patient for dysrhythmia.       The patient’s available past medical records and past encounters were reviewed.          -----------------------DIAGNOSTIC RESULTS------------------------  LABS:    Labs Reviewed   CBC WITH AUTO DIFFERENTIAL - Abnormal       Result Value    WBC 8.0      nRBC 0.0      RBC 3.90 (*)     Hemoglobin 12.7 (*)     Hematocrit 36.7 (*)     MCV 94      MCH 32.6      MCHC 34.6      RDW 13.0      Platelets 225      Neutrophils % 64.8      Immature Granulocytes %, Automated 0.4      Lymphocytes % 28.2      Monocytes % 4.9      Eosinophils % 1.2      Basophils % 0.5      Neutrophils Absolute 5.19      Immature Granulocytes Absolute, Automated 0.03      Lymphocytes Absolute 2.26      Monocytes Absolute 0.39      Eosinophils Absolute 0.10      Basophils Absolute 0.04     COMPREHENSIVE METABOLIC PANEL - Abnormal    Glucose 142 (*)     Sodium 143      Potassium 3.6      Chloride 106      Bicarbonate 24      Anion Gap 17      Urea Nitrogen 21      Creatinine 0.91      eGFR >90      Calcium 8.6      Albumin 3.8      Alkaline Phosphatase 60      Total Protein 6.2 (*)     AST 22      Bilirubin, Total 0.2      ALT 21     DRUG SCREEN,URINE - Abnormal    Amphetamine Screen, Urine Presumptive Negative      Barbiturate Screen, Urine Presumptive Negative      Benzodiazepines Screen, Urine Presumptive Negative      Cannabinoid Screen, Urine Presumptive Positive (*)     Cocaine Metabolite Screen, Urine Presumptive Negative      Fentanyl Screen, Urine Presumptive Negative      Opiate Screen, Urine Presumptive Negative      Oxycodone Screen, Urine Presumptive Negative      PCP Screen, Urine Presumptive Negative      Methadone Screen, Urine  Presumptive Negative      Narrative:     Drug screen results are presumptive and should not be used to assess   compliance with prescribed medication. Contact the performing [facility] laboratory   to add-on definitive confirmatory testing if clinically indicated.    Toxicology screening results are reported qualitatively. The concentration must   be greater than or equal to the cutoff to be reported as positive. The concentration   at which the screening test can detect an individual drug or metabolite varies.   The absence of expected drug(s) and/or drug metabolite(s) may indicate non-compliance,   inappropriate timing of specimen collection relative to drug administration, poor drug   absorption, diluted/adulterated urine, or limitations of testing. For medical purposes   only; not valid for forensic use.    Interpretive questions should be directed to the laboratory medical directors.   ALCOHOL - Abnormal    Alcohol 152 (*)    URINALYSIS WITH REFLEX CULTURE AND MICROSCOPIC - Abnormal    Color, Urine Light-Yellow      Appearance, Urine Clear      Specific Gravity, Urine 1.022      pH, Urine 5.0      Protein, Urine NEGATIVE      Glucose, Urine Normal      Blood, Urine NEGATIVE      Ketones, Urine TRACE (*)     Bilirubin, Urine NEGATIVE      Urobilinogen, Urine Normal      Nitrite, Urine NEGATIVE      Leukocyte Esterase, Urine NEGATIVE     TROPONIN I, HIGH SENSITIVITY - Normal    Troponin I, High Sensitivity 7      Narrative:     Less than 99th percentile of normal range cutoff-  Female and children under 18 years old <14 ng/L; Male <21 ng/L: Negative  Repeat testing should be performed if clinically indicated.     Female and children under 18 years old 14-50 ng/L; Male 21-50 ng/L:  Consistent with possible cardiac damage and possible increased clinical   risk. Serial measurements may help to assess extent of myocardial damage.     >50 ng/L: Consistent with cardiac damage, increased clinical risk and  myocardial  infarction. Serial measurements may help assess extent of   myocardial damage.      NOTE: Children less than 1 year old may have higher baseline troponin   levels and results should be interpreted in conjunction with the overall   clinical context.     NOTE: Troponin I testing is performed using a different   testing methodology at [facility] than at other   [facility]. Direct result comparisons should only   be made within the same method.   MAGNESIUM - Normal    Magnesium 1.60     URINALYSIS WITH REFLEX CULTURE AND MICROSCOPIC    Narrative:     The following orders were created for panel order Urinalysis with Reflex Culture and Microscopic.  Procedure                               Abnormality         Status                     ---------                               -----------         ------                     Urinalysis with Reflex C...[[id number]]  Abnormal            Final result               Extra Urine Gray Tube[[id number]]                                                         Please view results for these tests on the individual orders.   EXTRA URINE GRAY TUBE       As interpreted by me, the above displayed labs are abnormal. All other labs obtained during this visit were within normal range or not returned as of this dictation.      EKG Interpretation  2114 EKG on my interpretation shows sinus tachycardia, rate of 114 bpm.  Normal axis.  QTc is 446 ms, OH interval 176.  No ST elevation or depression, no acute ischemic pattern.  No STEMI.  Tachycardic, otherwise normal EKG. [NT]           CT head wo IV contrast   Final Result   No evidence of acute cortical infarct or intracranial hemorrhage.        Senescent changes. No change. If persistent concern, consider MRI        MACRO:   None        Signed by: Zay Bhakta 5/1/2025 11:36 PM   Dictation workstation:   [id number]              CT head wo IV contrast   Final Result   No evidence of acute cortical infarct or intracranial  hemorrhage.        Senescent changes. No change. If persistent concern, consider MRI        MACRO:   None        Signed by: Zay Bhakta 5/1/2025 11:36 PM   Dictation workstation:   [id number]              ------------------------------ ED COURSE/MEDICAL DECISION MAKING----------------------  Medical Decision Making:   Exam: A medically appropriate exam performed, outlined above, given the known history and presentation.    History obtained from: Review of medical record nursing notes patient      Social Determinants of Health considered during this visit: Takes care of himself at home      PAST MEDICAL HISTORY/Chronic Conditions Affecting Care     has a past medical history of Alcohol use, Depression, and Hypertension.       CC/HPI Summary, Social Determinants of health, Records Reviewed, DDx, testing done/not done, ED Course, Reassessment, disposition considerations/shared decision making with patient, consults, disposition:   Presents with difficulty speaking request for alcohol detox tremors onset of symptoms 6 to 8 months  Plan  CBC, CMP, urine, CIWA protocol, folate, thiamine, multivitamin, Ativan, normal saline, CT head, EKG, drug screen, alcohol, troponin    Medical Decision Making/Differential Diagnosis:  Differentials include but not limited to alcohol intoxication versus alcohol withdrawal versus seizure versus electrolyte abnormality versus dehydration patient is NIH of 0 at this time.  Smells of alcohol low suspicion for stroke versus arrhythmia  Review  Magnesium 1.6  Alcohol 152  White blood cell count 8  Hemoglobin 12.7  Troponin 7  Glucose 142  Electrolytes unremarkable  Normal renal function  Normal LFTs  Urine unremarkable trace ketones  Patient presents to the emergency department with difficulty speaking for 45 seconds also reports to drinking alcohol requesting alcohol detox.  On clinical exam patient is neurologically intact NIH is 0.  Test of skew negative Van negative no ataxia pushes and  pulls are equal and strong speech is clear.  Patient's TIA risk score is 3.  Alcohol level 152.  No elevation white blood cell count indicate infection.  EKG per attending note sinus tachycardia no ST elevation or arrhythmia troponin within normal limits no complaints of chest pain.  Glucose 142.  Electrolytes unremarkable.  Normal renal function.  Normal LFTs.  Hemoglobin 12.7 no signs of active bleeding.  Urine unremarkable.  Drug screen positive marijuana    patient is tachycardic here in the emergency department.  No signs of sepsis or toxicity.  Suspect suspect this is secondary to dehydration and alcohol related.  Patient did receive IV fluids.  Patient is on [facility] protocol for concerns of possible alcohol withdrawal as well.  Received 1 dose of Ativan and monitor closely.  Based on patient's clinical presentation history and symptoms consistent with  Aphasia resolved.  Alcohol intoxication  Patient seen and evaluated with attending physician Dr. Murphy     Consultation to neurology team via [employer] chat recommend admission ED stroke orders placed  Please see attending note for final impression disposition he will be assuming care of patient at this time due to the end of my shift no signs of sepsis or toxicity patient amenable plan amenable to admission    PROCEDURES  Unless otherwise noted below, none      CONSULTS:   None  Consultation to neurology team via [employer] awaiting response    ED Course as of 05/02/25 0159   u May 01, 2025   2114 EKG on my interpretation shows sinus tachycardia, rate of 114 bpm.  Normal axis.  QTc is 446 ms, MI interval 176.  No ST elevation or depression, no acute ischemic pattern.  No STEMI.  Tachycardic, otherwise normal EKG. [NT]   Fri May 02, 2025   0114 Discussed with neurology team Dr. Chong   Advised admission err on the side of caution for TIA stroke evaluation [TB]   0159 Patient had discussed with attending physician stating when he was having episodes where he could not  talk he was also experiencing difficulty moving all of his extremities.  Initially had reported that he would had no difficulty moving his arms and legs [TB]      ED Course User Index  [NT] Saturnino Worthington DO  [TB] CORTEZ Cuello         Diagnoses as of 05/02/25 0159   Alcoholic intoxication without complication   Tachycardia   Aphasia         This patient has remained hemodynamically stable during their ED course.      Critical Care: none        Counseling:  The emergency provider has spoken with the patient and discussed today’s results, in addition to providing specific details for the plan of care and counseling regarding the diagnosis and prognosis.  Questions are answered at this time and they are agreeable with the plan.         --------------------------------- IMPRESSION AND DISPOSITION ---------------------------------    IMPRESSION  1. Alcoholic intoxication without complication    2. Tachycardia    3. Aphasia        DISPOSITION  Disposition: Pending  Patient condition is stable        NOTE: This report was transcribed using voice recognition software. Every effort was made to ensure accuracy; however, inadvertent computerized transcription errors may be present      CORTEZ Cuello  05/02/25 0029       CORTEZ Cuello  05/02/25 0159

## 2025-05-02 NOTE — PROGRESS NOTES
Occupational Therapy                 Therapy Communication Note; OT screen    Patient Name: Malik Evans  MRN: [id number]  Department: [facility] 3 E  Room: 05/05-B  Today's Date: 5/2/2025     Discipline: Occupational Therapy          Comment (OT orders received and patient chart reviewed.  pt was observed up in room independently.  I spoke to the patient and he is independent with ADLs and functional mobility.  He is stating he is at baseline level of function and has no needs for skilled OT at this time

## 2025-05-02 NOTE — PROGRESS NOTES
Speech-Language Pathology    Speech-Language Pathology Clinical Swallow Evaluation    Patient Name: Malik Evans  MRN: [id number]  : 1960  Today's Date: 25  Start Time: 1325  Stop Time: 1334  Time Calculation (min): 9 min      ASSESSMENT  Impressions:  functional oral phase and no suspected pharyngeal phase dysphagia based on clinical swallow evaluation. No further skilled dysphagia treatment recommended  Prognosis: Good    PLAN  RECOMMENDATIONS:  Is MBSS recommended? No; no pharyngeal dysphagia suspected.  Solid consistency: Regular (IDDSI level 7)  Liquid consistency: Thin (IDDSI 0)  Medication administration: Whole in thin liquid    Recommended frequency/duration:  Skilled SLP services recommended: No  Discharge recommendation: Home with no further SLP     Strengths: Cognition  Barriers to participation in tx: N/A    SUBJECTIVE    PMHx relevant to rehab: past medical history of alcohol and tobacco use disorder,    Chief complaint: Pt was admitted on 25 presented to the ED intoxicated with chief complaint of aphasia. Episode of 45 seconds of aphasia while intoxicated. He was admitted for stroke rule out. NIH SS of 0 on evaluation.  Chief Complaint   Patient presents with    Unable to speak   . He was found to have Aphasia.  On admission acohol level of 152, had a bottle of vodka visible in his duffel bag.  Drinks about 1 pint of vodka per day  Recommend alcohol cessation  CIWA protocol initiated  Thiamine folate  Relevant imaging results:  CT head 25  IMPRESSION:  No evidence of acute cortical infarct or intracranial hemorrhage.      Senescent changes. No change. If persistent concern, consider MRI    General Visit Information:     Patient Class: Inpatient  Living Environment: Home     Reason for Referral: assess swallow per CVA protocol  Prior to Session Communication: Bedside nurse    RN cleared pt to participate in session and reported no difficulty taking pills    Pt reported no  difficulty with swallowing, speech back to baseline    Date of Onset: 05/01/25  Date of Order: 05/02/25  [employer] Diet: regular, thin liquids  Current Diet : regular , thin liquids    Status at time of evaluation:  Pain Assessment  Pain Assessment: 0-10  0-10 (Numeric) Pain Score: 0 - No pain    Pt was alert, pleasant, and cooperative for session.  Orientation: Oriented to situation and Ox4  Ability to follow functional commands: WFL  Nutritional status: Appears well-nourished/no concerns    Respiratory status: Room air  Baseline Vocal Quality: Normal  Volitional Cough: Strong  Volitional Swallow: Within Functional Limits  Patient positioning: Upright in bed    OBJECTIVE  Clinical swallow evaluation completed and consisted of interview, oral motor assessment, and PO trials (4 4oz thin liquids, 4 oz pudding and 2 luisito crackers).    ORAL PHASE: Natural dentition in adequate condition. Oral mucosa were pink, moist, and free of obvious lesions. Lingual strength and ROM were WFL. Labial strength/ROM were WFL. Labial seal was adequate. Mastication of regular solids was WFL A/P transit and oral clearance were WFL.    PHARYNGEAL PHASE: Laryngeal elevation was visualized or palpated with all trials, however adequacy of hyolaryngeal elevation/excursion cannot be determined at bedside. No immediate or delayed s/sx aspiration/penetration were observed with any consistencies.    Was 3oz challenge administered: Yes; pt drank 3oz of thin liquid in one attempt, without breaking, with no overt s/sx aspiration/penetration observed.     Treatment/Education:  Results and recommendations were relayed to: Patient and Bedside nurse  Education provided: Yes   Learner: Patient   Barriers to learning: None   Method of teaching: Verbal   Topic: role of ST and results of assessment   Outcome of teaching: Pt verbalized understanding  Treatment provided: No

## 2025-05-02 NOTE — ASSESSMENT & PLAN NOTE
On admission acohol level of 152, had a bottle of vodka visible in his duffel bag.  Drinks about 1 pint of vodka per day  Recommend alcohol cessation  [facility] protocol initiated  Thiamine folate

## 2025-05-02 NOTE — PROGRESS NOTES
Malik Evans is a 65 y.o. male on day 0 of admission presenting with Aphasia.    Subjective   He was admitted after an episode of word finding difficulty that lasted between 45 seconds and 1 minute and then resolved.  Imaging of the head by CT was unremarkable.  An MRI of the brain and MRA of the head were also unremarkable.  He has no acute complaints.  He said he has experienced intermittent twitches that occur less than once a day and lasts 1 or 2 seconds.    Objective     Physical Exam  General: awake, alert, oriented, responsive  Cardiovascular: regular, normal S1 and S2  Lungs: good air entry bilaterally, clear to auscultation  Extremities: no peripheral cyanosis, no pedal edema  Neuro: alert, oriented x 3, no focal weakness      Last Recorded Vitals  Blood pressure 146/77, pulse 92, temperature 37.1 °C (98.8 °F), temperature source Oral, resp. rate 18, height 1.829 m (6'), weight 86.2 kg (190 lb), SpO2 98%.  Intake/Output last 3 Shifts:  I/O last 3 completed shifts:  In: 2000 (23.2 mL/kg) [IV Piggyback:2000]  Out: - (0 mL/kg)   Weight: 86.2 kg     Relevant Results  Lab Results   Component Value Date    WBC 9.6 05/02/2025    HGB 11.6 (L) 05/02/2025    HCT 34.4 (L) 05/02/2025    MCV 95 05/02/2025     05/02/2025     Lab Results   Component Value Date    GLUCOSE 94 05/02/2025    CALCIUM 8.0 (L) 05/02/2025     05/02/2025    K 3.7 05/02/2025    CO2 25 05/02/2025     05/02/2025    BUN 14 05/02/2025    CREATININE 0.77 05/02/2025     Scheduled medications  Scheduled Medications[1]  Continuous medications  Continuous Medications[2]  PRN medications  PRN Medications[3]    Assessment & Plan    Expressive aphasia/speech disturbance  Episode of 45 seconds of aphasia while intoxicated.  He was admitted for stroke rule out.  NIH SS of 0 on evaluation.    MRI of the brain and MRI of the head were unremarkable  Seizure precautions and fall precautions  Await  neurology input    Alcoholic intoxication  without complication  Alcohol use disorder  On admission acohol level of 152, had a bottle of vodka visible in his duffel bag.  Drinks about 1 pint of vodka per day  Alcohol cessation was recommended   [facility] protocol  Thiamine, folate    Tachycardia  Likely secondary to alcohol withdrawal  Resolved    Tobacco use  Smokes half pack a day, likely 10-pack-year smoking history  Recommend smoking cessation    Depression  On desvenlafaxine, Abilify    Plan  Await input by neurology      John Rothman MD         [1] ARIPiprazole, 5 mg, oral, Daily  cloNIDine, 0.1 mg, oral, BID  desvenlafaxine, 100 mg, oral, Daily  enoxaparin, 40 mg, subcutaneous, Daily  famotidine, 20 mg, oral, BID  folic acid, 1 mg, oral, Daily  lamoTRIgine, 100 mg, oral, BID  mirtazapine, 45 mg, oral, Nightly  multivitamin with minerals, 1 tablet, oral, Daily  polyethylene glycol, 17 g, oral, Daily  [START ON 5/5/2025] thiamine, 100 mg, oral, Daily  thiamine, 100 mg, intravenous, Daily  [2]    [3] PRN medications: acetaminophen **OR** acetaminophen **OR** acetaminophen, hydrOXYzine HCL, LORazepam **OR** LORazepam **OR** LORazepam, melatonin, ondansetron ODT **OR** ondansetron

## 2025-05-02 NOTE — ED PROVIDER NOTES
Supervisory note:  Patient seen in conjunction with Felicia Wright NP.  Patient presents after an episode in which he was unable to speak or move for 45 seconds.  Nothing like this has happened previously.  He reports that over the last number of months, he has been having some shakes.  He now feels back to normal.  Patient does endorse drinking alcohol daily.  On examination, patient is awake and alert.  He answers questions appropriately.  Strength is intact in all extremities.  There is 1+ pretibial edema of bilateral lower extremities.    Patient now back at neurological baseline.  Head CT is unremarkable.  Laboratory studies reveal elevated alcohol level but are otherwise unremarkable.  Patient's case was discussed with neurology, Dr. Chong, who recommends further workup for possible CVA.  Patient accepted to medicine service.  Patient was given dose of aspirin.    I personally saw the patient and made/approved the management plan and take responsiblity for the patient management.  Parts of this chart were completed with dictation software, please excuse any errors in transcription.            Nitin Murphy MD  05/02/25 [id number]

## 2025-05-02 NOTE — ASSESSMENT & PLAN NOTE
On admission acohol level of 152, had a bottle of vodka visible in his duffel bag.  Drinks about 1 pint of vodka per day  Recommend alcohol cessation  [facility] protocol initiated  Thiamine folate

## 2025-05-02 NOTE — ASSESSMENT & PLAN NOTE
On admission acohol level of 152, had a bottle of vodka visible in his duffel bag.  Drinks about 1 pint of vodka per day  Recommend alcohol cessation  [facility] protocol initiated  Thiamine folate

## 2025-05-02 NOTE — ASSESSMENT & PLAN NOTE
On admission acohol level of 152, had a bottle of vodka visible in his duffel bag.  Drinks about 1 pint of vodka per day  Recommend alcohol cessation  [facility] protocol initiated  Thiamine folate

## 2025-05-02 NOTE — CONSULTS
Inpatient consult to Neurology  Consult performed by: Chani Chong MD  Consult ordered by: Mikhail Estrada MD          History Of Present Illness  Malik Evans is a 65 y.o. male presenting with an episode of aphasia concerning for possible stroke.  The patient reports that he was in his usual state of health until yesterday with his friend when for about 45 seconds he absolutely could not speak.  Friend was with him and also noticed the same.  No other focal neurologic deficits.  This is never happened before or since.  He is currently back to his normal baseline today.     Past Medical History  He has a past medical history of Alcohol use, Depression, and Hypertension.    Surgical History  He has no past surgical history on file.     Social History  He reports that he has been smoking cigarettes. He does not have any smokeless tobacco history on file. He reports current alcohol use. He reports current drug use. Drug: Marijuana.     Allergies  Patient has no known allergies.    Medications  Prescriptions Prior to Admission[1]  Review of Systems    Scheduled medications  Scheduled Medications[2]  Continuous medications  Continuous Medications[3]  PRN medications  PRN Medications[4]    Last Recorded Vitals   Blood pressure 127/79, pulse 87, temperature 36 °C (96.8 °F), temperature source Oral, resp. rate 18, height 1.829 m (6'), weight 86.2 kg (190 lb), SpO2 97%.    Heart is RRR, Lungs CTAB, poor dentition  Neurologically, pt is awake and oriented x4. Attention, concentration, memory, cortical processing are intact. No aphasia  Cranial nerves: VFF, EOMI, No nystagmus, Face is symmetric to sensory and motor, no dysarthria, Tongue protrudes midline, Shrug symmetric  Motor: 5/5 strength B throughout, no tremor or asterixis  Sensation is intact bilaterally throughout  Coordination: no ataxia, no dysmetria/dysdiadochokinesia  DTR symmetric  Gait unable to assess    Relevant Results    Results for orders placed or  performed during the hospital encounter of 05/01/25 (from the past 96 hours)   CBC and Auto Differential   Result Value Ref Range    WBC 8.0 4.4 - 11.3 x10*3/uL    nRBC 0.0 0.0 - 0.0 /100 WBCs    RBC 3.90 (L) 4.50 - 5.90 x10*6/uL    Hemoglobin 12.7 (L) 13.5 - 17.5 g/dL    Hematocrit 36.7 (L) 41.0 - 52.0 %    MCV 94 80 - 100 fL    MCH 32.6 26.0 - 34.0 pg    MCHC 34.6 32.0 - 36.0 g/dL    RDW 13.0 11.5 - 14.5 %    Platelets 225 150 - 450 x10*3/uL    Neutrophils % 64.8 40.0 - 80.0 %    Immature Granulocytes %, Automated 0.4 0.0 - 0.9 %    Lymphocytes % 28.2 13.0 - 44.0 %    Monocytes % 4.9 2.0 - 10.0 %    Eosinophils % 1.2 0.0 - 6.0 %    Basophils % 0.5 0.0 - 2.0 %    Neutrophils Absolute 5.19 1.20 - 7.70 x10*3/uL    Immature Granulocytes Absolute, Automated 0.03 0.00 - 0.70 x10*3/uL    Lymphocytes Absolute 2.26 1.20 - 4.80 x10*3/uL    Monocytes Absolute 0.39 0.10 - 1.00 x10*3/uL    Eosinophils Absolute 0.10 0.00 - 0.70 x10*3/uL    Basophils Absolute 0.04 0.00 - 0.10 x10*3/uL   Comprehensive metabolic panel   Result Value Ref Range    Glucose 142 (H) 74 - 99 mg/dL    Sodium 143 136 - 145 mmol/L    Potassium 3.6 3.5 - 5.3 mmol/L    Chloride 106 98 - 107 mmol/L    Bicarbonate 24 21 - 32 mmol/L    Anion Gap 17 10 - 20 mmol/L    Urea Nitrogen 21 6 - 23 mg/dL    Creatinine 0.91 0.50 - 1.30 mg/dL    eGFR >90 >60 mL/min/1.73m*2    Calcium 8.6 8.6 - 10.3 mg/dL    Albumin 3.8 3.4 - 5.0 g/dL    Alkaline Phosphatase 60 33 - 136 U/L    Total Protein 6.2 (L) 6.4 - 8.2 g/dL    AST 22 9 - 39 U/L    Bilirubin, Total 0.2 0.0 - 1.2 mg/dL    ALT 21 10 - 52 U/L   Ethanol   Result Value Ref Range    Alcohol 152 (H) <=10 mg/dL   Troponin I, High Sensitivity   Result Value Ref Range    Troponin I, High Sensitivity 7 0 - 20 ng/L   Magnesium   Result Value Ref Range    Magnesium 1.60 1.60 - 2.40 mg/dL   ECG 12 lead   Result Value Ref Range    Ventricular Rate 114 BPM    Atrial Rate 114 BPM    NJ Interval 178 ms    QRS Duration 90 ms    QT  Interval 324 ms    QTC Calculation(Bazett) 446 ms    P Axis 62 degrees    R Axis 59 degrees    T Axis 58 degrees    QRS Count 18 beats    Q Onset 220 ms    P Onset 131 ms    P Offset 187 ms    T Offset 382 ms    QTC Fredericia 401 ms   Drug Screen, Urine   Result Value Ref Range    Amphetamine Screen, Urine Presumptive Negative Presumptive Negative    Barbiturate Screen, Urine Presumptive Negative Presumptive Negative    Benzodiazepines Screen, Urine Presumptive Negative Presumptive Negative    Cannabinoid Screen, Urine Presumptive Positive (A) Presumptive Negative    Cocaine Metabolite Screen, Urine Presumptive Negative Presumptive Negative    Fentanyl Screen, Urine Presumptive Negative Presumptive Negative    Opiate Screen, Urine Presumptive Negative Presumptive Negative    Oxycodone Screen, Urine Presumptive Negative Presumptive Negative    PCP Screen, Urine Presumptive Negative Presumptive Negative    Methadone Screen, Urine Presumptive Negative Presumptive Negative   Urinalysis with Reflex Culture and Microscopic   Result Value Ref Range    Color, Urine Light-Yellow Light-Yellow, Yellow, Dark-Yellow    Appearance, Urine Clear Clear    Specific Gravity, Urine 1.022 1.005 - 1.035    pH, Urine 5.0 5.0, 5.5, 6.0, 6.5, 7.0, 7.5, 8.0    Protein, Urine NEGATIVE NEGATIVE, 10 (TRACE), 20 (TRACE) mg/dL    Glucose, Urine Normal Normal mg/dL    Blood, Urine NEGATIVE NEGATIVE mg/dL    Ketones, Urine TRACE (A) NEGATIVE mg/dL    Bilirubin, Urine NEGATIVE NEGATIVE mg/dL    Urobilinogen, Urine Normal Normal mg/dL    Nitrite, Urine NEGATIVE NEGATIVE    Leukocyte Esterase, Urine NEGATIVE NEGATIVE   Drug Screen, Urine With Reflex to Confirmation   Result Value Ref Range    Amphetamine Screen, Urine Presumptive Negative Presumptive Negative    Barbiturate Screen, Urine Presumptive Negative Presumptive Negative    Benzodiazepines Screen, Urine Presumptive Negative Presumptive Negative    Cannabinoid Screen, Urine Presumptive  Positive (A) Presumptive Negative    Cocaine Metabolite Screen, Urine Presumptive Negative Presumptive Negative    Fentanyl Screen, Urine Presumptive Negative Presumptive Negative    Opiate Screen, Urine Presumptive Negative Presumptive Negative    Oxycodone Screen, Urine Presumptive Negative Presumptive Negative    PCP Screen, Urine Presumptive Negative Presumptive Negative    Methadone Screen, Urine Presumptive Negative Presumptive Negative   Alcohol   Result Value Ref Range    Alcohol 47 (H) <=10 mg/dL   CBC   Result Value Ref Range    WBC 9.6 4.4 - 11.3 x10*3/uL    nRBC 0.0 0.0 - 0.0 /100 WBCs    RBC 3.61 (L) 4.50 - 5.90 x10*6/uL    Hemoglobin 11.6 (L) 13.5 - 17.5 g/dL    Hematocrit 34.4 (L) 41.0 - 52.0 %    MCV 95 80 - 100 fL    MCH 32.1 26.0 - 34.0 pg    MCHC 33.7 32.0 - 36.0 g/dL    RDW 13.1 11.5 - 14.5 %    Platelets 193 150 - 450 x10*3/uL   Basic metabolic panel   Result Value Ref Range    Glucose 94 74 - 99 mg/dL    Sodium 140 136 - 145 mmol/L    Potassium 3.7 3.5 - 5.3 mmol/L    Chloride 107 98 - 107 mmol/L    Bicarbonate 25 21 - 32 mmol/L    Anion Gap 12 10 - 20 mmol/L    Urea Nitrogen 14 6 - 23 mg/dL    Creatinine 0.77 0.50 - 1.30 mg/dL    eGFR >90 >60 mL/min/1.73m*2    Calcium 8.0 (L) 8.6 - 10.3 mg/dL   Lamotrigine level   Result Value Ref Range    Lamotrigine  <1.0 (L) 2.5 - 15.0 ug/mL        Imaging  MR brain wo IV contrast  Result Date: 5/2/2025  MRI BRAIN - 1. Artifactually limited study. No acute intracranial abnormality or intracranial mass.   MRA HEAD - 1. Artifactually limited study. Grossly patent [id number] structures.   MRA NECK - 1. Artifactually limited study. No evidence of occlusion in the bilateral carotid and vertebral arterial systems. Questionable moderate short-segment stenosis versus artifact proximal bilateral internal carotid artery cervical segments.   MACRO: None   Signed by: Zay Francois 5/2/2025 1:07 PM Dictation workstation:   [id number]    MR angio head wo IV  contrast  Result Date: 5/2/2025  MRI BRAIN - 1. Artifactually limited study. No acute intracranial abnormality or intracranial mass.   MRA HEAD - 1. Artifactually limited study. Grossly patent [id number] structures.   MRA NECK - 1. Artifactually limited study. No evidence of occlusion in the bilateral carotid and vertebral arterial systems. Questionable moderate short-segment stenosis versus artifact proximal bilateral internal carotid artery cervical segments.   MACRO: None   Signed by: Zay Francois 5/2/2025 1:07 PM Dictation workstation:   [id number]    MR angio neck wo IV contrast  Result Date: 5/2/2025  MRI BRAIN - 1. Artifactually limited study. No acute intracranial abnormality or intracranial mass.   MRA HEAD - 1. Artifactually limited study. Grossly patent [id number] structures.   MRA NECK - 1. Artifactually limited study. No evidence of occlusion in the bilateral carotid and vertebral arterial systems. Questionable moderate short-segment stenosis versus artifact proximal bilateral internal carotid artery cervical segments.   MACRO: None   Signed by: Zay Francois 5/2/2025 1:07 PM Dictation workstation:   [id number]    CT head wo IV contrast  Result Date: 5/1/2025  No evidence of acute cortical infarct or intracranial hemorrhage.   Senescent changes. No change. If persistent concern, consider MRI   MACRO: None   Signed by: Zay Bhakta 5/1/2025 11:36 PM Dictation workstation:   [id number]      Cardiology, Vascular, and Other Imaging  ECG 12 lead  Result Date: 5/2/2025  Sinus tachycardia Otherwise normal ECG When compared with ECG of 12-APR-2025 23:22, QRS axis Shifted left T wave inversion no longer evident in Lateral leads          Assessment/Plan   Assessment & Plan  Aphasia    Alcohol use disorder    Alcoholic intoxication without complication    Tachycardia    Tobacco use disorder    65-year-old man with a history of alcohol abuse presented with an episode concerning for TIA versus  seizure.  MRI and MR angiogram are both normal and reassuring.  His labs look fine from a neurologic perspective.  Given the duration of the event the aphasic episode may more likely have been a seizure than a TIA.  I believe he is okay for discharge to home.  He did not lose consciousness during this episode and is okay to resume driving per routine.  He should follow-up with me in 6 to 8 weeks or sooner if he has any further episodes in which case he would need an EEG.    I personally spent 60 minutes today, exclusive of procedures, providing care for this patient, including preparation, face to face time, documentation and other services such as review of medical records, diagnostic result, patient education, counseling, coordination of care as specified in the encounter.        [1]   Medications Prior to Admission   Medication Sig Dispense Refill Last Dose/Taking    ARIPiprazole (Abilify) 5 mg tablet Take 1 tablet (5 mg) by mouth once daily. 30 tablet 0 5/2/2025    desvenlafaxine (Pristiq) 100 mg 24 hr tablet Take 1 tablet (100 mg) by mouth once daily. Do not crush, chew, or split. 30 tablet 0 5/2/2025    mirtazapine (Remeron) 45 mg tablet Take 1 tablet (45 mg) by mouth once daily at bedtime.   5/1/2025    cloNIDine (Catapres) 0.2 mg tablet Take 0.5 tablets (0.1 mg) by mouth 2 times a day. 30 tablet 0     famotidine (Pepcid) 20 mg tablet Take 1 tablet (20 mg) by mouth 2 times a day for 7 days. 14 tablet 0     hydrOXYzine HCL (Atarax) 25 mg tablet Take 1 tablet (25 mg) by mouth every 4 hours if needed for anxiety.       lamoTRIgine (LaMICtal) 100 mg disintegrating tablet Take 1 tablet (100 mg) by mouth 2 times a day. 60 tablet 0     loratadine (Claritin) 10 mg tablet Take 1 tablet (10 mg) by mouth once daily for 7 days. 7 tablet 0    [2] ARIPiprazole, 5 mg, oral, Daily  cloNIDine, 0.1 mg, oral, BID  desvenlafaxine, 100 mg, oral, Daily  enoxaparin, 40 mg, subcutaneous, Daily  famotidine, 20 mg, oral, BID  folic  acid, 1 mg, oral, Daily  lamoTRIgine, 100 mg, oral, BID  mirtazapine, 45 mg, oral, Nightly  multivitamin with minerals, 1 tablet, oral, Daily  polyethylene glycol, 17 g, oral, Daily  [START ON 5/5/2025] thiamine, 100 mg, oral, Daily  thiamine, 100 mg, intravenous, Daily  [3]    [4] PRN medications: acetaminophen **OR** acetaminophen **OR** acetaminophen, hydrOXYzine HCL, LORazepam **OR** LORazepam **OR** LORazepam, melatonin, ondansetron ODT **OR** ondansetron

## 2025-05-03 VITALS
WEIGHT: 190.6 LBS | DIASTOLIC BLOOD PRESSURE: 90 MMHG | RESPIRATION RATE: 17 BRPM | HEART RATE: 81 BPM | TEMPERATURE: 97.5 F | HEIGHT: 72 IN | OXYGEN SATURATION: 100 % | SYSTOLIC BLOOD PRESSURE: 151 MMHG | BODY MASS INDEX: 25.81 KG/M2

## 2025-05-03 LAB
ANION GAP SERPL CALCULATED.3IONS-SCNC: 9 MMOL/L (ref 10–20)
BUN SERPL-MCNC: 11 MG/DL (ref 6–23)
CALCIUM SERPL-MCNC: 8.2 MG/DL (ref 8.6–10.3)
CHLORIDE SERPL-SCNC: 105 MMOL/L (ref 98–107)
CO2 SERPL-SCNC: 29 MMOL/L (ref 21–32)
CREAT SERPL-MCNC: 0.73 MG/DL (ref 0.5–1.3)
EGFRCR SERPLBLD CKD-EPI 2021: >90 ML/MIN/1.73M*2
ERYTHROCYTE [DISTWIDTH] IN BLOOD BY AUTOMATED COUNT: 12.8 % (ref 11.5–14.5)
GLUCOSE SERPL-MCNC: 90 MG/DL (ref 74–99)
HCT VFR BLD AUTO: 37.3 % (ref 41–52)
HGB BLD-MCNC: 12.7 G/DL (ref 13.5–17.5)
MCH RBC QN AUTO: 32.2 PG (ref 26–34)
MCHC RBC AUTO-ENTMCNC: 34 G/DL (ref 32–36)
MCV RBC AUTO: 94 FL (ref 80–100)
NRBC BLD-RTO: 0 /100 WBCS (ref 0–0)
PLATELET # BLD AUTO: 193 X10*3/UL (ref 150–450)
POTASSIUM SERPL-SCNC: 3.9 MMOL/L (ref 3.5–5.3)
RBC # BLD AUTO: 3.95 X10*6/UL (ref 4.5–5.9)
SODIUM SERPL-SCNC: 139 MMOL/L (ref 136–145)
WBC # BLD AUTO: 8.9 X10*3/UL (ref 4.4–11.3)

## 2025-05-03 PROCEDURE — 82374 ASSAY BLOOD CARBON DIOXIDE: CPT | Performed by: STUDENT IN AN ORGANIZED HEALTH CARE EDUCATION/TRAINING PROGRAM

## 2025-05-03 PROCEDURE — 36415 COLL VENOUS BLD VENIPUNCTURE: CPT | Performed by: STUDENT IN AN ORGANIZED HEALTH CARE EDUCATION/TRAINING PROGRAM

## 2025-05-03 PROCEDURE — 85027 COMPLETE CBC AUTOMATED: CPT | Performed by: STUDENT IN AN ORGANIZED HEALTH CARE EDUCATION/TRAINING PROGRAM

## 2025-05-03 ASSESSMENT — LIFESTYLE VARIABLES
TOTAL SCORE: 0
TREMOR: NO TREMOR
ORIENTATION AND CLOUDING OF SENSORIUM: ORIENTED AND CAN DO SERIAL ADDITIONS
VISUAL DISTURBANCES: NOT PRESENT
AUDITORY DISTURBANCES: NOT PRESENT
TOTAL SCORE: 0
ANXIETY: NO ANXIETY, AT EASE
HEADACHE, FULLNESS IN HEAD: NOT PRESENT
NAUSEA AND VOMITING: NO NAUSEA AND NO VOMITING
HEADACHE, FULLNESS IN HEAD: NOT PRESENT
ORIENTATION AND CLOUDING OF SENSORIUM: ORIENTED AND CAN DO SERIAL ADDITIONS
PAROXYSMAL SWEATS: NO SWEAT VISIBLE
AGITATION: NORMAL ACTIVITY
ANXIETY: NO ANXIETY, AT EASE
AGITATION: NORMAL ACTIVITY
PAROXYSMAL SWEATS: NO SWEAT VISIBLE
VISUAL DISTURBANCES: NOT PRESENT
TREMOR: NO TREMOR
VISUAL DISTURBANCES: NOT PRESENT
AUDITORY DISTURBANCES: NOT PRESENT
NAUSEA AND VOMITING: NO NAUSEA AND NO VOMITING
PAROXYSMAL SWEATS: NO SWEAT VISIBLE
TREMOR: NO TREMOR
NAUSEA AND VOMITING: NO NAUSEA AND NO VOMITING
AGITATION: NORMAL ACTIVITY
ORIENTATION AND CLOUDING OF SENSORIUM: ORIENTED AND CAN DO SERIAL ADDITIONS
AGITATION: NORMAL ACTIVITY
TOTAL SCORE: 0
HEADACHE, FULLNESS IN HEAD: NOT PRESENT
PAROXYSMAL SWEATS: NO SWEAT VISIBLE
AUDITORY DISTURBANCES: NOT PRESENT
TOTAL SCORE: 0
ORIENTATION AND CLOUDING OF SENSORIUM: ORIENTED AND CAN DO SERIAL ADDITIONS
TREMOR: NO TREMOR
HEADACHE, FULLNESS IN HEAD: NOT PRESENT
ANXIETY: NO ANXIETY, AT EASE
VISUAL DISTURBANCES: NOT PRESENT
AUDITORY DISTURBANCES: NOT PRESENT
NAUSEA AND VOMITING: NO NAUSEA AND NO VOMITING
ANXIETY: NO ANXIETY, AT EASE

## 2025-05-03 ASSESSMENT — PAIN SCALES - GENERAL
PAINLEVEL_OUTOF10: 0 - NO PAIN
PAINLEVEL_OUTOF10: 0 - NO PAIN

## 2025-05-03 NOTE — DISCHARGE SUMMARY
Discharge Diagnosis  Aphasia  Alcohol intoxication  Tobacco use      Issues Requiring Follow-Up  He left the hospital against medical advice    Discharge Meds     Medication List      ASK your doctor about these medications     ARIPiprazole 5 mg tablet; Commonly known as: Abilify; Take 1 tablet (5   mg) by mouth once daily.   cloNIDine 0.2 mg tablet; Commonly known as: Catapres; Take 0.5 tablets   (0.1 mg) by mouth 2 times a day.   desvenlafaxine 100 mg 24 hr tablet; Commonly known as: Pristiq; Take 1   tablet (100 mg) by mouth once daily. Do not crush, chew, or split.   famotidine 20 mg tablet; Commonly known as: Pepcid; Take 1 tablet (20   mg) by mouth 2 times a day for 7 days.   hydrOXYzine HCL 25 mg tablet; Commonly known as: Atarax   lamoTRIgine 100 mg disintegrating tablet; Commonly known as: LaMICtal;   Take 1 tablet (100 mg) by mouth 2 times a day.   loratadine 10 mg tablet; Commonly known as: Claritin; Take 1 tablet (10   mg) by mouth once daily for 7 days.   mirtazapine 45 mg tablet; Commonly known as: Remeron       Test Results Pending At Discharge  Pending Labs       Order Current Status    Extra Urine Gray Tube Collected (05/02/25 0006)    THC (Marijuana), Urine, Confirmation In process    Urinalysis with Reflex Culture and Microscopic In process            Hospital Course  85-year-old male patient with a history of alcohol and tobacco use admitted to the hospital emergency department after an episode of aphasia that lasted between 45 seconds and 1 minute.  A CT scan of the brain was done and was unremarkable.  He was admitted for further evaluation.  He had an MRI of the brain, MRA of the head and MRA of the neck which were unremarkable.  His symptoms did not recur.  He was seen by neurology and no further workup was recommended.  Today, he was found smoking in his room and his cigarettes were confiscated.  He said that he did not want to wait to be seen by the physician. He was probably stable for  discharge, however he did not wait and he signed out of the hospital AGAINST MEDICAL ADVICE before he could be seen today.       Outpatient Follow-Up  No future appointments.      John Rothman MD

## 2025-05-03 NOTE — NURSING NOTE
Patient caught smoking in room. Cigarettes confiscated. Patient stated he did not want to wait for doctors. Educated on leaving AMA. Patient signed paperwork. Doctor notified. IV removed per policy

## 2025-05-03 NOTE — CARE PLAN
The patient's goals for the shift include      The clinical goals for the shift include CIWA  LESS THAN 9 AND neuro check unchanged and stable    Over the shift, the patient did not make progress toward the following goals. Barriers to progression include he had slept well throughout the shift and ambulated in his room and to the bathroom without any falls. Recommendations to address these barriers include possible discharge to home today since he met all the above goals..

## 2025-05-04 LAB
ATRIAL RATE: 114 BPM
P AXIS: 62 DEGREES
P OFFSET: 187 MS
P ONSET: 131 MS
PR INTERVAL: 178 MS
Q ONSET: 220 MS
QRS COUNT: 18 BEATS
QRS DURATION: 90 MS
QT INTERVAL: 324 MS
QTC CALCULATION(BAZETT): 446 MS
QTC FREDERICIA: 401 MS
R AXIS: 59 DEGREES
T AXIS: 58 DEGREES
T OFFSET: 382 MS
VENTRICULAR RATE: 114 BPM

## 2025-05-05 LAB — CARBOXYTHC UR-MCNC: 214 NG/ML

## 2025-05-07 ENCOUNTER — APPOINTMENT (OUTPATIENT)
Dept: CARDIOLOGY | Facility: HOSPITAL | Age: 65
End: 2025-05-07
Payer: COMMERCIAL

## 2025-05-07 ENCOUNTER — APPOINTMENT (OUTPATIENT)
Dept: RADIOLOGY | Facility: HOSPITAL | Age: 65
End: 2025-05-07
Payer: COMMERCIAL

## 2025-05-07 ENCOUNTER — HOSPITAL ENCOUNTER (EMERGENCY)
Facility: HOSPITAL | Age: 65
Discharge: OTHER INSTITUTION | End: 2025-05-08
Attending: STUDENT IN AN ORGANIZED HEALTH CARE EDUCATION/TRAINING PROGRAM
Payer: COMMERCIAL

## 2025-05-07 DIAGNOSIS — F10.29 ALCOHOL DEPENDENCE WITH UNSPECIFIED ALCOHOL-INDUCED DISORDER: Primary | ICD-10-CM

## 2025-05-07 LAB
ALBUMIN SERPL BCP-MCNC: 3.9 G/DL (ref 3.4–5)
ALP SERPL-CCNC: 60 U/L (ref 33–136)
ALT SERPL W P-5'-P-CCNC: 33 U/L (ref 10–52)
AMPHETAMINES UR QL SCN: ABNORMAL
ANION GAP SERPL CALCULATED.3IONS-SCNC: 16 MMOL/L (ref 10–20)
APAP SERPL-MCNC: <10 UG/ML (ref ?–30)
APPEARANCE UR: CLEAR
AST SERPL W P-5'-P-CCNC: 27 U/L (ref 9–39)
BARBITURATES UR QL SCN: ABNORMAL
BASOPHILS # BLD AUTO: 0.06 X10*3/UL (ref 0–0.1)
BASOPHILS NFR BLD AUTO: 0.6 %
BENZODIAZ UR QL SCN: ABNORMAL
BILIRUB SERPL-MCNC: 0.4 MG/DL (ref 0–1.2)
BILIRUB UR STRIP.AUTO-MCNC: NEGATIVE MG/DL
BUN SERPL-MCNC: 19 MG/DL (ref 6–23)
BZE UR QL SCN: ABNORMAL
CALCIUM SERPL-MCNC: 7.8 MG/DL (ref 8.6–10.3)
CANNABINOIDS UR QL SCN: ABNORMAL
CHLORIDE SERPL-SCNC: 102 MMOL/L (ref 98–107)
CO2 SERPL-SCNC: 23 MMOL/L (ref 21–32)
COLOR UR: NORMAL
CREAT SERPL-MCNC: 0.85 MG/DL (ref 0.5–1.3)
EGFRCR SERPLBLD CKD-EPI 2021: >90 ML/MIN/1.73M*2
EOSINOPHIL # BLD AUTO: 0.14 X10*3/UL (ref 0–0.7)
EOSINOPHIL NFR BLD AUTO: 1.4 %
ERYTHROCYTE [DISTWIDTH] IN BLOOD BY AUTOMATED COUNT: 12.9 % (ref 11.5–14.5)
ETHANOL SERPL-MCNC: 193 MG/DL
FENTANYL+NORFENTANYL UR QL SCN: ABNORMAL
GLUCOSE SERPL-MCNC: 131 MG/DL (ref 74–99)
GLUCOSE UR STRIP.AUTO-MCNC: NORMAL MG/DL
HCT VFR BLD AUTO: 40.9 % (ref 41–52)
HGB BLD-MCNC: 14.7 G/DL (ref 13.5–17.5)
IMM GRANULOCYTES # BLD AUTO: 0.05 X10*3/UL (ref 0–0.7)
IMM GRANULOCYTES NFR BLD AUTO: 0.5 % (ref 0–0.9)
KETONES UR STRIP.AUTO-MCNC: NEGATIVE MG/DL
LEUKOCYTE ESTERASE UR QL STRIP.AUTO: NEGATIVE
LYMPHOCYTES # BLD AUTO: 3.37 X10*3/UL (ref 1.2–4.8)
LYMPHOCYTES NFR BLD AUTO: 34 %
MCH RBC QN AUTO: 32.1 PG (ref 26–34)
MCHC RBC AUTO-ENTMCNC: 35.9 G/DL (ref 32–36)
MCV RBC AUTO: 89 FL (ref 80–100)
METHADONE UR QL SCN: ABNORMAL
MONOCYTES # BLD AUTO: 0.75 X10*3/UL (ref 0.1–1)
MONOCYTES NFR BLD AUTO: 7.6 %
NEUTROPHILS # BLD AUTO: 5.55 X10*3/UL (ref 1.2–7.7)
NEUTROPHILS NFR BLD AUTO: 55.9 %
NITRITE UR QL STRIP.AUTO: NEGATIVE
NRBC BLD-RTO: 0 /100 WBCS (ref 0–0)
OPIATES UR QL SCN: ABNORMAL
OXYCODONE+OXYMORPHONE UR QL SCN: ABNORMAL
PCP UR QL SCN: ABNORMAL
PH UR STRIP.AUTO: 5.5 [PH]
PLATELET # BLD AUTO: 258 X10*3/UL (ref 150–450)
POTASSIUM SERPL-SCNC: 3.5 MMOL/L (ref 3.5–5.3)
PROT SERPL-MCNC: 6.4 G/DL (ref 6.4–8.2)
PROT UR STRIP.AUTO-MCNC: NEGATIVE MG/DL
RBC # BLD AUTO: 4.58 X10*6/UL (ref 4.5–5.9)
RBC # UR STRIP.AUTO: NEGATIVE MG/DL
SALICYLATES SERPL-MCNC: <3 MG/DL (ref ?–20)
SODIUM SERPL-SCNC: 137 MMOL/L (ref 136–145)
SP GR UR STRIP.AUTO: 1.02
UROBILINOGEN UR STRIP.AUTO-MCNC: NORMAL MG/DL
WBC # BLD AUTO: 9.9 X10*3/UL (ref 4.4–11.3)

## 2025-05-07 PROCEDURE — 80307 DRUG TEST PRSMV CHEM ANLYZR: CPT | Performed by: PHYSICIAN ASSISTANT

## 2025-05-07 PROCEDURE — 80053 COMPREHEN METABOLIC PANEL: CPT | Performed by: PHYSICIAN ASSISTANT

## 2025-05-07 PROCEDURE — 99285 EMERGENCY DEPT VISIT HI MDM: CPT | Mod: 25 | Performed by: STUDENT IN AN ORGANIZED HEALTH CARE EDUCATION/TRAINING PROGRAM

## 2025-05-07 PROCEDURE — 70450 CT HEAD/BRAIN W/O DYE: CPT

## 2025-05-07 PROCEDURE — 96374 THER/PROPH/DIAG INJ IV PUSH: CPT

## 2025-05-07 PROCEDURE — 85025 COMPLETE CBC W/AUTO DIFF WBC: CPT | Performed by: PHYSICIAN ASSISTANT

## 2025-05-07 PROCEDURE — 93005 ELECTROCARDIOGRAM TRACING: CPT

## 2025-05-07 PROCEDURE — 81003 URINALYSIS AUTO W/O SCOPE: CPT | Performed by: PHYSICIAN ASSISTANT

## 2025-05-07 PROCEDURE — 2500000004 HC RX 250 GENERAL PHARMACY W/ HCPCS (ALT 636 FOR OP/ED): Mod: JZ | Performed by: PHYSICIAN ASSISTANT

## 2025-05-07 PROCEDURE — 36415 COLL VENOUS BLD VENIPUNCTURE: CPT | Performed by: PHYSICIAN ASSISTANT

## 2025-05-07 PROCEDURE — 96361 HYDRATE IV INFUSION ADD-ON: CPT

## 2025-05-07 PROCEDURE — 2500000001 HC RX 250 WO HCPCS SELF ADMINISTERED DRUGS (ALT 637 FOR MEDICARE OP): Performed by: PHYSICIAN ASSISTANT

## 2025-05-07 PROCEDURE — 80320 DRUG SCREEN QUANTALCOHOLS: CPT | Performed by: PHYSICIAN ASSISTANT

## 2025-05-07 PROCEDURE — 70450 CT HEAD/BRAIN W/O DYE: CPT | Performed by: RADIOLOGY

## 2025-05-07 RX ORDER — MULTIVIT-MIN/IRON FUM/FOLIC AC 7.5 MG-4
1 TABLET ORAL DAILY
Status: DISCONTINUED | OUTPATIENT
Start: 2025-05-07 | End: 2025-05-08 | Stop reason: HOSPADM

## 2025-05-07 RX ORDER — LANOLIN ALCOHOL/MO/W.PET/CERES
100 CREAM (GRAM) TOPICAL DAILY
Status: DISCONTINUED | OUTPATIENT
Start: 2025-05-07 | End: 2025-05-08 | Stop reason: HOSPADM

## 2025-05-07 RX ORDER — LORAZEPAM 2 MG/ML
0.5 INJECTION INTRAMUSCULAR EVERY 2 HOUR PRN
Status: DISCONTINUED | OUTPATIENT
Start: 2025-05-07 | End: 2025-05-08 | Stop reason: HOSPADM

## 2025-05-07 RX ORDER — LORAZEPAM 2 MG/ML
2 INJECTION INTRAMUSCULAR EVERY 2 HOUR PRN
Status: DISCONTINUED | OUTPATIENT
Start: 2025-05-07 | End: 2025-05-08 | Stop reason: HOSPADM

## 2025-05-07 RX ORDER — LORAZEPAM 2 MG/ML
1 INJECTION INTRAMUSCULAR EVERY 2 HOUR PRN
Status: DISCONTINUED | OUTPATIENT
Start: 2025-05-07 | End: 2025-05-08 | Stop reason: HOSPADM

## 2025-05-07 RX ORDER — FOLIC ACID 1 MG/1
1 TABLET ORAL DAILY
Status: DISCONTINUED | OUTPATIENT
Start: 2025-05-07 | End: 2025-05-08 | Stop reason: HOSPADM

## 2025-05-07 RX ORDER — ONDANSETRON HYDROCHLORIDE 2 MG/ML
4 INJECTION, SOLUTION INTRAVENOUS ONCE
Status: COMPLETED | OUTPATIENT
Start: 2025-05-07 | End: 2025-05-07

## 2025-05-07 RX ADMIN — Medication 100 MG: at 19:00

## 2025-05-07 RX ADMIN — ONDANSETRON 4 MG: 2 INJECTION, SOLUTION INTRAMUSCULAR; INTRAVENOUS at 19:30

## 2025-05-07 RX ADMIN — SODIUM CHLORIDE 1000 ML: 900 INJECTION, SOLUTION INTRAVENOUS at 18:37

## 2025-05-07 RX ADMIN — FOLIC ACID 1 MG: 1 TABLET ORAL at 19:00

## 2025-05-07 RX ADMIN — Medication 1 TABLET: at 19:00

## 2025-05-07 ASSESSMENT — LIFESTYLE VARIABLES
HAVE YOU EVER FELT YOU SHOULD CUT DOWN ON YOUR DRINKING: YES
HAVE PEOPLE ANNOYED YOU BY CRITICIZING YOUR DRINKING: YES
EVER HAD A DRINK FIRST THING IN THE MORNING TO STEADY YOUR NERVES TO GET RID OF A HANGOVER: YES
TOTAL SCORE: 4
EVER FELT BAD OR GUILTY ABOUT YOUR DRINKING: YES

## 2025-05-07 ASSESSMENT — PAIN SCALES - GENERAL
PAINLEVEL_OUTOF10: 0 - NO PAIN
PAINLEVEL_OUTOF10: 0 - NO PAIN

## 2025-05-07 ASSESSMENT — PAIN DESCRIPTION - PAIN TYPE: TYPE: ACUTE PAIN

## 2025-05-07 ASSESSMENT — PAIN - FUNCTIONAL ASSESSMENT: PAIN_FUNCTIONAL_ASSESSMENT: 0-10

## 2025-05-07 NOTE — ED PROVIDER NOTES
HPI   Chief Complaint   Patient presents with    Dizziness     Patient with complaints of dizziness, patient has drank today, pt does express want for detox       HPI  This is a 65-year-old male presenting to the emergency department for evaluation of alcohol abuse.  Patient has a history of alcohol abuse.  He states he has been to many different inpatient detox programs.  He is interested in going to an inpatient detox program today.  He was found in a parking lot outside [employer] and brought in by PD.  He states he has been drinking today, cannot quantify how much she has been drinking.  He also complains of dizziness, no falls since the motion however.  Was recently admitted for aphasia, however patient states that has resolved.  No headaches, changes to his vision, focal weakness or numbness in his arms or legs, difficulty speaking or finding the words he wants to say.  No suicidal or homicidal ideations.  Patient admits to marijuana use but no other illicit drug use.    Please see HPI for pertinent positive and negative ROS.      Patient History   Medical History[1]  Surgical History[2]  Family History[3]  Social History[4]    Physical Exam   ED Triage Vitals   Temp Pulse Resp BP   -- -- -- --      SpO2 Temp src Heart Rate Source Patient Position   -- -- -- --      BP Location FiO2 (%)     -- --       Physical Exam  GENERAL APPEARANCE: Awake and alert. No acute respiratory distress.   VITAL SIGNS: As per the nurses' triage record.  HEENT: Normocephalic, atraumatic. Extraocular muscles are intact.  Negative test of skew bilaterally.  No vertical nystagmus bilaterally.  NECK: Soft, nontender and supple, full gross ROM, no meningeal signs.  CHEST: Nontender to palpation. Clear to auscultation bilaterally. No rales, rhonchi, or wheezing. Symmetric rise and fall of chest wall.   HEART: Clear S1 and S2. Regular rate and rhythm.  Strong and equal pulses in the extremities.  ABDOMEN: Soft, nontender,  nondistended  MUSCULOSKELETAL: Full gross active range of motion. Ambulating on own with no acute difficulties  NEUROLOGICAL: Awake, alert and oriented x 3. Motor power intact in the upper and lower extremities. Sensation is intact to light touch in the upper and lower extremities. Patient answering questions appropriately.   IMMUNOLOGICAL: No lymphatic streaking noted  DERMATOLOGIC: Warm and dry without petechiae, rashes, or ecchymosis noted on visible skin.   PYSCH: Cooperative, patient is acutely intoxicated.    ED Course & MDM   ED Course as of 05/08/25 2144   Wed May 07, 2025   1845 EKG interpreted by myself independently, EKG shows normal sinus rhythm, rate of 97 bpm, OK interval 176, QRS 92, , QTc 449, patient has no ST elevation or depression, negative for acute MI. [ROLANDO]      ED Course User Index  [ROLANDO] Evan Hernandes,          Diagnoses as of 05/08/25 2144   Alcohol dependence with unspecified alcohol-induced disorder                 No data recorded     [place] Coma Scale Score: 15 (05/07/25 1752 : Lashawn Raygoza RN)                           Medical Decision Making  Parts of this chart have been completed using voice recognition software. Please excuse any errors of transcription.  My thought process and reason for plan has been formulated from the time that I saw the patient until the time of disposition and is not specific to one specific moment during their visit and furthermore my MDM encompasses this entire chart and not only this text box.      HPI: Detailed above.    Exam: A medically appropriate exam performed, outlined above, given the known history and presentation.    History obtained from: Patient    EKG: See my supervising physicians EKG interpretation    Medications given during visit:  Medications   sodium chloride 0.9 % bolus 1,000 mL (0 mL intravenous Stopped 5/7/25 1915)   ondansetron (Zofran) injection 4 mg (4 mg intravenous Given 5/7/25 1930)        Diagnostic/tests  Labs  Reviewed   CBC WITH AUTO DIFFERENTIAL - Abnormal       Result Value    WBC 9.9      nRBC 0.0      RBC 4.58      Hemoglobin 14.7      Hematocrit 40.9 (*)     MCV 89      MCH 32.1      MCHC 35.9      RDW 12.9      Platelets 258      Neutrophils % 55.9      Immature Granulocytes %, Automated 0.5      Lymphocytes % 34.0      Monocytes % 7.6      Eosinophils % 1.4      Basophils % 0.6      Neutrophils Absolute 5.55      Immature Granulocytes Absolute, Automated 0.05      Lymphocytes Absolute 3.37      Monocytes Absolute 0.75      Eosinophils Absolute 0.14      Basophils Absolute 0.06     COMPREHENSIVE METABOLIC PANEL - Abnormal    Glucose 131 (*)     Sodium 137      Potassium 3.5      Chloride 102      Bicarbonate 23      Anion Gap 16      Urea Nitrogen 19      Creatinine 0.85      eGFR >90      Calcium 7.8 (*)     Albumin 3.9      Alkaline Phosphatase 60      Total Protein 6.4      AST 27      Bilirubin, Total 0.4      ALT 33     ACUTE TOXICOLOGY PANEL, BLOOD - Abnormal    Acetaminophen <10.0      Salicylate  <3      Alcohol 193 (*)    DRUG SCREEN,URINE - Abnormal    Amphetamine Screen, Urine Presumptive Negative      Barbiturate Screen, Urine Presumptive Negative      Benzodiazepines Screen, Urine Presumptive Negative      Cannabinoid Screen, Urine Presumptive Positive (*)     Cocaine Metabolite Screen, Urine Presumptive Negative      Fentanyl Screen, Urine Presumptive Negative      Opiate Screen, Urine Presumptive Negative      Oxycodone Screen, Urine Presumptive Negative      PCP Screen, Urine Presumptive Negative      Methadone Screen, Urine Presumptive Negative      Narrative:     Drug screen results are presumptive and should not be used to assess   compliance with prescribed medication. Contact the performing [facility] laboratory   to add-on definitive confirmatory testing if clinically indicated.    Toxicology screening results are reported qualitatively. The concentration must   be greater than or equal to the  cutoff to be reported as positive. The concentration   at which the screening test can detect an individual drug or metabolite varies.   The absence of expected drug(s) and/or drug metabolite(s) may indicate non-compliance,   inappropriate timing of specimen collection relative to drug administration, poor drug   absorption, diluted/adulterated urine, or limitations of testing. For medical purposes   only; not valid for forensic use.    Interpretive questions should be directed to the laboratory medical directors.   URINALYSIS WITH REFLEX CULTURE AND MICROSCOPIC - Normal    Color, Urine Light-Yellow      Appearance, Urine Clear      Specific Gravity, Urine 1.021      pH, Urine 5.5      Protein, Urine NEGATIVE      Glucose, Urine Normal      Blood, Urine NEGATIVE      Ketones, Urine NEGATIVE      Bilirubin, Urine NEGATIVE      Urobilinogen, Urine Normal      Nitrite, Urine NEGATIVE      Leukocyte Esterase, Urine NEGATIVE     URINALYSIS WITH REFLEX CULTURE AND MICROSCOPIC    Narrative:     The following orders were created for panel order Urinalysis with Reflex Culture and Microscopic.  Procedure                               Abnormality         Status                     ---------                               -----------         ------                     Urinalysis with Reflex C...[[id number]]  Normal              Final result               Extra Urine Gray Tube[[id number]]                            Final result                 Please view results for these tests on the individual orders.   EXTRA URINE GRAY TUBE    Extra Tube Hold for add-ons.        CT head wo IV contrast   Final Result   No evidence of acute cortical infarct or intracranial hemorrhage.                  MACRO:   None             Signed by: Janusz Dailey 5/7/2025 6:19 PM   Dictation workstation:   [id number]           Considerations/further MDM:  Patient was seen in conjucntion with my supervising physician,  Dr. Salvador. Please refer to his  note.    Patient presents for alcohol abuse.  Is interested in inpatient detox.  Due to patient complaining of dizziness I did proceed with CT head without IV contrast.  Patient's NIH stroke scale score 0.  He is not ataxic with walking.  Negative test of skew bilaterally.  Therefore I have very low suspicion for CVA.    CT head without IV contrast shows no evidence of acute cortical infarct or intracranial hemorrhage.  Laboratory evaluation does reveal an alcohol level of 193.  Drug screen positive for cannabinoid.  CBC, CMP urinalysis unremarkable.  CIWA protocol was ordered.  Patient was given 1 L IV normal saline, IV Zofran as he was complaining of some nausea, folic acid, multivitamin and thiamine.     consultation was placed as patient does want to go to inpatient detox for alcohol abuse.    At the end of the shift, the patient remains in the emergency department pending  consultation/evaluation.  Please refer to my oncoming supervising physician's note for further management and disposition of this patient while he remains in the emergency department.  End my shift 5/8/2025 at 0100.      Procedure  Procedures       [1]   Past Medical History:  Diagnosis Date    Alcohol use     Depression     Hypertension    [2] No past surgical history on file.  [3]   Family History  Problem Relation Name Age of Onset    Hypertension Other      Heart disease Other     [4]   Social History  Tobacco Use    Smoking status: Every Day     Types: Cigarettes    Smokeless tobacco: Not on file   Substance Use Topics    Alcohol use: Yes     Comment: 3 pints of vodka a day    Drug use: Yes     Types: Marijuana        Vane Davies PA-C  05/08/25 5880

## 2025-05-08 VITALS
BODY MASS INDEX: 25.73 KG/M2 | TEMPERATURE: 98.2 F | SYSTOLIC BLOOD PRESSURE: 154 MMHG | DIASTOLIC BLOOD PRESSURE: 90 MMHG | RESPIRATION RATE: 16 BRPM | HEIGHT: 72 IN | OXYGEN SATURATION: 95 % | WEIGHT: 190 LBS | HEART RATE: 98 BPM

## 2025-05-08 LAB — HOLD SPECIMEN: NORMAL

## 2025-05-08 PROCEDURE — 90839 PSYTX CRISIS INITIAL 60 MIN: CPT

## 2025-05-08 PROCEDURE — 2500000001 HC RX 250 WO HCPCS SELF ADMINISTERED DRUGS (ALT 637 FOR MEDICARE OP): Performed by: PHYSICIAN ASSISTANT

## 2025-05-08 RX ADMIN — FOLIC ACID 1 MG: 1 TABLET ORAL at 08:56

## 2025-05-08 RX ADMIN — Medication 100 MG: at 08:56

## 2025-05-08 RX ADMIN — Medication 1 TABLET: at 08:56

## 2025-05-08 SDOH — HEALTH STABILITY: MENTAL HEALTH: DEPRESSION SYMPTOMS: NO PROBLEMS REPORTED OR OBSERVED.

## 2025-05-08 SDOH — HEALTH STABILITY: MENTAL HEALTH: ANXIETY SYMPTOMS: NO PROBLEMS REPORTED OR OBSERVED.

## 2025-05-08 ASSESSMENT — LIFESTYLE VARIABLES
SUBSTANCE_ABUSE_PAST_12_MONTHS: NO
AGITATION: NORMAL ACTIVITY
TOTAL SCORE: 3
PULSE: 98
TREMOR: NOT VISIBLE, BUT CAN BE FELT FINGERTIP TO FINGERTIP
HEADACHE, FULLNESS IN HEAD: NOT PRESENT
PAROXYSMAL SWEATS: NO SWEAT VISIBLE
VISUAL DISTURBANCES: NOT PRESENT
NAUSEA AND VOMITING: 2
ORIENTATION AND CLOUDING OF SENSORIUM: ORIENTED AND CAN DO SERIAL ADDITIONS
PRESCIPTION_ABUSE_PAST_12_MONTHS: NO
AUDITORY DISTURBANCES: NOT PRESENT
ANXIETY: NO ANXIETY, AT EASE

## 2025-05-08 NOTE — ED PROVIDER NOTES
The patient was seen in conjunction with the advanced practice provider, and I performed a substantive portion of the encounter. The following is my supervisory note.    History:  Patient presents to ED by PD for concern of alcohol intoxication requesting EtOH detox.  States he drinks moderately and daily 4-5 beers per day.  Notes he is involved with detox worsens past and relapses frequently.  Does endorse dizziness but cannot recall any falls or head strikes.  Present denies any headache, vision changes, neck pain/stiffness.  Denies any neurodeficits extremities.  Nexrutine cardiopulmonary symptoms.  Denies any GI/ symptoms.  Denies any SI/HI.  Endorses marijuana use but denies any other significant substance use.  Denies gastric and stomach symptoms or complaints.    VS:  /90   Pulse 98   Temp 36.8 °C (98.2 °F) (Temporal)   Resp 16   Ht 1.829 m (6')   Wt 86.2 kg (190 lb)   SpO2 95%   BMI 25.77 kg/m²      Physical exam:  CONST: alert, normal appearance, no acute distress, does not appear ill/toxic  HEAD: normocephalic, atraumatic  EYES: PEPRL 4-2 mm, EOMI, no scleral icterus, no nystagmus, mild bilateral conjunctival injection  CV: Tachycardia with regular rhythm, no murmurs  PULM: CTAB, no respiratory distress  MSK: no obvious wounds  SKIN: warm/dry, no pallor or jaundice, no rash  NEURO: NIHSS 0, A&Ox4, no facial asymmetry, no focal neuro deficits, dysarthria/slurred speech, gross strength/motor/sensation intact x4, normal gait  PSYCH: Appears intoxicated, no appreciable internal stimuli, no obvious self injures behavior, calm and cooperative, mildly slurred speech, denies any SI/HI not demonstrating paranoid delusional thought processes      I personally reviewed and interpreted the following studies: EKG is NSR 97, normal axis/intervals, no appreciable ischemia, labs are significant for EtOH mildly elevated and cannabinoid metabolites present, otherwise unremarkable/contributory, images are  notable for CTH no evidence of traumatic ICH or calvarium fracture, no evidence of intracranial/intracerebral mass effect.        MDM:  Patient presented to the ED for evaluation for intoxication and requesting EtOH detox.  Concerning PMHx of PSUD, HTN.    Per Chart Review: ED encounter and hospitalization 5/1/2025 for aphasia and EtOH intoxication, discharge summary significant for aphasia lasting for about 60 seconds, imaging obtained unremarkable for any acute intracranial processes, admitted for further evaluation and plan for MRI, found to be intoxicated, neurology evaluated no concern for ischemic processes, patient signed out AMA without repeat evaluation by physician, LOS 2 days.    Assessment/evaluation consistent with chronic moderate EtOH use/dependency consistent with well-established alcoholism requesting EtOH detox. No concerning history, clinical evidence/work-up, or exam findings for the concerning differentials of traumatic/infectious/metabolic/electrolyte encephalopathy. These conditions have been thoroughly evaluated and determined to be sufficiently unlikely to be the etiology of patient's presenting symptoms.     Scores: NIHSS 0    ED Course/Diagnosis:  ED Course as of 05/08/25 1745   Wed May 07, 2025   1845 EKG interpreted by myself independently, EKG shows normal sinus rhythm, rate of 97 bpm, MO interval 176, QRS 92, , QTc 449, patient has no ST elevation or depression, negative for acute MI. [ROLANDO]      ED Course User Index  [ROLANDO] Evan Hernandes DO         Diagnoses as of 05/08/25 1745   Alcohol dependence with unspecified alcohol-induced disorder       1. Alcohol dependence with unspecified alcohol-induced disorder                 Harsh Salvador MD  05/08/25 1745

## 2025-05-08 NOTE — PROGRESS NOTES
Patient was received in signout at start of shift.    IN BRIEF    65 year old male presents with alcohol abuse.  Patient would like to go into detox.    At the end of shift patient pending acceptance.       ED COURSE   Patient was expected for alcohol detox to [facility] by Dr. Main.  Patient transferred in stable condition.    FINAL IMPRESSION      1. Alcohol dependence with unspecified alcohol-induced disorder          DISPOSITION    Transfer To Another Facility 05/08/2025 08:19:40 AM

## 2025-05-08 NOTE — PROGRESS NOTES
Social Work Note  5/7/2025  10:14 SW attempted to complete psych evaluation. Patient sleeping/snoring and would not wake up. Plan: SW will attempt again later tonight to complete assessment.

## 2025-05-08 NOTE — PROGRESS NOTES
EPAT - Social Work Psychiatric Assessment    Arrival Details  Mode of Arrival: Ambulance  Admission Source: Home  Admission Type: Voluntary  EPAT Assessment Start Date: 05/08/25  EPAT Assessment Start Time: 0530  Name of : EL Frederick    History of Present Illness  Admission Reason: Detox placement  HPI: Pt is a 64 y/o male presenting to [facility] ED for alcohol intoxication and seeking detox.     Pt is linked with [facility] for medication management. 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 The triage risk assessment was reviewed and pt was indicated to be no risk. EPAT consulted for evaluation.    SW Readmission Information   Readmission within 30 Days: No    Psychiatric Symptoms  Anxiety Symptoms: No problems reported or observed.  Depression Symptoms: No problems reported or observed.  [place] Symptoms: No problems reported or observed.    Psychosis Symptoms  Hallucination Type: No problems reported or observed.  Delusion Type: No problems reported or observed.    Additional Symptoms - Adult  Generalized Anxiety Disorder: No problems reported or observed.  Obsessive Compulsive Disorder: No problems reported or observed.  Panic Attack: No problems reported or observed.  Post Traumatic Stress Disorder: No problems reported or observed.  Delirium: No problems reported or observed.    Past Psychiatric History/Meds/Treatments  Past Psychiatric History: Dx of  Alcohol Abuse and Dependence  Past Psychiatric Meds/Treatments: Unknown    Current Mental Health Contacts  Provider Name/Phone Number: [facility]         Living Arrangement: Homeless              [employer] Service/Education History  Current or Previous  Service: None         Legal  Legal Considerations: Patient/ Family Ability to Make Healthcare Decisions    Drug Screening  Have you used any substances (canabis, cocaine, heroin,  hallucinogens, inhalants, etc.) in the past 12 months?: No  Have you used any prescription drugs other than prescribed in the past 12 months?: No  Is a toxicology screen needed?: Yes    Stage of Change  Stage of Change: Precontemplation  History of Treatment: Inpatient, AA/NA meetings  Type of Treatment Offered: Inpatient, AA/NA meeting resource  Treatment Offered: Accepted  Duration of Substance Use: 45 plus years  Frequency of Substance Use: Daily  Age of First Substance Use: 20         Orientation  Orientation Level: Unable to assess    General Appearance  Motor Activity: Unremarkable  Speech Pattern: Slow  General Attitude: Cooperative  Appearance/Hygiene: Disheveled    Thought Process  Coherency: Unable to assess  Content: Unremarkable  Delusions: Controlled  Perception: Not altered  Hallucination: None  Confusion: None  Cognition: No long term memory loss, No short term memory loss    Sleep Pattern  Sleep Pattern: Unable to assess    Risk Factors  Self Harm/Suicidal Ideation Plan: Pt denies  Previous Self Harm/Suicidal Plans: No  Description of Thoughts/Ideas Leaving Unit Now: Pt seeking detox treatment    Violence Risk Assessment  Assessment of Violence: None noted  Thoughts of Harm to Others: No    Ability to Assess Risk Screen  Risk Screen - Ability to Assess: Able to be screened  Step 1: Risk Factors  Current & Past Psychiatric Dx: Alcohol/substance abuse disorders    Psychiatric Impression and Plan of Care  Assessment and Plan: Upon assessment. Pt presents stating “ I don’t feel good.”  SW inquired about pt’s motivation for change. Pt is seeking help with stopping alcohol use. Pt reports that he has been living outside and not able to stay with his sister due to his ETOH use. Pt  reporting  drinking several pints of vodka a day. SW challenged this with pt due to his BAL level upon arrival. BAL on arrival 193. P endorses isolating himself when he is drinking Pt has been consuming ETOH  daily since the age  of 19 y/o and the longest sobriety was for 2 months when he was in group home. Pt reports completing detox in March 2024. He reports being hospitalized for 8 days. Pt reports withdrawal sx of shaking, headache, sweating and nausea when he does not consume ETOH and during detox. Pt is aware of what is needed to remain sober but is not able to maintain sobriety once outside of the hospital setting.  Specific Resources Provided to Patient: Peer support spoke iwth pt at time of arrival  Plan Comments: Detox admission    Outcome/Disposition  Patient's Perception of Outcome Achieved: Pt seeking  Assessment, Recommendations and Risk Level Reviewed with: Dr. Salvador  Contact Name: Melinda Vu  Contact Number(s): [phone]  Contact Relationship: Sister  EPAT Assessment Completed Date: 05/08/25  EPAT Assessment Completed Time: 0545  Patient Disposition: Out of network facility (Specify)  Out of Network Reason: Patient requests another facility

## 2025-05-10 LAB
ATRIAL RATE: 97 BPM
P AXIS: 67 DEGREES
P OFFSET: 188 MS
P ONSET: 137 MS
PR INTERVAL: 176 MS
Q ONSET: 225 MS
QRS COUNT: 16 BEATS
QRS DURATION: 92 MS
QT INTERVAL: 354 MS
QTC CALCULATION(BAZETT): 449 MS
QTC FREDERICIA: 415 MS
R AXIS: 64 DEGREES
T AXIS: 57 DEGREES
T OFFSET: 402 MS
VENTRICULAR RATE: 97 BPM

## 2025-06-05 ENCOUNTER — APPOINTMENT (OUTPATIENT)
Dept: CARDIOLOGY | Facility: HOSPITAL | Age: 65
End: 2025-06-05
Payer: COMMERCIAL

## 2025-06-05 ENCOUNTER — HOSPITAL ENCOUNTER (EMERGENCY)
Facility: HOSPITAL | Age: 65
Discharge: NURSING, REHAB OR LONG-TERM CARE | End: 2025-06-05
Attending: STUDENT IN AN ORGANIZED HEALTH CARE EDUCATION/TRAINING PROGRAM
Payer: COMMERCIAL

## 2025-06-05 VITALS
BODY MASS INDEX: 25.73 KG/M2 | OXYGEN SATURATION: 96 % | TEMPERATURE: 97.5 F | SYSTOLIC BLOOD PRESSURE: 145 MMHG | DIASTOLIC BLOOD PRESSURE: 98 MMHG | WEIGHT: 190 LBS | HEART RATE: 92 BPM | RESPIRATION RATE: 17 BRPM | HEIGHT: 72 IN

## 2025-06-05 DIAGNOSIS — F10.10 ALCOHOL ABUSE: Primary | ICD-10-CM

## 2025-06-05 DIAGNOSIS — F10.920 ALCOHOLIC INTOXICATION WITHOUT COMPLICATION: ICD-10-CM

## 2025-06-05 LAB
ALBUMIN SERPL BCP-MCNC: 4.3 G/DL (ref 3.4–5)
ALP SERPL-CCNC: 51 U/L (ref 33–136)
ALT SERPL W P-5'-P-CCNC: 17 U/L (ref 10–52)
AMPHETAMINES UR QL SCN: ABNORMAL
ANION GAP SERPL CALCULATED.3IONS-SCNC: 14 MMOL/L (ref 10–20)
APPEARANCE UR: CLEAR
AST SERPL W P-5'-P-CCNC: 26 U/L (ref 9–39)
BARBITURATES UR QL SCN: ABNORMAL
BASOPHILS # BLD AUTO: 0.09 X10*3/UL (ref 0–0.1)
BASOPHILS NFR BLD AUTO: 0.8 %
BENZODIAZ UR QL SCN: ABNORMAL
BILIRUB SERPL-MCNC: 0.7 MG/DL (ref 0–1.2)
BILIRUB UR STRIP.AUTO-MCNC: NEGATIVE MG/DL
BUN SERPL-MCNC: 15 MG/DL (ref 6–23)
BZE UR QL SCN: ABNORMAL
CALCIUM SERPL-MCNC: 8.5 MG/DL (ref 8.6–10.3)
CANNABINOIDS UR QL SCN: ABNORMAL
CHLORIDE SERPL-SCNC: 104 MMOL/L (ref 98–107)
CO2 SERPL-SCNC: 24 MMOL/L (ref 21–32)
COLOR UR: ABNORMAL
CREAT SERPL-MCNC: 0.72 MG/DL (ref 0.5–1.3)
EGFRCR SERPLBLD CKD-EPI 2021: >90 ML/MIN/1.73M*2
EOSINOPHIL # BLD AUTO: 0.08 X10*3/UL (ref 0–0.7)
EOSINOPHIL NFR BLD AUTO: 0.7 %
ERYTHROCYTE [DISTWIDTH] IN BLOOD BY AUTOMATED COUNT: 12.4 % (ref 11.5–14.5)
ETHANOL SERPL-MCNC: 183 MG/DL
FENTANYL+NORFENTANYL UR QL SCN: ABNORMAL
GLUCOSE SERPL-MCNC: 90 MG/DL (ref 74–99)
GLUCOSE UR STRIP.AUTO-MCNC: NORMAL MG/DL
HCT VFR BLD AUTO: 42.5 % (ref 41–52)
HGB BLD-MCNC: 14.9 G/DL (ref 13.5–17.5)
IMM GRANULOCYTES # BLD AUTO: 0.06 X10*3/UL (ref 0–0.7)
IMM GRANULOCYTES NFR BLD AUTO: 0.5 % (ref 0–0.9)
INR PPP: 1.1 (ref 0.9–1.2)
KETONES UR STRIP.AUTO-MCNC: NEGATIVE MG/DL
LEUKOCYTE ESTERASE UR QL STRIP.AUTO: NEGATIVE
LIPASE SERPL-CCNC: 75 U/L (ref 9–82)
LYMPHOCYTES # BLD AUTO: 3.28 X10*3/UL (ref 1.2–4.8)
LYMPHOCYTES NFR BLD AUTO: 28.5 %
MAGNESIUM SERPL-MCNC: 1.83 MG/DL (ref 1.6–2.4)
MCH RBC QN AUTO: 32.3 PG (ref 26–34)
MCHC RBC AUTO-ENTMCNC: 35.1 G/DL (ref 32–36)
MCV RBC AUTO: 92 FL (ref 80–100)
METHADONE UR QL SCN: ABNORMAL
MONOCYTES # BLD AUTO: 0.77 X10*3/UL (ref 0.1–1)
MONOCYTES NFR BLD AUTO: 6.7 %
MUCOUS THREADS #/AREA URNS AUTO: NORMAL /LPF
NEUTROPHILS # BLD AUTO: 7.21 X10*3/UL (ref 1.2–7.7)
NEUTROPHILS NFR BLD AUTO: 62.8 %
NITRITE UR QL STRIP.AUTO: NEGATIVE
NRBC BLD-RTO: 0 /100 WBCS (ref 0–0)
OPIATES UR QL SCN: ABNORMAL
OXYCODONE+OXYMORPHONE UR QL SCN: ABNORMAL
PCP UR QL SCN: ABNORMAL
PH UR STRIP.AUTO: 5.5 [PH]
PLATELET # BLD AUTO: 203 X10*3/UL (ref 150–450)
POTASSIUM SERPL-SCNC: 3.8 MMOL/L (ref 3.5–5.3)
PROT SERPL-MCNC: 7 G/DL (ref 6.4–8.2)
PROT UR STRIP.AUTO-MCNC: NEGATIVE MG/DL
PROTHROMBIN TIME: 11.5 SECONDS (ref 9.3–12.7)
RBC # BLD AUTO: 4.62 X10*6/UL (ref 4.5–5.9)
RBC # UR STRIP.AUTO: ABNORMAL MG/DL
RBC #/AREA URNS AUTO: NORMAL /HPF
SODIUM SERPL-SCNC: 138 MMOL/L (ref 136–145)
SP GR UR STRIP.AUTO: 1.02
UROBILINOGEN UR STRIP.AUTO-MCNC: NORMAL MG/DL
WBC # BLD AUTO: 11.5 X10*3/UL (ref 4.4–11.3)
WBC #/AREA URNS AUTO: NORMAL /HPF

## 2025-06-05 PROCEDURE — 82077 ASSAY SPEC XCP UR&BREATH IA: CPT | Performed by: PHYSICIAN ASSISTANT

## 2025-06-05 PROCEDURE — 83735 ASSAY OF MAGNESIUM: CPT | Performed by: PHYSICIAN ASSISTANT

## 2025-06-05 PROCEDURE — 36415 COLL VENOUS BLD VENIPUNCTURE: CPT | Performed by: PHYSICIAN ASSISTANT

## 2025-06-05 PROCEDURE — 99285 EMERGENCY DEPT VISIT HI MDM: CPT | Mod: 25 | Performed by: STUDENT IN AN ORGANIZED HEALTH CARE EDUCATION/TRAINING PROGRAM

## 2025-06-05 PROCEDURE — 85610 PROTHROMBIN TIME: CPT | Performed by: PHYSICIAN ASSISTANT

## 2025-06-05 PROCEDURE — 2500000001 HC RX 250 WO HCPCS SELF ADMINISTERED DRUGS (ALT 637 FOR MEDICARE OP): Performed by: PHYSICIAN ASSISTANT

## 2025-06-05 PROCEDURE — 80053 COMPREHEN METABOLIC PANEL: CPT | Performed by: PHYSICIAN ASSISTANT

## 2025-06-05 PROCEDURE — 93005 ELECTROCARDIOGRAM TRACING: CPT

## 2025-06-05 PROCEDURE — 2500000004 HC RX 250 GENERAL PHARMACY W/ HCPCS (ALT 636 FOR OP/ED): Performed by: PHYSICIAN ASSISTANT

## 2025-06-05 PROCEDURE — 81001 URINALYSIS AUTO W/SCOPE: CPT | Performed by: PHYSICIAN ASSISTANT

## 2025-06-05 PROCEDURE — 96361 HYDRATE IV INFUSION ADD-ON: CPT

## 2025-06-05 PROCEDURE — 96374 THER/PROPH/DIAG INJ IV PUSH: CPT

## 2025-06-05 PROCEDURE — 83690 ASSAY OF LIPASE: CPT | Performed by: PHYSICIAN ASSISTANT

## 2025-06-05 PROCEDURE — 96375 TX/PRO/DX INJ NEW DRUG ADDON: CPT

## 2025-06-05 PROCEDURE — 80307 DRUG TEST PRSMV CHEM ANLYZR: CPT | Performed by: PHYSICIAN ASSISTANT

## 2025-06-05 PROCEDURE — 85025 COMPLETE CBC W/AUTO DIFF WBC: CPT | Performed by: PHYSICIAN ASSISTANT

## 2025-06-05 RX ORDER — ONDANSETRON HYDROCHLORIDE 2 MG/ML
4 INJECTION, SOLUTION INTRAVENOUS ONCE
Status: COMPLETED | OUTPATIENT
Start: 2025-06-05 | End: 2025-06-05

## 2025-06-05 RX ORDER — FAMOTIDINE 10 MG/ML
20 INJECTION, SOLUTION INTRAVENOUS ONCE
Status: COMPLETED | OUTPATIENT
Start: 2025-06-05 | End: 2025-06-05

## 2025-06-05 RX ORDER — LORAZEPAM 2 MG/ML
0.5 INJECTION INTRAMUSCULAR EVERY 2 HOUR PRN
Status: DISCONTINUED | OUTPATIENT
Start: 2025-06-05 | End: 2025-06-05 | Stop reason: HOSPADM

## 2025-06-05 RX ORDER — LORAZEPAM 2 MG/ML
1 INJECTION INTRAMUSCULAR EVERY 2 HOUR PRN
Status: DISCONTINUED | OUTPATIENT
Start: 2025-06-05 | End: 2025-06-05 | Stop reason: HOSPADM

## 2025-06-05 RX ORDER — FOLIC ACID 1 MG/1
1 TABLET ORAL DAILY
Status: DISCONTINUED | OUTPATIENT
Start: 2025-06-05 | End: 2025-06-05 | Stop reason: HOSPADM

## 2025-06-05 RX ORDER — LANOLIN ALCOHOL/MO/W.PET/CERES
100 CREAM (GRAM) TOPICAL DAILY
Status: DISCONTINUED | OUTPATIENT
Start: 2025-06-05 | End: 2025-06-05 | Stop reason: HOSPADM

## 2025-06-05 RX ORDER — LORAZEPAM 2 MG/ML
2 INJECTION INTRAMUSCULAR EVERY 2 HOUR PRN
Status: DISCONTINUED | OUTPATIENT
Start: 2025-06-05 | End: 2025-06-05 | Stop reason: HOSPADM

## 2025-06-05 RX ORDER — MULTIVIT-MIN/IRON FUM/FOLIC AC 7.5 MG-4
1 TABLET ORAL DAILY
Status: DISCONTINUED | OUTPATIENT
Start: 2025-06-05 | End: 2025-06-05 | Stop reason: HOSPADM

## 2025-06-05 RX ADMIN — FOLIC ACID 1 MG: 1 TABLET ORAL at 15:45

## 2025-06-05 RX ADMIN — Medication 1 TABLET: at 15:45

## 2025-06-05 RX ADMIN — SODIUM CHLORIDE 500 ML: 900 INJECTION, SOLUTION INTRAVENOUS at 15:36

## 2025-06-05 RX ADMIN — LORAZEPAM 2 MG: 2 INJECTION INTRAMUSCULAR; INTRAVENOUS at 15:45

## 2025-06-05 RX ADMIN — ONDANSETRON 4 MG: 2 INJECTION, SOLUTION INTRAMUSCULAR; INTRAVENOUS at 15:44

## 2025-06-05 RX ADMIN — Medication 100 MG: at 15:45

## 2025-06-05 RX ADMIN — FAMOTIDINE 20 MG: 10 INJECTION INTRAVENOUS at 15:44

## 2025-06-05 SDOH — HEALTH STABILITY: MENTAL HEALTH: FOR HIGH RISK PATIENTS: ALL INTERVENTIONS ABOVE, PLUS:

## 2025-06-05 SDOH — HEALTH STABILITY: MENTAL HEALTH: HAVE YOU ACTUALLY HAD ANY THOUGHTS OF KILLING YOURSELF?: NO

## 2025-06-05 SDOH — HEALTH STABILITY: MENTAL HEALTH: BEHAVIORAL HEALTH(WDL): WITHIN DEFINED LIMITS

## 2025-06-05 SDOH — HEALTH STABILITY: MENTAL HEALTH: SUICIDE ASSESSMENT: ADULT (C-SSRS)

## 2025-06-05 SDOH — HEALTH STABILITY: MENTAL HEALTH: HAVE YOU EVER DONE ANYTHING, STARTED TO DO ANYTHING, OR PREPARED TO DO ANYTHING TO END YOUR LIFE?: NO

## 2025-06-05 SDOH — HEALTH STABILITY: MENTAL HEALTH: HAVE YOU WISHED YOU WERE DEAD OR WISHED YOU COULD GO TO SLEEP AND NOT WAKE UP?: NO

## 2025-06-05 SDOH — HEALTH STABILITY: MENTAL HEALTH
OTHER SUICIDE PRECAUTIONS INCLUDE: PATIENT PLACED IN AN EASILY OBSERVABLE ROOM WITH DOOR/CURTAIN REMAINING OPEN;PATIENT PLACED IN GOWN (SNAPS OR PAPER GOWNS PREFERRED) AND WANDED;REMAINING RISKS IDENTIFIED AND MITIGATED;PATIENT PLACED IN PSYCH SAFE ROOM (IF AVAILABLE);PROVIDER NOTIFIED;ELOPEMENT RISK IDENTIFIED;FAMILY/VISITOR ADVISED TO MAINTAIN CONTROL OF OWN PERSONAL BELONGINGS IN ROOM;FREQUENT ROUNDING WITH IRREGULAR CHECKS AT MINIMUM OF EVERY 15 MINUTES TO ASSESS PSYCH SAFETY PERFORMED;HOME MEDICATION LIST COLLECTED AND SHARED WITH PROVIDER;HOURLY BEHAVIORAL ASSESSMENT PERFORMED;PERSONAL BELONGINGS SECURED;TREATMENT PLAN BASED ON RISK FACTORS DEVELOPED (ED ONLY - IF PATIENT IN ED MORE THAN 8 HOURS);VISITORS LIMITED WHEN NECESSARY AND PERSONAL ITEMS SCREENED

## 2025-06-05 ASSESSMENT — ABNORMAL INVOLUNTARY MOVEMENT SCALE (AIMS)
LIPS_PARIETAL: NONE, NORMAL
JAW: NONE, NORMAL
CURRENT_PROBLEMS_TEETH_DENTURES: NO
UPPER_BODY_EXTREMITIES: NONE, NORMAL
TONGUE: NONE, NORMAL
AIMS_PATIENT_INCAPACITATION: NONE, NORMAL
NECK_SHOULDER_HIPS: NONE, NORMAL
AIMS_PATIENT_AWARENESS: AWARE, NO DISTRESS
PATIENT_WEARS_DENTURES: NO
LOWER_BODY_EXTREMITIES: NONE, NORMAL
FACIAL_EXPRESSION_MUSCLES: NONE, NORMAL

## 2025-06-05 ASSESSMENT — LIFESTYLE VARIABLES
VISUAL DISTURBANCES: NOT PRESENT
AGITATION: NORMAL ACTIVITY
VISUAL DISTURBANCES: NOT PRESENT
TOTAL SCORE: 4
TACTILE DISTURBANCES: VERY MILD ITCHING, PINS AND NEEDLES, BURNING OR NUMBNESS
PAROXYSMAL SWEATS: BARELY PERCEPTIBLE SWEATING, PALMS MOIST
ANXIETY: 3
HAVE YOU EVER FELT YOU SHOULD CUT DOWN ON YOUR DRINKING: YES
ORIENTATION AND CLOUDING OF SENSORIUM: ORIENTED AND CAN DO SERIAL ADDITIONS
ANXIETY: MILDLY ANXIOUS
TREMOR: NOT VISIBLE, BUT CAN BE FELT FINGERTIP TO FINGERTIP
ORIENTATION AND CLOUDING OF SENSORIUM: ORIENTED AND CAN DO SERIAL ADDITIONS
TREMOR: 2
HEADACHE, FULLNESS IN HEAD: NOT PRESENT
AGITATION: NORMAL ACTIVITY
TACTILE DISTURBANCES: MILD ITCHING, PINS AND NEEDLES, BURNING OR NUMBNESS
PAROXYSMAL SWEATS: NO SWEAT VISIBLE
TOTAL SCORE: 4
HAVE PEOPLE ANNOYED YOU BY CRITICIZING YOUR DRINKING: YES
AUDITORY DISTURBANCES: NOT PRESENT
NAUSEA AND VOMITING: MILD NAUSEA WITH NO VOMITING
BLOOD PRESSURE: 164/71
TOTAL SCORE: 10
PULSE: 80
EVER FELT BAD OR GUILTY ABOUT YOUR DRINKING: YES
NAUSEA AND VOMITING: MILD NAUSEA WITH NO VOMITING
HEADACHE, FULLNESS IN HEAD: VERY MILD
AUDITORY DISTURBANCES: NOT PRESENT
EVER HAD A DRINK FIRST THING IN THE MORNING TO STEADY YOUR NERVES TO GET RID OF A HANGOVER: YES

## 2025-06-05 ASSESSMENT — PAIN - FUNCTIONAL ASSESSMENT
PAIN_FUNCTIONAL_ASSESSMENT: 0-10
PAIN_FUNCTIONAL_ASSESSMENT: 0-10

## 2025-06-05 ASSESSMENT — PAIN DESCRIPTION - PAIN TYPE
TYPE: ACUTE PAIN
TYPE: ACUTE PAIN

## 2025-06-05 ASSESSMENT — PAIN SCALES - GENERAL
PAINLEVEL_OUTOF10: 10 - WORST POSSIBLE PAIN
PAINLEVEL_OUTOF10: 10 - WORST POSSIBLE PAIN

## 2025-06-05 ASSESSMENT — PAIN DESCRIPTION - LOCATION
LOCATION: ABDOMEN
LOCATION: ABDOMEN

## 2025-06-05 NOTE — ED PROVIDER NOTES
HPI   Chief Complaint   Patient presents with    Alcohol Problem     Pt comes in seeking detox from alcohol, drinks 3 pints of vodka a day. Last drink was 3 hours ago. Pt has also been off of his antidepressants for a month. Pt keeps stating something has to be wrong with him and he does not feel well       65-year-old male presented emergency department with a chief complaint of alcohol abuse and intoxication.  He drinks daily.  He wants to stop drinking.  He was drinking vodka prior to arrival.  He denies suicidality homicidality.  Well-appearing nontoxic afebrile.  Started detox previously.  Complains of some epigastric discomfort while drinking.  No injury or trauma.  No other complaints              Patient History   Medical History[1]  Surgical History[2]  Family History[3]  Social History[4]    Physical Exam   ED Triage Vitals   Temperature Heart Rate Respirations BP   06/05/25 1525 06/05/25 1525 06/05/25 1525 06/05/25 1525   36.3 °C (97.3 °F) 86 18 (!) 172/127      Pulse Ox Temp Source Heart Rate Source Patient Position   06/05/25 1525 06/05/25 1534 06/05/25 1534 06/05/25 1534   98 % Oral Monitor Lying      BP Location FiO2 (%)     06/05/25 1534 --     Left arm        Physical Exam  Vitals and nursing note reviewed.   Constitutional:       Appearance: Normal appearance.   HENT:      Head: Normocephalic.      Nose: Nose normal.      Mouth/Throat:      Mouth: Mucous membranes are moist.   Cardiovascular:      Rate and Rhythm: Normal rate.      Pulses: Normal pulses.   Pulmonary:      Effort: Pulmonary effort is normal.      Breath sounds: Normal breath sounds.   Abdominal:      General: Abdomen is flat.   Musculoskeletal:         General: Normal range of motion.      Cervical back: Normal range of motion.   Skin:     General: Skin is warm.   Neurological:      General: No focal deficit present.      Mental Status: He is alert and oriented to person, place, and time.   Psychiatric:         Mood and Affect: Mood  normal.           ED Course & MDM   ED Course as of 06/05/25 1700   Thu Jun 05, 2025   1556 I personally reviewed and interpreted the EKG @1545: NSR 77, normal axis/intervals and no appreciable ischemia, and prior EKG on 5/7/2025 reviewed without any appreciable specific/identifiable changes [BC]      ED Course User Index  [BC] Harsh Salvador MD         Diagnoses as of 06/05/25 1700   Alcohol abuse   Alcoholic intoxication without complication                 No data recorded     Divine Coma Scale Score: 15 (06/05/25 1526 : Luz Macias, RN)                           Medical Decision Making  I have seen and evaluated this patient.  The attending physician has also seen and evaluated this patient.  Vital signs, laboratory testing and diagnostic images if applicable have been reviewed.  All laboratory and imaging is interpreted by myself unless otherwise stated.  Radiology studies are also formally interpreted by radiologist.     CBC without significant leukocytosis or anemia, metabolic panel without significant renal impairment or electrolyte abnormality.  Patient is medically cleared from an emergency medical standpoint for treatment at inpatient detoxification or psychiatric facility.    Labs Reviewed  CBC WITH AUTO DIFFERENTIAL - Abnormal     WBC                           11.5 (*)               nRBC                          0.0                    RBC                           4.62                   Hemoglobin                    14.9                   Hematocrit                    42.5                   MCV                           92                     MCH                           32.3                   MCHC                          35.1                   RDW                           12.4                   Platelets                     203                    Neutrophils %                 62.8                   Immature Granulocytes %, Automated   0.5                    Lymphocytes %                  28.5                   Monocytes %                   6.7                    Eosinophils %                 0.7                    Basophils %                   0.8                    Neutrophils Absolute          7.21                   Immature Granulocytes Absolute, Au*   0.06                   Lymphocytes Absolute          3.28                   Monocytes Absolute            0.77                   Eosinophils Absolute          0.08                   Basophils Absolute            0.09                COMPREHENSIVE METABOLIC PANEL - Abnormal     Glucose                       90                     Sodium                        138                    Potassium                     3.8                    Chloride                      104                    Bicarbonate                   24                     Anion Gap                     14                     Urea Nitrogen                 15                     Creatinine                    0.72                   eGFR                          >90                    Calcium                       8.5 (*)                Albumin                       4.3                    Alkaline Phosphatase          51                     Total Protein                 7.0                    AST                           26                     Bilirubin, Total              0.7                    ALT                           17                  ALCOHOL - Abnormal     Alcohol                       183 (*)             PROTIME-INR - Normal     Protime                       11.5                   INR                           1.1                      Narrative: INR Therapeutic Range: 2.0-3.5  MAGNESIUM - Normal     Magnesium                     1.83                LIPASE - Normal     Lipase                        75                       Narrative: Venipuncture immediately after or during the administration of Metamizole may lead to falsely low results. Testing should be performed immediately  prior to Metamizole dosing.  URINALYSIS WITH REFLEX CULTURE AND MICROSCOPIC       Narrative: The following orders were created for panel order Urinalysis with Reflex Culture and Microscopic.                Procedure                               Abnormality         Status                                   ---------                               -----------         ------                                   Urinalysis with Reflex C...[[id number]]                                                               Extra Urine Gray Tube[[id number]]                                                                                     Please view results for these tests on the individual orders.  DRUG SCREEN,URINE  URINALYSIS WITH REFLEX CULTURE AND MICROSCOPIC  EXTRA URINE GRAY TUBE  No orders to display  Medications  folic acid (Folvite) tablet 1 mg (1 mg oral Given 6/5/25 1545)  multivitamin with minerals 1 tablet (1 tablet oral Given 6/5/25 1545)  LORazepam (Ativan) injection 0.5 mg ( intravenous See Alternative 6/5/25 1545)    Or  LORazepam (Ativan) injection 1 mg ( intravenous See Alternative 6/5/25 1545)     Or  LORazepam (Ativan) injection 2 mg (2 mg intravenous Given 6/5/25 1545)  thiamine (Vitamin B-1) tablet 100 mg (100 mg oral Given 6/5/25 1545)  famotidine PF (Pepcid) injection 20 mg (20 mg intravenous Given 6/5/25 1544)   sodium chloride 0.9 % bolus 500 mL (500 mL intravenous New Bag 6/5/25 1536)  ondansetron (Zofran) injection 4 mg (4 mg intravenous Given 6/5/25 1544)  New Prescriptions  No medications on file            Procedure  Procedures       [1]   Past Medical History:  Diagnosis Date    Alcohol use     Depression     Hypertension    [2] No past surgical history on file.  [3]   Family History  Problem Relation Name Age of Onset    Hypertension Other      Heart disease Other     [4]   Social History  Tobacco Use    Smoking status: Every Day     Types: Cigarettes    Smokeless tobacco: Not on file   Substance Use  Topics    Alcohol use: Yes     Comment: 3 pints of vodka a day    Drug use: Yes     Types: Marijuana        Julius Wiley PA-C  06/05/25 7361

## 2025-06-05 NOTE — PROGRESS NOTES
Patient accepted to [facility] [address] unit by Dr. Pathak. Nursing report [phone].

## 2025-06-05 NOTE — PROGRESS NOTES
Social Work Note  SS consult placed for 64y/o male who presented to [facility] ED as a walk-in seeking alcohol detox. Patient with hx numerous ED visits related to alcohol use, most recently 5/7/25. Patient declined treatment at that time. Patient reports drinking at least 3 pints of vodka per day for at least the past 1 month. Last drink earlier this afternoon. Patient states that he is beginning to feel tremulous and his stomach is upset. Denies hx withdrawal seizures. Patient states he has wanted to get treatment for the past 2 days, but has been struggling with transportation. States he is currently living with friends who also drink alcohol, and that this is not the way he wants to live. BAL on arrival 183. Patient verbalizing desire for detox treatment at this time.

## 2025-06-05 NOTE — ED PROVIDER NOTES
The patient was seen in conjunction with the advanced practice provider, and I performed a substantive portion of the encounter. The following is my supervisory note.    History:  Patient presents ED requesting EtOH rehab endorses significant EtOH consumption over the last month about 3 pints of vodka a day.  Notes significant epigastric pain attempted to treat with vodka and Pepto-Bismol mixed the other.  Describes her pain as a deep ache, believes he has had pancreatitis before.  Notes no radiation to his back at this time.  No alleviating factors.  Notes nausea but no bouts of emesis.  Denies any notable melena.  Denies any urinary symptoms.  Has not appreciated fever/chills.  Does not endorse any associate abdominal distention/increased tympany.  Notes no abdominal surgical history.  Notes he has not had any of his psych meds for the last month.  Denies any SI/HI or AVH at this time.  Denies any other significant systemic symptoms or complaints.  Denies any other significant substance use.    VS:  BP (!) 145/98   Pulse 92   Temp 36.4 °C (97.5 °F)   Resp 17   Ht 1.829 m (6')   Wt 86.2 kg (190 lb)   SpO2 96%   BMI 25.77 kg/m²      Physical exam:  CONST: alert, normal appearance, no acute distress, does not appear ill/toxic  HEAD: normocephalic, atraumatic  ENT: MMM, no rhinorrhea/congestion, posterior oropharynx clear and oral secretions well controlled  EYES: PEPRL 4 to 2 mm, EOMI, mild conjunctival injection bilaterally, no scleral icterus, mild bilateral horizontal nystagmus  CV: RRR, no murmurs, 2+ equal/symmetrical pulses x4  PULM: CTAB, no respiratory distress, not requiring supplemental O2  ABD: soft, protuberant/non-distended, diffuse upper abdomen tenderness with increased focality of epigastrium, no abdominal wall rigidity or voluntary guarding and not endorsing any associated rebound tenderness, no appreciable epigastric fullness/mass  MSK: No obvious injuries/deformities, no pain with palpation  x4  SKIN: warm/dry, no pallor or jaundice, no rash  NEURO: A&Ox4, no facial asymmetry, no focal neuro deficits, gross strength/motor/sensation intact x4, mildly unstable gait, appears intoxicated        I personally reviewed and interpreted the following studies: EKG is NSR 77, normal axis/intervals, no appreciable ischemia, labs are significant for mild to mildly elevated EtOH, images are notable for N/A.      MDM:  Patient presented to the ED for evaluation for alcohol intoxication requesting alcohol rehab/detox.  Concerning PMHx of EtOH use/dependency, self-reported pancreatitis, depression, HTN.    Per Chart Review: Numerous ED encounters for EtOH use/abuse and SI with infrequent admissions.    Assessment/evaluation consistent with moderate EtOH use/dependency complicated by likely alcohol induced gastritis. No concerning history, clinical evidence/work-up, or exam findings for the concerning differentials of traumatic/infectious/metabolic/electrolyte encephalopathy, hemorrhagic gastritis/PUD, bowel perforation/pneumoperitoneum, SBO, significant electrolyte/metabolic derangement, significant EtOH withdrawal symptoms/DTs/seizures. These conditions have been thoroughly evaluated and determined to be sufficiently unlikely to be the etiology of patient's presenting symptoms.       ED Course/Diagnosis:  ED Course as of 06/06/25 1325   Thu Jun 05, 2025   1556 I personally reviewed and interpreted the EKG @1545: NSR 77, normal axis/intervals and no appreciable ischemia, and prior EKG on 5/7/2025 reviewed without any appreciable specific/identifiable changes [BC]   1907 Patient is medically cleared for further evaluation management of substance use requesting rehab [BC]      ED Course User Index  [BC] Harsh Salvador MD         Diagnoses as of 06/06/25 1325   Alcohol abuse   Alcoholic intoxication without complication       1. Alcohol abuse        2. Alcoholic intoxication without complication                 Harsh  WILBER Salvador MD  06/06/25 3203

## 2025-06-06 LAB
ATRIAL RATE: 77 BPM
HOLD SPECIMEN: NORMAL
P AXIS: 66 DEGREES
P OFFSET: 184 MS
P ONSET: 132 MS
PR INTERVAL: 182 MS
Q ONSET: 223 MS
QRS COUNT: 12 BEATS
QRS DURATION: 92 MS
QT INTERVAL: 374 MS
QTC CALCULATION(BAZETT): 423 MS
QTC FREDERICIA: 406 MS
R AXIS: 66 DEGREES
T AXIS: 52 DEGREES
T OFFSET: 410 MS
VENTRICULAR RATE: 77 BPM

## 2025-07-21 ENCOUNTER — HOSPITAL ENCOUNTER (EMERGENCY)
Facility: HOSPITAL | Age: 65
Discharge: HOME | End: 2025-07-21
Payer: COMMERCIAL

## 2025-07-21 ENCOUNTER — APPOINTMENT (OUTPATIENT)
Dept: RADIOLOGY | Facility: HOSPITAL | Age: 65
End: 2025-07-21
Payer: COMMERCIAL

## 2025-07-21 ENCOUNTER — APPOINTMENT (OUTPATIENT)
Dept: CARDIOLOGY | Facility: HOSPITAL | Age: 65
End: 2025-07-21
Payer: COMMERCIAL

## 2025-07-21 VITALS
BODY MASS INDEX: 25.73 KG/M2 | HEIGHT: 72 IN | HEART RATE: 94 BPM | RESPIRATION RATE: 18 BRPM | TEMPERATURE: 98.2 F | OXYGEN SATURATION: 96 % | WEIGHT: 190 LBS

## 2025-07-21 DIAGNOSIS — K92.1 BLACK STOOL: Primary | ICD-10-CM

## 2025-07-21 LAB
ALBUMIN SERPL BCP-MCNC: 4.1 G/DL (ref 3.4–5)
ALP SERPL-CCNC: 58 U/L (ref 33–136)
ALT SERPL W P-5'-P-CCNC: 25 U/L (ref 10–52)
ANION GAP SERPL CALCULATED.3IONS-SCNC: 15 MMOL/L (ref 10–20)
AST SERPL W P-5'-P-CCNC: 32 U/L (ref 9–39)
BASOPHILS # BLD AUTO: 0.08 X10*3/UL (ref 0–0.1)
BASOPHILS NFR BLD AUTO: 0.9 %
BILIRUB SERPL-MCNC: 0.8 MG/DL (ref 0–1.2)
BUN SERPL-MCNC: 9 MG/DL (ref 6–23)
CALCIUM SERPL-MCNC: 8.7 MG/DL (ref 8.6–10.3)
CHLORIDE SERPL-SCNC: 99 MMOL/L (ref 98–107)
CO2 SERPL-SCNC: 23 MMOL/L (ref 21–32)
CREAT SERPL-MCNC: 0.76 MG/DL (ref 0.5–1.3)
EGFRCR SERPLBLD CKD-EPI 2021: >90 ML/MIN/1.73M*2
EOSINOPHIL # BLD AUTO: 0.13 X10*3/UL (ref 0–0.7)
EOSINOPHIL NFR BLD AUTO: 1.4 %
ERYTHROCYTE [DISTWIDTH] IN BLOOD BY AUTOMATED COUNT: 12.9 % (ref 11.5–14.5)
GLUCOSE SERPL-MCNC: 81 MG/DL (ref 74–99)
HCT VFR BLD AUTO: 39 % (ref 41–52)
HGB BLD-MCNC: 14.5 G/DL (ref 13.5–17.5)
IMM GRANULOCYTES # BLD AUTO: 0.05 X10*3/UL (ref 0–0.7)
IMM GRANULOCYTES NFR BLD AUTO: 0.5 % (ref 0–0.9)
LIPASE SERPL-CCNC: 48 U/L (ref 9–82)
LYMPHOCYTES # BLD AUTO: 2.73 X10*3/UL (ref 1.2–4.8)
LYMPHOCYTES NFR BLD AUTO: 29.8 %
MCH RBC QN AUTO: 33 PG (ref 26–34)
MCHC RBC AUTO-ENTMCNC: 37.2 G/DL (ref 32–36)
MCV RBC AUTO: 89 FL (ref 80–100)
MONOCYTES # BLD AUTO: 0.64 X10*3/UL (ref 0.1–1)
MONOCYTES NFR BLD AUTO: 7 %
NEUTROPHILS # BLD AUTO: 5.53 X10*3/UL (ref 1.2–7.7)
NEUTROPHILS NFR BLD AUTO: 60.4 %
NRBC BLD-RTO: 0 /100 WBCS (ref 0–0)
PLATELET # BLD AUTO: 173 X10*3/UL (ref 150–450)
POTASSIUM SERPL-SCNC: 3.3 MMOL/L (ref 3.5–5.3)
PROT SERPL-MCNC: 6.5 G/DL (ref 6.4–8.2)
RBC # BLD AUTO: 4.39 X10*6/UL (ref 4.5–5.9)
SODIUM SERPL-SCNC: 134 MMOL/L (ref 136–145)
WBC # BLD AUTO: 9.2 X10*3/UL (ref 4.4–11.3)

## 2025-07-21 PROCEDURE — 2500000001 HC RX 250 WO HCPCS SELF ADMINISTERED DRUGS (ALT 637 FOR MEDICARE OP): Performed by: NURSE PRACTITIONER

## 2025-07-21 PROCEDURE — 83690 ASSAY OF LIPASE: CPT | Performed by: NURSE PRACTITIONER

## 2025-07-21 PROCEDURE — 80053 COMPREHEN METABOLIC PANEL: CPT | Performed by: NURSE PRACTITIONER

## 2025-07-21 PROCEDURE — 2500000004 HC RX 250 GENERAL PHARMACY W/ HCPCS (ALT 636 FOR OP/ED): Performed by: NURSE PRACTITIONER

## 2025-07-21 PROCEDURE — 36415 COLL VENOUS BLD VENIPUNCTURE: CPT | Performed by: NURSE PRACTITIONER

## 2025-07-21 PROCEDURE — 96374 THER/PROPH/DIAG INJ IV PUSH: CPT

## 2025-07-21 PROCEDURE — 71045 X-RAY EXAM CHEST 1 VIEW: CPT

## 2025-07-21 PROCEDURE — 85025 COMPLETE CBC W/AUTO DIFF WBC: CPT | Performed by: NURSE PRACTITIONER

## 2025-07-21 PROCEDURE — 71045 X-RAY EXAM CHEST 1 VIEW: CPT | Performed by: RADIOLOGY

## 2025-07-21 PROCEDURE — 99285 EMERGENCY DEPT VISIT HI MDM: CPT | Mod: 25

## 2025-07-21 PROCEDURE — 2500000005 HC RX 250 GENERAL PHARMACY W/O HCPCS: Performed by: NURSE PRACTITIONER

## 2025-07-21 PROCEDURE — 93005 ELECTROCARDIOGRAM TRACING: CPT

## 2025-07-21 RX ORDER — FAMOTIDINE 10 MG/ML
20 INJECTION, SOLUTION INTRAVENOUS ONCE
Status: COMPLETED | OUTPATIENT
Start: 2025-07-21 | End: 2025-07-21

## 2025-07-21 RX ORDER — LIDOCAINE HYDROCHLORIDE 20 MG/ML
1.25 SOLUTION OROPHARYNGEAL ONCE
Status: COMPLETED | OUTPATIENT
Start: 2025-07-21 | End: 2025-07-21

## 2025-07-21 RX ORDER — ALUMINUM HYDROXIDE, MAGNESIUM HYDROXIDE, AND SIMETHICONE 1200; 120; 1200 MG/30ML; MG/30ML; MG/30ML
30 SUSPENSION ORAL ONCE
Status: COMPLETED | OUTPATIENT
Start: 2025-07-21 | End: 2025-07-21

## 2025-07-21 RX ADMIN — FAMOTIDINE 20 MG: 10 INJECTION, SOLUTION INTRAVENOUS at 18:44

## 2025-07-21 RX ADMIN — ALUMINUM HYDROXIDE, MAGNESIUM HYDROXIDE, AND DIMETHICONE 30 ML: 200; 20; 200 SUSPENSION ORAL at 18:44

## 2025-07-21 RX ADMIN — LIDOCAINE HYDROCHLORIDE 1.25 ML: 20 SOLUTION ORAL at 18:44

## 2025-07-21 NOTE — ED TRIAGE NOTES
Pt brought in by EMS for dark stools for a couple weeks. Pt states last drink was 3 hours ago. Drinks 1 pint a day. Pt states he chases his Vodka with pepto bismol. Having dark stools.

## 2025-07-21 NOTE — DISCHARGE INSTRUCTIONS
Please return to the emergency room immediately if new or worsening symptoms occur. Symptoms which are concerning are bloody stool, fever, changing or worsening pain, or intractable vomiting. These necessitate immediate return to the emergency department.    Thank you for allowing us to take care of you today. While you are home, you might receive a survey about your care in our hospital. Your nurse and myself, Rasheed Adame CNP, would love your honest feedback on your care. Your feedback and especially your positive comments help our hospital receive the support we need to continue to serve you and your family. Thank you again for trusting us with your care.

## 2025-07-21 NOTE — ED PROVIDER NOTES
HPI   Chief Complaint   Patient presents with    Black or Bloody Stool       HPI    See my MDM    Patient History   Medical History[1]  Surgical History[2]  Family History[3]  Social History[4]    Physical Exam   ED Triage Vitals [07/21/25 1809]   Temperature Heart Rate Respirations BP   36.8 °C (98.2 °F) 94 18 --      Pulse Ox Temp src Heart Rate Source Patient Position   96 % -- -- --      BP Location FiO2 (%)     -- --       Physical Exam    CONSTITUTIONAL: Vital signs reviewed as charted, well-developed and in no distress  Eyes: Extraocular muscles are intact. Pupils equal round and reactive to light. Conjunctiva are pink.    ENT: Mucous membranes are moist. Tongue in the midline. Pharynx was without erythema or exudates, uvula midline  LUNGS: Breath sounds equal and clear to auscultation. Good air exchange, no wheezes rales or retractions, pulse oximetry is charted.  HEART: Regular rate and rhythm without murmur thrill or rub, strong tones, auscultation is normal.  ABDOMEN: Soft and nontender without guarding rebound rigidity or mass. Bowel sounds are present and normal in all quadrants. There is no palpable masses or aneurysms identified. No hepatosplenomegaly, normal abdominal exam.  Neuro: The patient is awake, alert and oriented ×3. Moving all 4 extremities and answering questions appropriately.   MUSCULOSKELETAL: The calves are nontender to palpation. Full gross active range of motion.   PSYCH: Awake alert oriented, normal mood and affect.  Skin:  Dry, normal color, warm to the touch, no rash present.      ED Course & [facility]   ED Course as of 07/22/25 1513   Mon Jul 21, 2025 1837 ECG 12 Lead  Performed at  1822, HR of 95, NSR, NAD, QTc 427, no sign of STEMI, no Q wave or T wave abnormality noted.    Reviewed and interpreted by me at time performed   [JM]      ED Course User Index  [JAN] Mora Canales MD         Diagnoses as of 07/22/25 1513   Black stool                 No data recorded                                  Medical Decision Making  History obtained from: patient    Vital signs, nursing notes, current medications, past medical history, Surgical history, allergies, social history, family History were reviewed.         HPI:  Patient 65-year-old gentleman presenting emergency room today stating has had dark stools for about 3 to 4 weeks.  He does admit to being a heavy alcoholic and states over the last 3 to 4 weeks he has been chasing his vodka shots with Pepto-Bismol.  He denies dizziness, chest pain, shortness of breath, abdominal pain or extremity edema.  He is nontoxic and well-appearing      10 point ROS was reviewed and negative except Noted above in HPI.  DDX: as listed above          MDM Summary/considerations:  Labs Reviewed   CBC WITH AUTO DIFFERENTIAL - Abnormal       Result Value    WBC 9.2      nRBC 0.0      RBC 4.39 (*)     Hemoglobin 14.5      Hematocrit 39.0 (*)     MCV 89      MCH 33.0      MCHC 37.2 (*)     RDW 12.9      Platelets 173      Neutrophils % 60.4      Immature Granulocytes %, Automated 0.5      Lymphocytes % 29.8      Monocytes % 7.0      Eosinophils % 1.4      Basophils % 0.9      Neutrophils Absolute 5.53      Immature Granulocytes Absolute, Automated 0.05      Lymphocytes Absolute 2.73      Monocytes Absolute 0.64      Eosinophils Absolute 0.13      Basophils Absolute 0.08     COMPREHENSIVE METABOLIC PANEL - Abnormal    Glucose 81      Sodium 134 (*)     Potassium 3.3 (*)     Chloride 99      Bicarbonate 23      Anion Gap 15      Urea Nitrogen 9      Creatinine 0.76      eGFR >90      Calcium 8.7      Albumin 4.1      Alkaline Phosphatase 58      Total Protein 6.5      AST 32      Bilirubin, Total 0.8      ALT 25     LIPASE - Normal    Lipase 48      Narrative:     Venipuncture immediately after or during the administration of Metamizole may lead to falsely low results. Testing should be performed immediately prior to Metamizole dosing.     XR chest 1 view   Final  Result   1.  No radiographic evidence of acute cardiopulmonary process.                  MACRO:   None.        Signed by: Wilda Hernandez 7/21/2025 6:43 PM   Dictation workstation:   [id number]        Medications   alum-mag hydroxide-simeth (Mylanta) 200-200-20 mg/5 mL oral suspension 30 mL (30 mL oral Given 7/21/25 1844)   famotidine PF (Pepcid) injection 20 mg (20 mg intravenous Given 7/21/25 1844)   lidocaine (Xylocaine) 2 % mouth solution 1.25 mL (1.25 mL Mouth/Throat Given 7/21/25 1844)     Discharge Medication List as of 7/21/2025  7:14 PM        I estimate there is a low risk for acute appendicitis, bowel extraction, cystitis, diverticulitis, incarcerated hernia, mesenteric ischemia, pancreatitis, or perforated bowel or ulcer, thus I considered the discharge disposition reasonable. There is no evidence of peritonitis, sepsis, or toxicity. I have discussed the diagnosis and risks, and we agreed with discharging home to follow-up with the primary care doctor. We also discussed returning to the emergency department immediately if new or worsening symptoms occur. Symptoms of most concern that we discussed are bloody stool, fever, changing or worsening pain, or vomiting that necessitates immediate return.    Hemoccult was negative, blood counts normal.  Patient will be discharged home in stable condition and is likely to the patient's Pepto-Bismol use.    All of the patient's questions were answered to the best of my ability.  Patient states understanding that they have been screened for an emergency today and we have not found any etiology of symptoms that requires emergent treatment or admission to the hospital at this point. They understand that they have not had definitive care day and require follow-up for treatment of their condition. They also state understanding that they may have an emergent condition that may potentially have not of detected at this visit and they must return to the emergency  department if they develop any worsening of symptoms or new complaints.      Discussed H&P with supervising physician, aware of results and agrees with plan/ disposition.            Critical Care:  Not warranted at this time      This chart was completed using voice recognition transcription software. Please excuse any errors of transcription including grammatical, punctuation, syntax and spelling errors.  Please contact me with any questions regarding this chart.    Procedure  Procedures       CORTEZ Frye  07/21/25 1915       [1]   Past Medical History:  Diagnosis Date    Alcohol use     Depression     Hypertension    [2] No past surgical history on file.  [3]   Family History  Problem Relation Name Age of Onset    Hypertension Other      Heart disease Other     [4]   Social History  Tobacco Use    Smoking status: Every Day     Types: Cigarettes    Smokeless tobacco: Not on file   Substance Use Topics    Alcohol use: Yes     Comment: 3 pints of vodka a day    Drug use: Yes     Types: Marijuana        CORTEZ Frye  07/22/25 2366

## 2025-07-22 LAB
ATRIAL RATE: 95 BPM
P AXIS: 55 DEGREES
P OFFSET: 191 MS
P ONSET: 140 MS
PR INTERVAL: 168 MS
Q ONSET: 224 MS
QRS COUNT: 16 BEATS
QRS DURATION: 90 MS
QT INTERVAL: 340 MS
QTC CALCULATION(BAZETT): 427 MS
QTC FREDERICIA: 396 MS
R AXIS: 24 DEGREES
T AXIS: 56 DEGREES
T OFFSET: 394 MS
VENTRICULAR RATE: 95 BPM

## 2025-07-30 ENCOUNTER — PATIENT OUTREACH (OUTPATIENT)
Dept: CARE COORDINATION | Facility: CLINIC | Age: 65
End: 2025-07-30
Payer: COMMERCIAL

## 2025-07-30 NOTE — PROGRESS NOTES
Outreach call to patient to support a smooth transition of care from recent admission.  Left message with with my contact information.    YIFAN Loo   III   [facility]  Office Phone: [phone]

## 2025-08-01 ENCOUNTER — HOSPITAL ENCOUNTER (EMERGENCY)
Facility: HOSPITAL | Age: 65
Discharge: HOME | End: 2025-08-02
Attending: EMERGENCY MEDICINE
Payer: COMMERCIAL

## 2025-08-01 ENCOUNTER — APPOINTMENT (OUTPATIENT)
Dept: CARDIOLOGY | Facility: HOSPITAL | Age: 65
End: 2025-08-01
Payer: COMMERCIAL

## 2025-08-01 DIAGNOSIS — F10.10 ALCOHOL ABUSE: Primary | ICD-10-CM

## 2025-08-01 LAB
ALBUMIN SERPL BCP-MCNC: 4.4 G/DL (ref 3.4–5)
ALP SERPL-CCNC: 68 U/L (ref 33–136)
ALT SERPL W P-5'-P-CCNC: 44 U/L (ref 10–52)
AMPHETAMINES UR QL SCN: ABNORMAL
ANION GAP SERPL CALCULATED.3IONS-SCNC: 18 MMOL/L (ref 10–20)
APPEARANCE UR: CLEAR
AST SERPL W P-5'-P-CCNC: 51 U/L (ref 9–39)
BARBITURATES UR QL SCN: ABNORMAL
BASOPHILS # BLD AUTO: 0.07 X10*3/UL (ref 0–0.1)
BASOPHILS NFR BLD AUTO: 0.8 %
BENZODIAZ UR QL SCN: ABNORMAL
BILIRUB SERPL-MCNC: 0.6 MG/DL (ref 0–1.2)
BILIRUB UR STRIP.AUTO-MCNC: NEGATIVE MG/DL
BUN SERPL-MCNC: 13 MG/DL (ref 6–23)
BZE UR QL SCN: ABNORMAL
CALCIUM SERPL-MCNC: 9.3 MG/DL (ref 8.6–10.3)
CANNABINOIDS UR QL SCN: ABNORMAL
CAOX CRY #/AREA UR COMP ASSIST: ABNORMAL /HPF
CHLORIDE SERPL-SCNC: 102 MMOL/L (ref 98–107)
CO2 SERPL-SCNC: 22 MMOL/L (ref 21–32)
COLOR UR: YELLOW
CREAT SERPL-MCNC: 1.03 MG/DL (ref 0.5–1.3)
EGFRCR SERPLBLD CKD-EPI 2021: 82 ML/MIN/1.73M*2
EOSINOPHIL # BLD AUTO: 0.11 X10*3/UL (ref 0–0.7)
EOSINOPHIL NFR BLD AUTO: 1.2 %
ERYTHROCYTE [DISTWIDTH] IN BLOOD BY AUTOMATED COUNT: 13 % (ref 11.5–14.5)
ETHANOL SERPL-MCNC: 115 MG/DL
FENTANYL+NORFENTANYL UR QL SCN: ABNORMAL
GLUCOSE SERPL-MCNC: 117 MG/DL (ref 74–99)
GLUCOSE UR STRIP.AUTO-MCNC: ABNORMAL MG/DL
HCT VFR BLD AUTO: 40.9 % (ref 41–52)
HGB BLD-MCNC: 15 G/DL (ref 13.5–17.5)
IMM GRANULOCYTES # BLD AUTO: 0.07 X10*3/UL (ref 0–0.7)
IMM GRANULOCYTES NFR BLD AUTO: 0.8 % (ref 0–0.9)
KETONES UR STRIP.AUTO-MCNC: NEGATIVE MG/DL
LEUKOCYTE ESTERASE UR QL STRIP.AUTO: NEGATIVE
LYMPHOCYTES # BLD AUTO: 2.35 X10*3/UL (ref 1.2–4.8)
LYMPHOCYTES NFR BLD AUTO: 25.4 %
MCH RBC QN AUTO: 34.2 PG (ref 26–34)
MCHC RBC AUTO-ENTMCNC: 36.7 G/DL (ref 32–36)
MCV RBC AUTO: 93 FL (ref 80–100)
METHADONE UR QL SCN: ABNORMAL
MONOCYTES # BLD AUTO: 0.94 X10*3/UL (ref 0.1–1)
MONOCYTES NFR BLD AUTO: 10.2 %
MUCOUS THREADS #/AREA URNS AUTO: ABNORMAL /LPF
NEUTROPHILS # BLD AUTO: 5.71 X10*3/UL (ref 1.2–7.7)
NEUTROPHILS NFR BLD AUTO: 61.6 %
NITRITE UR QL STRIP.AUTO: NEGATIVE
NRBC BLD-RTO: 0 /100 WBCS (ref 0–0)
OPIATES UR QL SCN: ABNORMAL
OXYCODONE+OXYMORPHONE UR QL SCN: ABNORMAL
PCP UR QL SCN: ABNORMAL
PH UR STRIP.AUTO: 5.5 [PH]
PLATELET # BLD AUTO: 197 X10*3/UL (ref 150–450)
POTASSIUM SERPL-SCNC: 3.7 MMOL/L (ref 3.5–5.3)
PROT SERPL-MCNC: 7.1 G/DL (ref 6.4–8.2)
PROT UR STRIP.AUTO-MCNC: NEGATIVE MG/DL
RBC # BLD AUTO: 4.39 X10*6/UL (ref 4.5–5.9)
RBC # UR STRIP.AUTO: ABNORMAL MG/DL
RBC #/AREA URNS AUTO: ABNORMAL /HPF
SODIUM SERPL-SCNC: 138 MMOL/L (ref 136–145)
SP GR UR STRIP.AUTO: 1.03
UROBILINOGEN UR STRIP.AUTO-MCNC: NORMAL MG/DL
WBC # BLD AUTO: 9.3 X10*3/UL (ref 4.4–11.3)
WBC #/AREA URNS AUTO: ABNORMAL /HPF

## 2025-08-01 PROCEDURE — 96374 THER/PROPH/DIAG INJ IV PUSH: CPT

## 2025-08-01 PROCEDURE — 85025 COMPLETE CBC W/AUTO DIFF WBC: CPT | Performed by: EMERGENCY MEDICINE

## 2025-08-01 PROCEDURE — 82077 ASSAY SPEC XCP UR&BREATH IA: CPT | Performed by: EMERGENCY MEDICINE

## 2025-08-01 PROCEDURE — 80053 COMPREHEN METABOLIC PANEL: CPT | Performed by: EMERGENCY MEDICINE

## 2025-08-01 PROCEDURE — 2500000004 HC RX 250 GENERAL PHARMACY W/ HCPCS (ALT 636 FOR OP/ED): Performed by: EMERGENCY MEDICINE

## 2025-08-01 PROCEDURE — 99284 EMERGENCY DEPT VISIT MOD MDM: CPT | Mod: 25 | Performed by: EMERGENCY MEDICINE

## 2025-08-01 PROCEDURE — 2500000001 HC RX 250 WO HCPCS SELF ADMINISTERED DRUGS (ALT 637 FOR MEDICARE OP): Performed by: EMERGENCY MEDICINE

## 2025-08-01 PROCEDURE — 80307 DRUG TEST PRSMV CHEM ANLYZR: CPT | Performed by: EMERGENCY MEDICINE

## 2025-08-01 PROCEDURE — 81001 URINALYSIS AUTO W/SCOPE: CPT | Performed by: EMERGENCY MEDICINE

## 2025-08-01 PROCEDURE — 36415 COLL VENOUS BLD VENIPUNCTURE: CPT | Performed by: EMERGENCY MEDICINE

## 2025-08-01 PROCEDURE — 93005 ELECTROCARDIOGRAM TRACING: CPT

## 2025-08-01 RX ORDER — MULTIVIT-MIN/IRON FUM/FOLIC AC 7.5 MG-4
1 TABLET ORAL DAILY
Status: DISCONTINUED | OUTPATIENT
Start: 2025-08-01 | End: 2025-08-02 | Stop reason: HOSPADM

## 2025-08-01 RX ORDER — LORAZEPAM 0.5 MG/1
0.5 TABLET ORAL EVERY 2 HOUR PRN
Status: DISCONTINUED | OUTPATIENT
Start: 2025-08-01 | End: 2025-08-02 | Stop reason: HOSPADM

## 2025-08-01 RX ORDER — LORAZEPAM 1 MG/1
1 TABLET ORAL EVERY 2 HOUR PRN
Status: DISCONTINUED | OUTPATIENT
Start: 2025-08-01 | End: 2025-08-02 | Stop reason: HOSPADM

## 2025-08-01 RX ORDER — THIAMINE HYDROCHLORIDE 100 MG/ML
100 INJECTION, SOLUTION INTRAMUSCULAR; INTRAVENOUS DAILY
Status: DISCONTINUED | OUTPATIENT
Start: 2025-08-01 | End: 2025-08-02 | Stop reason: HOSPADM

## 2025-08-01 RX ORDER — LORAZEPAM 1 MG/1
2 TABLET ORAL EVERY 2 HOUR PRN
Status: DISCONTINUED | OUTPATIENT
Start: 2025-08-01 | End: 2025-08-02 | Stop reason: HOSPADM

## 2025-08-01 RX ORDER — FOLIC ACID 1 MG/1
1 TABLET ORAL DAILY
Status: DISCONTINUED | OUTPATIENT
Start: 2025-08-01 | End: 2025-08-02 | Stop reason: HOSPADM

## 2025-08-01 RX ADMIN — FOLIC ACID 1 MG: 1 TABLET ORAL at 20:43

## 2025-08-01 RX ADMIN — LORAZEPAM 1 MG: 1 TABLET ORAL at 20:43

## 2025-08-01 RX ADMIN — Medication 1 TABLET: at 20:43

## 2025-08-01 RX ADMIN — THIAMINE HYDROCHLORIDE 100 MG: 100 INJECTION, SOLUTION INTRAMUSCULAR; INTRAVENOUS at 20:43

## 2025-08-01 ASSESSMENT — LIFESTYLE VARIABLES
BLOOD PRESSURE: 130/80
TREMOR: NOT VISIBLE, BUT CAN BE FELT FINGERTIP TO FINGERTIP
HEADACHE, FULLNESS IN HEAD: NOT PRESENT
AUDITORY DISTURBANCES: NOT PRESENT
ANXIETY: MILDLY ANXIOUS
PAROXYSMAL SWEATS: 3
ORIENTATION AND CLOUDING OF SENSORIUM: ORIENTED AND CAN DO SERIAL ADDITIONS
HAVE PEOPLE ANNOYED YOU BY CRITICIZING YOUR DRINKING: YES
AGITATION: NORMAL ACTIVITY
HAVE YOU EVER FELT YOU SHOULD CUT DOWN ON YOUR DRINKING: YES
PAROXYSMAL SWEATS: BARELY PERCEPTIBLE SWEATING, PALMS MOIST
NAUSEA AND VOMITING: NO NAUSEA AND NO VOMITING
TOTAL SCORE: 4
ORIENTATION AND CLOUDING OF SENSORIUM: ORIENTED AND CAN DO SERIAL ADDITIONS
NAUSEA AND VOMITING: NO NAUSEA AND NO VOMITING
TOTAL SCORE: 8
AUDITORY DISTURBANCES: NOT PRESENT
EVER HAD A DRINK FIRST THING IN THE MORNING TO STEADY YOUR NERVES TO GET RID OF A HANGOVER: YES
AGITATION: SOMEWHAT MORE THAN NORMAL ACTIVITY
EVER FELT BAD OR GUILTY ABOUT YOUR DRINKING: YES
VISUAL DISTURBANCES: NOT PRESENT
TOTAL SCORE: 3
TREMOR: NOT VISIBLE, BUT CAN BE FELT FINGERTIP TO FINGERTIP
HEADACHE, FULLNESS IN HEAD: NOT PRESENT
PULSE: 106
PULSE: 94
ANXIETY: 3
VISUAL DISTURBANCES: NOT PRESENT

## 2025-08-01 ASSESSMENT — PAIN - FUNCTIONAL ASSESSMENT: PAIN_FUNCTIONAL_ASSESSMENT: 0-10

## 2025-08-01 ASSESSMENT — PAIN SCALES - GENERAL
PAINLEVEL_OUTOF10: 0 - NO PAIN

## 2025-08-02 ENCOUNTER — APPOINTMENT (OUTPATIENT)
Dept: RADIOLOGY | Facility: HOSPITAL | Age: 65
End: 2025-08-02
Payer: COMMERCIAL

## 2025-08-02 ENCOUNTER — HOSPITAL ENCOUNTER (EMERGENCY)
Facility: HOSPITAL | Age: 65
Discharge: OTHER INSTITUTION | End: 2025-08-02
Attending: EMERGENCY MEDICINE
Payer: COMMERCIAL

## 2025-08-02 VITALS
HEART RATE: 102 BPM | DIASTOLIC BLOOD PRESSURE: 104 MMHG | OXYGEN SATURATION: 97 % | BODY MASS INDEX: 26.41 KG/M2 | SYSTOLIC BLOOD PRESSURE: 165 MMHG | TEMPERATURE: 99.3 F | RESPIRATION RATE: 16 BRPM | HEIGHT: 72 IN | WEIGHT: 195 LBS

## 2025-08-02 VITALS
BODY MASS INDEX: 26.41 KG/M2 | OXYGEN SATURATION: 97 % | HEART RATE: 107 BPM | TEMPERATURE: 97.9 F | WEIGHT: 195 LBS | SYSTOLIC BLOOD PRESSURE: 170 MMHG | RESPIRATION RATE: 17 BRPM | DIASTOLIC BLOOD PRESSURE: 94 MMHG | HEIGHT: 72 IN

## 2025-08-02 DIAGNOSIS — F10.10 ALCOHOL ABUSE: Primary | ICD-10-CM

## 2025-08-02 LAB
APAP SERPL-MCNC: <10 UG/ML (ref ?–30)
ETHANOL SERPL-MCNC: 136 MG/DL
GLUCOSE BLD MANUAL STRIP-MCNC: 127 MG/DL (ref 74–99)
SALICYLATES SERPL-MCNC: <3 MG/DL (ref ?–20)

## 2025-08-02 PROCEDURE — 82947 ASSAY GLUCOSE BLOOD QUANT: CPT

## 2025-08-02 PROCEDURE — 93971 EXTREMITY STUDY: CPT | Performed by: RADIOLOGY

## 2025-08-02 PROCEDURE — 96374 THER/PROPH/DIAG INJ IV PUSH: CPT

## 2025-08-02 PROCEDURE — 99285 EMERGENCY DEPT VISIT HI MDM: CPT | Mod: 25 | Performed by: EMERGENCY MEDICINE

## 2025-08-02 PROCEDURE — 2500000001 HC RX 250 WO HCPCS SELF ADMINISTERED DRUGS (ALT 637 FOR MEDICARE OP): Performed by: EMERGENCY MEDICINE

## 2025-08-02 PROCEDURE — 80143 DRUG ASSAY ACETAMINOPHEN: CPT

## 2025-08-02 PROCEDURE — 2500000004 HC RX 250 GENERAL PHARMACY W/ HCPCS (ALT 636 FOR OP/ED)

## 2025-08-02 PROCEDURE — 2500000002 HC RX 250 W HCPCS SELF ADMINISTERED DRUGS (ALT 637 FOR MEDICARE OP, ALT 636 FOR OP/ED)

## 2025-08-02 PROCEDURE — 36415 COLL VENOUS BLD VENIPUNCTURE: CPT

## 2025-08-02 PROCEDURE — 2500000001 HC RX 250 WO HCPCS SELF ADMINISTERED DRUGS (ALT 637 FOR MEDICARE OP)

## 2025-08-02 PROCEDURE — 93971 EXTREMITY STUDY: CPT

## 2025-08-02 RX ORDER — LANOLIN ALCOHOL/MO/W.PET/CERES
100 CREAM (GRAM) TOPICAL DAILY
Status: DISCONTINUED | OUTPATIENT
Start: 2025-08-05 | End: 2025-08-02 | Stop reason: HOSPADM

## 2025-08-02 RX ORDER — DIAZEPAM 5 MG/1
10 TABLET ORAL EVERY 2 HOUR PRN
Status: DISCONTINUED | OUTPATIENT
Start: 2025-08-02 | End: 2025-08-02 | Stop reason: HOSPADM

## 2025-08-02 RX ORDER — FOLIC ACID 1 MG/1
1 TABLET ORAL DAILY
Status: DISCONTINUED | OUTPATIENT
Start: 2025-08-02 | End: 2025-08-02 | Stop reason: HOSPADM

## 2025-08-02 RX ORDER — MULTIVIT-MIN/IRON FUM/FOLIC AC 7.5 MG-4
1 TABLET ORAL DAILY
Status: DISCONTINUED | OUTPATIENT
Start: 2025-08-02 | End: 2025-08-02 | Stop reason: HOSPADM

## 2025-08-02 RX ORDER — THIAMINE HYDROCHLORIDE 100 MG/ML
100 INJECTION, SOLUTION INTRAMUSCULAR; INTRAVENOUS DAILY
Status: DISCONTINUED | OUTPATIENT
Start: 2025-08-02 | End: 2025-08-02 | Stop reason: HOSPADM

## 2025-08-02 RX ADMIN — FOLIC ACID 1 MG: 1 TABLET ORAL at 16:44

## 2025-08-02 RX ADMIN — DIAZEPAM 10 MG: 5 TABLET ORAL at 18:42

## 2025-08-02 RX ADMIN — THIAMINE HYDROCHLORIDE 100 MG: 100 INJECTION, SOLUTION INTRAMUSCULAR; INTRAVENOUS at 16:44

## 2025-08-02 RX ADMIN — Medication 1 TABLET: at 16:44

## 2025-08-02 RX ADMIN — LORAZEPAM 1 MG: 1 TABLET ORAL at 02:44

## 2025-08-02 RX ADMIN — DIAZEPAM 10 MG: 5 TABLET ORAL at 16:44

## 2025-08-02 ASSESSMENT — LIFESTYLE VARIABLES
ANXIETY: MILDLY ANXIOUS
PAROXYSMAL SWEATS: BARELY PERCEPTIBLE SWEATING, PALMS MOIST
ANXIETY: NO ANXIETY, AT EASE
TOTAL SCORE: 3
NAUSEA AND VOMITING: 3
VISUAL DISTURBANCES: NOT PRESENT
AUDITORY DISTURBANCES: NOT PRESENT
TACTILE DISTURBANCES: VERY MILD ITCHING, PINS AND NEEDLES, BURNING OR NUMBNESS
HEADACHE, FULLNESS IN HEAD: NOT PRESENT
HEADACHE, FULLNESS IN HEAD: NOT PRESENT
PAROXYSMAL SWEATS: BARELY PERCEPTIBLE SWEATING, PALMS MOIST
PAROXYSMAL SWEATS: 2
ANXIETY: MILDLY ANXIOUS
AGITATION: NORMAL ACTIVITY
TREMOR: NOT VISIBLE, BUT CAN BE FELT FINGERTIP TO FINGERTIP
PAROXYSMAL SWEATS: BARELY PERCEPTIBLE SWEATING, PALMS MOIST
ORIENTATION AND CLOUDING OF SENSORIUM: ORIENTED AND CAN DO SERIAL ADDITIONS
PULSE: 92
ORIENTATION AND CLOUDING OF SENSORIUM: ORIENTED AND CAN DO SERIAL ADDITIONS
HEADACHE, FULLNESS IN HEAD: NOT PRESENT
TREMOR: NOT VISIBLE, BUT CAN BE FELT FINGERTIP TO FINGERTIP
ANXIETY: NO ANXIETY, AT EASE
TOTAL SCORE: 3
HEADACHE, FULLNESS IN HEAD: NOT PRESENT
TACTILE DISTURBANCES: MILD ITCHING, PINS AND NEEDLES, BURNING OR NUMBNESS
VISUAL DISTURBANCES: NOT PRESENT
ORIENTATION AND CLOUDING OF SENSORIUM: ORIENTED AND CAN DO SERIAL ADDITIONS
AGITATION: NORMAL ACTIVITY
TOTAL SCORE: 8
BLOOD PRESSURE: 165/104
NAUSEA AND VOMITING: NO NAUSEA AND NO VOMITING
TREMOR: NOT VISIBLE, BUT CAN BE FELT FINGERTIP TO FINGERTIP
VISUAL DISTURBANCES: NOT PRESENT
VISUAL DISTURBANCES: NOT PRESENT
ORIENTATION AND CLOUDING OF SENSORIUM: ORIENTED AND CAN DO SERIAL ADDITIONS
AUDITORY DISTURBANCES: NOT PRESENT
BLOOD PRESSURE: 122/84
ANXIETY: MODERATELY ANXIOUS, OR GUARDED, SO ANXIETY IS INFERRED
AUDITORY DISTURBANCES: NOT PRESENT
TREMOR: MODERATE, WITH PATIENT'S ARMS EXTENDED
TREMOR: NO TREMOR
HEADACHE, FULLNESS IN HEAD: NOT PRESENT
NAUSEA AND VOMITING: NO NAUSEA AND NO VOMITING
TOTAL SCORE: 0
AGITATION: SOMEWHAT MORE THAN NORMAL ACTIVITY
AGITATION: SOMEWHAT MORE THAN NORMAL ACTIVITY
PULSE: 102
AUDITORY DISTURBANCES: NOT PRESENT
NAUSEA AND VOMITING: NO NAUSEA AND NO VOMITING
TOTAL SCORE: 10
BLOOD PRESSURE: 140/70
AGITATION: NORMAL ACTIVITY
NAUSEA AND VOMITING: MILD NAUSEA WITH NO VOMITING
VISUAL DISTURBANCES: NOT PRESENT
HEADACHE, FULLNESS IN HEAD: NOT PRESENT
AUDITORY DISTURBANCES: NOT PRESENT
PAROXYSMAL SWEATS: NO SWEAT VISIBLE
ORIENTATION AND CLOUDING OF SENSORIUM: ORIENTED AND CAN DO SERIAL ADDITIONS
TOTAL SCORE: 12
ORIENTATION AND CLOUDING OF SENSORIUM: ORIENTED AND CAN DO SERIAL ADDITIONS
NAUSEA AND VOMITING: NO NAUSEA AND NO VOMITING
AGITATION: NORMAL ACTIVITY
ANXIETY: MODERATELY ANXIOUS, OR GUARDED, SO ANXIETY IS INFERRED
VISUAL DISTURBANCES: NOT PRESENT
PULSE: 100
PAROXYSMAL SWEATS: BARELY PERCEPTIBLE SWEATING, PALMS MOIST
TREMOR: MODERATE, WITH PATIENT'S ARMS EXTENDED
PULSE: 96
AUDITORY DISTURBANCES: NOT PRESENT

## 2025-08-02 ASSESSMENT — PAIN SCALES - GENERAL
PAINLEVEL_OUTOF10: 0 - NO PAIN

## 2025-08-02 NOTE — PROGRESS NOTES
08/02/25 1748   Discharge Planning   Who is requesting discharge planning? Provider   Home or Post Acute Services Community services;Other (Comment)  (Per provider pt returned to the ED after being accepted to  for rehab bed yesterday-but declined. Provider requested referral be sent back to .As of 5pm case is under review.  will call the ed provider directly if they can admit him tonight.)   Expected Discharge Disposition Othe  (Referral under review at [facility] who will call ed provider directly at [phone] if they can accept tonight. Pt will require direct accompanyment/ambulance transport. He cannot self report per lea at .)   Does the patient need discharge transport arranged? Yes     CM faxed medical chart to  at [phone]. Chart confirmed received and is under review.     Dispo: Pending new acceptance to [facility]. Pt had a bed offered yesterday but he ultimately turned it down and discharged.  will call the ED provider directly if they can accept the patient tonight. CM for the ED added to chat and will follow up tomorrow.   YEIMI: tomorrow

## 2025-08-02 NOTE — DISCHARGE INSTRUCTIONS
You need to slowly decrease the amount that you are drinking, quitting cold turkey can cause fatal withdrawal symptoms    Please follow-up with outpatient alcohol rehab

## 2025-08-02 NOTE — ED TRIAGE NOTES
"Pt left this AM from the ER. Pt came for detox. Pt has now returned for detox, \"I made a mistake, I wanna go to detox\".  "

## 2025-08-02 NOTE — ED PROVIDER NOTES
HPI   Chief Complaint   Patient presents with    Alcohol Problem       Patient is a 65-year-old male who presents emergency department for alcohol detox.  Patient was just here last night had medical clearance and was accepted at outpatient detox facility at 10:30am. He ultimately left this morning and backed out of detox, but is back now stating he wants and needs to go to detox. He reports his last drink was 2-3 hours ago. He states he feels somewhat shakey. He also complains of some swelling to the right lower extremity that started after he left. No injury or difficulty ambulating and he notes it is mildly sore. He has no other complaints at this time. He is not acutely suicidal or homicidal.       History provided by:  Patient   used: No            Patient History   Medical History[1]  Surgical History[2]  Family History[3]  Social History[4]    Physical Exam   ED Triage Vitals [08/02/25 1550]   Temperature Heart Rate Respirations BP   37.4 °C (99.3 °F) (!) 102 16 (!) 165/104      Pulse Ox Temp src Heart Rate Source Patient Position   97 % -- -- --      BP Location FiO2 (%)     -- --       Physical Exam  Constitutional:       Comments: Smells of alcohol     Cardiovascular:      Rate and Rhythm: Regular rhythm. Tachycardia present.   Pulmonary:      Effort: Pulmonary effort is normal.      Breath sounds: Normal breath sounds.   Abdominal:      General: Abdomen is flat.      Palpations: Abdomen is soft.      Tenderness: There is no abdominal tenderness.     Musculoskeletal:         General: Normal range of motion.      Comments: Minimal swelling to bilateral lower ankles slightly increased on the right when compared to the left. No overlying erythema or warmth.     Skin:     General: Skin is warm and dry.     Neurological:      General: No focal deficit present.      Mental Status: He is alert and oriented to person, place, and time.           ED Course & MDM   ED Course as of 08/02/25 0336    Sat Aug 02, 2025   1700 Spoke with care coordinator Karla Tavares about patient. She has resent referral to [facility] where patient was accepted previously this morning. [AF]   1923 Spoke with Kate at [facility] who states patient is accepted at this time for detox. Accepting doc is Dr. Ozuna. Patient is good to go over now. [AF]      ED Course User Index  [AF] Sallie Milian PA-C         Diagnoses as of 08/02/25 2112   Alcohol abuse                 No data recorded     [place] Coma Scale Score: 15 (08/02/25 1550 : Dina Chong, RN)                           Medical Decision Making  Patient is a 65-year-old male who presents to the emergency department for evaluation for placement for detox.    Lab work done today included alcohol level.    Scans done today were interpreted/confirmed by radiologist and also interpreted by me which included right lower extremity. Ultrasound shows no evidence of DVT.     Medications given at today's visit include p.o. thiamine, multivitamin, Valium    I saw this patient in conjunction with Dr. Moncada.  Patient myles stable in the emergency department.  Alcohol awake collected today and 135 showing still acute alcohol intoxication as patient does admit to drinking.  He had labs drawn last night and urinalysis which were all unremarkable.  Given his concern for right lower extremity swelling ultrasound was done which shows no evidence of DVT.  Patient otherwise medically clear for placement into detox.  I spoke with care coordinator who referred patient to [facility].  Ultimately patient was accepted at [facility] for alcohol detox moving forward by Dr. Ozuna.  Patient was transferred in stable condition for further detox care from alcohol.    ** Disclaimer:  Parts of this document were written utilizing a voice to text dictation software.  Note may contain minor transcription or typographical errors that were inadvertently transcribed by the computer  software.        Procedure  Procedures       [1]   Past Medical History:  Diagnosis Date    Alcohol use     Depression     Hypertension    [2] No past surgical history on file.  [3]   Family History  Problem Relation Name Age of Onset    Hypertension Other      Heart disease Other     [4]   Social History  Tobacco Use    Smoking status: Every Day     Types: Cigarettes    Smokeless tobacco: Not on file   Substance Use Topics    Alcohol use: Yes     Comment: 3 pints of vodka a day    Drug use: Yes     Types: Marijuana        Sallie Milian PA-C  08/02/25 8013

## 2025-08-02 NOTE — ED NOTES
Accepted [facility] Dr. Neal [phone] can go after 1030AM     Peter Lerma, RN  08/02/25 8445

## 2025-08-02 NOTE — ED NOTES
Referral sent to [facility] at this time for detox.     Peter Lerma RN  08/02/25 0247

## 2025-08-02 NOTE — ED NOTES
Call placed to [facility] intake at this time. RN to fax patients chart over for referral for alcohol detox.      Peter Lerma RN  08/01/25 2200       Peter Lerma RN  08/01/25 8026

## 2025-08-02 NOTE — ED NOTES
Called [facility] at this time for a update on referral we sent over. [facility] has not had chance to review and will call once reviewed.      Peter Lerma RN  08/02/25 9854

## 2025-08-02 NOTE — ED PROVIDER NOTES
HPI   Chief Complaint   Patient presents with    Alcohol Problem     Patient coming in for help with his alcohol withdrawal symptoms- last drink 2 hours ago but patient states already getting tremors.  Patient reports that he was here a couple weeks ago but backed out of getting treatment but today he wants to try and follow through.       HPI  65-year-old male history of alcohol dependence presents requesting detox.  Patient's last drink was couple hours ago.  States he gets some tremors.  Was here couple weeks ago to go to detox but did not pursue it but today he would like to.  No chest pain or shortness of breath.  No suicidal homicidal ideation.  No other complaints.      Patient History   Medical History[1]  Surgical History[2]  Family History[3]  Social History[4]    Physical Exam   ED Triage Vitals [08/01/25 1956]   Temperature Heart Rate Respirations BP   36.6 °C (97.9 °F) (!) 120 18 (!) 168/110      Pulse Ox Temp Source Heart Rate Source Patient Position   95 % Temporal Monitor Sitting      BP Location FiO2 (%)     Left arm --       Physical Exam  General:  Awake, alert, no acute distress.  Head: Normocephalic, Atraumatic  Neck: Supple, trachea midline, no stridor  Skin: Warm and dry, no rashes   Lungs: Clear to auscultation bilaterally no acute respiratory distress, speaking in full sentences without difficulty  CV: Regular Rate Rhythm with no obvious murmurs gallops rubs noted, no jugular venous distention, no pedal edema   Abdomen: Soft, nontender, nondistended, positive bowel sounds, no peritoneal signs  Neuro:  No gross focal neurologic deficits, NIH is 0  Musculoskeletal:  Full range of motion in all 4 extremities  Psychiatric:  Alert oriented x 3, Good insight into condition.    ED Course & MDM   Diagnoses as of 08/03/25 0213   Alcohol abuse                 No data recorded     [place] Coma Scale Score: 15 (08/01/25 2012 : Peter Lerma, FRANCIS)                           Medical Decision Making  My EKG  interpretation at 2023:  Sinus tachycardia 107 bpm normal axis  QTc 440 no ectopy or acute ischemic changes noted    Patient is medically cleared to go to detox    Patient accepted to [facility] for detox.    Procedure  Procedures         Barrett KIRSTIN Marybeth,   08/02/25 0322       [1]   Past Medical History:  Diagnosis Date    Alcohol use     Depression     Hypertension    [2] No past surgical history on file.  [3]   Family History  Problem Relation Name Age of Onset    Hypertension Other      Heart disease Other     [4]   Social History  Tobacco Use    Smoking status: Every Day     Types: Cigarettes    Smokeless tobacco: Not on file   Substance Use Topics    Alcohol use: Yes     Comment: 3 pints of vodka a day    Drug use: Yes     Types: Marijuana        Barrett Rueda,   08/03/25 0213

## 2025-08-02 NOTE — ED PROVIDER NOTES
Signed out to me as admitted for outpatient rehab placement for alcohol intoxication.  He informed nursing staff that he no longer wants to go.  I did speak to him, he states that he has changed his mind.  Denies suicidal homicidal ideation.  He does appear clinically sober.  He is requesting a Uber ride back home which we have provided for him.  He is otherwise decisional, no profound withdrawals..    Will be discharged per his wishes     Bk Young MD  08/02/25 0609

## 2025-08-02 NOTE — ED NOTES
RN in to do patient CIWA patient states he no longer wants to go to detox.MD made aware and MD in to see patient. Uber set up for patient at this time.      Peter Lerma RN  08/02/25 0654

## 2025-08-09 ENCOUNTER — HOSPITAL ENCOUNTER (EMERGENCY)
Facility: HOSPITAL | Age: 65
Discharge: HOME | End: 2025-08-09
Attending: EMERGENCY MEDICINE
Payer: COMMERCIAL

## 2025-08-09 ENCOUNTER — APPOINTMENT (OUTPATIENT)
Dept: RADIOLOGY | Facility: HOSPITAL | Age: 65
End: 2025-08-09
Payer: COMMERCIAL

## 2025-08-09 VITALS
WEIGHT: 195 LBS | TEMPERATURE: 97.7 F | RESPIRATION RATE: 18 BRPM | HEIGHT: 72 IN | OXYGEN SATURATION: 95 % | HEART RATE: 81 BPM | DIASTOLIC BLOOD PRESSURE: 86 MMHG | BODY MASS INDEX: 26.41 KG/M2 | SYSTOLIC BLOOD PRESSURE: 132 MMHG

## 2025-08-09 DIAGNOSIS — M79.661 PAIN AND SWELLING OF LOWER LEG, RIGHT: Primary | ICD-10-CM

## 2025-08-09 DIAGNOSIS — I10 HYPERTENSION, UNSPECIFIED TYPE: ICD-10-CM

## 2025-08-09 DIAGNOSIS — M79.89 PAIN AND SWELLING OF LOWER LEG, RIGHT: Primary | ICD-10-CM

## 2025-08-09 LAB
ATRIAL RATE: 107 BPM
P AXIS: 55 DEGREES
P OFFSET: 180 MS
P ONSET: 128 MS
PR INTERVAL: 174 MS
Q ONSET: 215 MS
QRS COUNT: 18 BEATS
QRS DURATION: 90 MS
QT INTERVAL: 330 MS
QTC CALCULATION(BAZETT): 440 MS
QTC FREDERICIA: 400 MS
R AXIS: 53 DEGREES
T AXIS: 51 DEGREES
T OFFSET: 380 MS
VENTRICULAR RATE: 107 BPM

## 2025-08-09 PROCEDURE — 73590 X-RAY EXAM OF LOWER LEG: CPT | Mod: RIGHT SIDE | Performed by: RADIOLOGY

## 2025-08-09 PROCEDURE — 99284 EMERGENCY DEPT VISIT MOD MDM: CPT | Mod: 25 | Performed by: EMERGENCY MEDICINE

## 2025-08-09 PROCEDURE — 93971 EXTREMITY STUDY: CPT

## 2025-08-09 PROCEDURE — 2500000001 HC RX 250 WO HCPCS SELF ADMINISTERED DRUGS (ALT 637 FOR MEDICARE OP): Performed by: EMERGENCY MEDICINE

## 2025-08-09 PROCEDURE — 93971 EXTREMITY STUDY: CPT | Performed by: RADIOLOGY

## 2025-08-09 PROCEDURE — 73590 X-RAY EXAM OF LOWER LEG: CPT | Mod: RT

## 2025-08-09 RX ORDER — NAPROXEN 500 MG/1
500 TABLET ORAL
Qty: 20 TABLET | Refills: 0 | Status: SHIPPED | OUTPATIENT
Start: 2025-08-09 | End: 2025-08-19

## 2025-08-09 RX ORDER — IBUPROFEN 400 MG/1
400 TABLET, FILM COATED ORAL ONCE
Status: COMPLETED | OUTPATIENT
Start: 2025-08-09 | End: 2025-08-09

## 2025-08-09 RX ORDER — ACETAMINOPHEN 325 MG/1
650 TABLET ORAL ONCE
Status: COMPLETED | OUTPATIENT
Start: 2025-08-09 | End: 2025-08-09

## 2025-08-09 RX ADMIN — ACETAMINOPHEN 650 MG: 325 TABLET ORAL at 20:54

## 2025-08-09 RX ADMIN — IBUPROFEN 400 MG: 400 TABLET ORAL at 20:54

## 2025-08-09 ASSESSMENT — PAIN SCALES - GENERAL: PAINLEVEL_OUTOF10: 9

## 2025-08-09 ASSESSMENT — PAIN DESCRIPTION - ORIENTATION: ORIENTATION: RIGHT

## 2025-08-09 ASSESSMENT — PAIN - FUNCTIONAL ASSESSMENT: PAIN_FUNCTIONAL_ASSESSMENT: 0-10

## 2025-08-09 ASSESSMENT — PAIN DESCRIPTION - LOCATION: LOCATION: LEG

## 2025-08-09 NOTE — ED TRIAGE NOTES
Triage note:   Date of encounter: 8/9/2025  MRN: [id number]    I saw the patient as a clinician in triage and performed a brief history and physical exam established acuity and ordered appropriate tests developed basic plan of care.  Patient will be seen by JACK and/or the physician who will independently evaluate the patient.  Please see subsequent provider notes for further details and disposition.     Brief HPI:   In brief, 65-year-old male presenting to the emergency department evaluation of right calf pain and swelling.  Symptoms started a week ago.  No injury or trauma.  He denies any fevers or chills.  No abrasions to his right leg.  Does not take any blood thinners.  No previous history of DVT or PE.  Patient does drink alcohol daily.    Focused physical exam:   General: Alert and oriented.  No acute distress  Vitals: See nursing note for vitals  HEENT: Normocephalic, atraumatic.    Neck: Soft and supple  Cardiovascular: Clear S1 and S2.  Regular rate and rhythm.   Pulmonary: Lungs are clear to auscultation bilaterally.  Symmetric rise and fall of chest wall.  MSK: Swelling and redness and pain over palpation of the right calf.  2+ pedal pulses bilaterally.  Full range of motion of all joints of right lower extremity.  No abrasions of the skin on right leg  Neuro: Answering questions appropriately.  Psych: Cooperative with appropriate mood and affect.    Plan/MDM:     Ultrasound ordered    Please see subsequent provider note for details and disposition

## 2025-08-10 NOTE — DISCHARGE INSTRUCTIONS
Follow-up with your primary care physician within 1 to 2 days for further management of your current symptoms and repeat check of your blood pressure.      Follow-up with orthopedics within 2 to 3 days for further management of your leg pain      Return to the emergency department sooner with worsening of symptoms or onset of new symptoms

## 2025-08-10 NOTE — PROGRESS NOTES
Attestation/Supervisory note for RIGO Espitia      The patient is a 65-year-old male presenting to the emergency department for evaluation of right lower leg pain and swelling.  He states he has had symptoms for the past 7 to 10 days.  He states he does have a history of substance abuse and alcohol abuse but states that he has not had anything to drink today and has not used any illicit substances today.  He states that he came to the emergency room because he has had persistent symptoms.  He denies any recent injury or trauma.  He denies any headache or visual changes.  No chest pain or shortness of breath.  No abdominal pain.  No nausea, vomiting or diarrhea.  No urinary complaints.  No fever or chills.  No cough or congestion.  He denies any history of DVT or PE.  All pertinent positives and negatives are recorded above.  All other systems reviewed and otherwise negative.  Vital signs with hypertension and mild tachycardia but otherwise within normal limits.  Physical exam with a well-nourished well-developed male with disheveled appearance and poor hygiene but no evidence of acute distress.  HEENT exam with dry mucous membranes but otherwise unremarkable.  He has no evidence of airway compromise or respiratory distress.  Abdominal exam is benign.  He does not have any gross motor, neurologic or vascular deficits on exam.  He does have some pain with palpation of the right calf.  He does have some edema of the right lower leg compared to the left lower leg but there is no visible or palpable bony deformity on exam.  No warmth or erythema.  He does have equal pulses bilaterally.  He does not have any gross motor, neurologic or vascular deficits on exam.  He is able to walk and stand without assistance.      Oral ibuprofen and oral acetaminophen ordered    XR tibia fibula right 2 views   Final Result   No acute fracture. Diffuse soft tissue swelling.        Mild right knee osteoarthrosis.        MACRO:   None         Signed by: Alessandro Whitfield 8/9/2025 9:20 PM   Dictation workstation:   [id number]      Lower extremity venous duplex right   Final Result   No sonographic evidence of acute DVT in the visualized vessels of the   right lower extremity.        MACRO:   None        Signed by: Ctdanii Whitfield 8/9/2025 9:18 PM   Dictation workstation:   [id number]           The patient does not have any evidence of airway compromise or respiratory distress on exam.  He is well-perfused on exam.  He does have some pain with palpation of the right calf and he does have some edema of the right lower extremity compared to the left lower extremity.  He denies any recent injury or trauma.  He does not have any visible or palpable bony deformity on exam.  X-ray shows no evidence of fracture or dislocation.  He does have some diffuse soft tissue swelling.  Duplex of the right lower extremity shows no evidence of DVT or abscess.  No evidence of compartment syndrome on exam at this time.      The patient was released in good condition.  He was instructed follow-up with his primary care physician within 1 to 2 days for further management of his current symptoms and repeat check of his blood pressure.  He was also given a referral to orthopedics.  He will return to the emergency department sooner with worsening of symptoms or onset of new symptoms.      Impression/diagnosis:  1.  Right lower leg pain and swelling  2.  Hypertension, unspecified      I personally saw the patient and made/approve the management plan and take responsibility for the patient management.      I personally discussed the patient's management with the patient      I reviewed the results of the diagnostic imaging.  Formal radiology read was completed by the radiologist.      Itzel Diaz MD

## 2025-08-10 NOTE — ED PROVIDER NOTES
HPI   Chief Complaint   Patient presents with    Leg Swelling     R leg swelling and pain x 1 week       HPI        Patient History   Medical History[1]  Surgical History[2]  Family History[3]  Social History[4]    Physical Exam   ED Triage Vitals   Temperature Heart Rate Respirations BP   08/09/25 1954 08/09/25 1954 08/09/25 1954 08/09/25 1956   36.5 °C (97.7 °F) (!) 103 18 (!) 169/110      Pulse Ox Temp src Heart Rate Source Patient Position   08/09/25 1954 -- -- --   97 %         BP Location FiO2 (%)     -- --             Physical Exam      ED Course & MDM                  No data recorded                                 Medical Decision Making      Procedure  Procedures       [1]   Past Medical History:  Diagnosis Date    Alcohol use     Depression     Hypertension    [2] No past surgical history on file.  [3]   Family History  Problem Relation Name Age of Onset    Hypertension Other      Heart disease Other     [4]   Social History  Tobacco Use    Smoking status: Every Day     Types: Cigarettes    Smokeless tobacco: Not on file   Substance Use Topics    Alcohol use: Yes     Comment: 3 pints of vodka a day    Drug use: Yes     Types: Marijuana      not specific to one specific moment during their visit and furthermore my MDM encompasses this entire chart and not only this text box.    HPI:   A medically appropriate HPI was obtained, outlined above.    Malik Evans is a  65 y.o. male    Chief Complaint   Patient presents with    Leg Swelling     R leg swelling and pain x 1 week       Medical History[5]    Surgical History[6]    Social History[7]    Family History[8]    Allergies[9]    Current Outpatient Medications   Medication Instructions    ARIPiprazole (ABILIFY) 5 mg, oral, Daily    cloNIDine (CATAPRES) 0.1 mg, oral, 2 times daily    desvenlafaxine (PRISTIQ) 100 mg, oral, Daily, Do not crush, chew, or split.    famotidine (PEPCID) 20 mg, oral, 2 times daily    hydrOXYzine HCL (ATARAX) 25 mg, Every 4 hours PRN    lamoTRIgine (LAMICTAL) 100 mg, oral, 2 times daily    loratadine (CLARITIN) 10 mg, oral, Daily    mirtazapine (REMERON) 45 mg, Nightly    naproxen (NAPROSYN) 500 mg, oral, 2 times daily (morning and late afternoon)   for details    Exam:   No data found.    A medically appropriate exam performed, outlined above, given the known history and presentation.    EKG/Cardiac monitor:   If EKG was done and, it was interpreted by attending physician, see their note for ED course for more detail.    Medications given during visit:  Medications   acetaminophen (Tylenol) tablet 650 mg (650 mg oral Given 8/9/25 2054)   ibuprofen tablet 400 mg (400 mg oral Given 8/9/25 2054)        Diagnostic/tests:  Labs Reviewed - No data to display     XR tibia fibula right 2 views   Final Result   No acute fracture. Diffuse soft tissue swelling.        Mild right knee osteoarthrosis.        MACRO:   None        Signed by: Alessandro Whitfield 8/9/2025 9:20 PM   Dictation workstation:   [id number]      Lower extremity venous duplex right   Final Result   No sonographic evidence of acute DVT in the visualized vessels of the   right lower extremity.        MACRO:   None         Signed by: Alessandro Whitfield 8/9/2025 9:18 PM   Dictation workstation:   [id number]             [facility] Summary:  No evidence of DVT or compartment syndrome or acute fracture.  Patient stable for discharge at this time.  Return precautions were discussed aftercare instructions were provided.    We have discussed the diagnosis and risks, and we agree with discharging home to follow-up with appropriate physician as directed. We also discussed returning to the Emergency Department immediately if new or worsening symptoms occur. We have discussed the symptoms which are most concerning that necessitate immediate return. Pt symptoms have been well controlled here and the patient is safe for discharge with appropriate outpatient follow up. The patient has verbalized understanding to return to ER without delay for new or worsening pains or for any other symptoms or concerns. I utilized the discharge clinical management tool provided [employer] to help estimate risk of negative outcome for this patient.        Disposition:  ED Prescriptions       Medication Sig Dispense Start Date End Date Auth. Provider    naproxen (Naprosyn) 500 mg tablet Take 1 tablet (500 mg) by mouth 2 times daily (morning and late afternoon) for 10 days. 20 tablet 8/9/2025 8/19/2025 Itzel Diaz MD            All of the patient's questions were answered to the best of my ability. Patient states understanding that they have been screened for an emergency today and we have not found any etiology of symptoms that requires emergent treatment or admission to the hospital at this point. They understand that they may have not had definitive care today and require follow-up for treatment of their condition. They also state understanding that they may have an emergent condition that may potentially have not of detected at this visit and they must return to the emergency department if they develop any worsening of symptoms or new complaints.        Procedure  Procedures       [1]   Past Medical History:  Diagnosis Date    Alcohol use     Depression     Hypertension    [2] No past surgical history on file.  [3]   Family History  Problem Relation Name Age of Onset    Hypertension Other      Heart disease Other     [4]   Social History  Tobacco Use    Smoking status: Every Day     Types: Cigarettes    Smokeless tobacco: Not on file   Substance Use Topics    Alcohol use: Yes     Comment: 3 pints of vodka a day    Drug use: Yes     Types: Marijuana   [5]   Past Medical History:  Diagnosis Date    Alcohol use     Depression     Hypertension    [6] No past surgical history on file.  [7]   Social History  Tobacco Use    Smoking status: Every Day     Types: Cigarettes   Substance Use Topics    Alcohol use: Yes     Comment: 3 pints of vodka a day    Drug use: Yes     Types: Marijuana   [8]   Family History  Problem Relation Name Age of Onset    Hypertension Other      Heart disease Other     [9] No Known Allergies       Maurice Espitia PA-C  08/12/25 4695

## 2025-08-11 ENCOUNTER — PATIENT OUTREACH (OUTPATIENT)
Dept: CARE COORDINATION | Facility: CLINIC | Age: 65
End: 2025-08-11
Payer: COMMERCIAL

## 2025-08-15 ENCOUNTER — PATIENT OUTREACH (OUTPATIENT)
Dept: CARE COORDINATION | Facility: CLINIC | Age: 65
End: 2025-08-15
Payer: COMMERCIAL

## 2025-08-31 ENCOUNTER — HOSPITAL ENCOUNTER (EMERGENCY)
Facility: HOSPITAL | Age: 65
Discharge: HOME | End: 2025-08-31
Payer: COMMERCIAL

## 2025-08-31 VITALS
TEMPERATURE: 98.1 F | RESPIRATION RATE: 20 BRPM | OXYGEN SATURATION: 94 % | HEART RATE: 103 BPM | SYSTOLIC BLOOD PRESSURE: 150 MMHG | WEIGHT: 195 LBS | DIASTOLIC BLOOD PRESSURE: 104 MMHG | BODY MASS INDEX: 26.41 KG/M2 | HEIGHT: 72 IN

## 2025-08-31 DIAGNOSIS — R21 RASH: Primary | ICD-10-CM

## 2025-08-31 DIAGNOSIS — M79.89 RIGHT LEG SWELLING: ICD-10-CM

## 2025-08-31 LAB
ANION GAP SERPL CALCULATED.3IONS-SCNC: 13 MMOL/L (ref 10–20)
BASOPHILS # BLD AUTO: 0.06 X10*3/UL (ref 0–0.1)
BASOPHILS NFR BLD AUTO: 0.7 %
BUN SERPL-MCNC: 11 MG/DL (ref 6–23)
CALCIUM SERPL-MCNC: 8.9 MG/DL (ref 8.6–10.3)
CHLORIDE SERPL-SCNC: 102 MMOL/L (ref 98–107)
CO2 SERPL-SCNC: 26 MMOL/L (ref 21–32)
CREAT SERPL-MCNC: 0.81 MG/DL (ref 0.5–1.3)
CRP SERPL-MCNC: 0.25 MG/DL
EGFRCR SERPLBLD CKD-EPI 2021: >90 ML/MIN/1.73M*2
EOSINOPHIL # BLD AUTO: 0.24 X10*3/UL (ref 0–0.7)
EOSINOPHIL NFR BLD AUTO: 2.8 %
ERYTHROCYTE [DISTWIDTH] IN BLOOD BY AUTOMATED COUNT: 11.9 % (ref 11.5–14.5)
ERYTHROCYTE [SEDIMENTATION RATE] IN BLOOD BY WESTERGREN METHOD: 9 MM/H (ref 0–20)
GLUCOSE SERPL-MCNC: 90 MG/DL (ref 74–99)
HCT VFR BLD AUTO: 37.4 % (ref 41–52)
HGB BLD-MCNC: 13.4 G/DL (ref 13.5–17.5)
IMM GRANULOCYTES # BLD AUTO: 0.06 X10*3/UL (ref 0–0.7)
IMM GRANULOCYTES NFR BLD AUTO: 0.7 % (ref 0–0.9)
LYMPHOCYTES # BLD AUTO: 2.18 X10*3/UL (ref 1.2–4.8)
LYMPHOCYTES NFR BLD AUTO: 25.3 %
MCH RBC QN AUTO: 33.9 PG (ref 26–34)
MCHC RBC AUTO-ENTMCNC: 35.8 G/DL (ref 32–36)
MCV RBC AUTO: 95 FL (ref 80–100)
MONOCYTES # BLD AUTO: 0.51 X10*3/UL (ref 0.1–1)
MONOCYTES NFR BLD AUTO: 5.9 %
NEUTROPHILS # BLD AUTO: 5.57 X10*3/UL (ref 1.2–7.7)
NEUTROPHILS NFR BLD AUTO: 64.6 %
NRBC BLD-RTO: 0 /100 WBCS (ref 0–0)
PLATELET # BLD AUTO: 178 X10*3/UL (ref 150–450)
POTASSIUM SERPL-SCNC: 3.3 MMOL/L (ref 3.5–5.3)
RBC # BLD AUTO: 3.95 X10*6/UL (ref 4.5–5.9)
SODIUM SERPL-SCNC: 138 MMOL/L (ref 136–145)
WBC # BLD AUTO: 8.6 X10*3/UL (ref 4.4–11.3)

## 2025-08-31 PROCEDURE — 96374 THER/PROPH/DIAG INJ IV PUSH: CPT

## 2025-08-31 PROCEDURE — 99284 EMERGENCY DEPT VISIT MOD MDM: CPT | Mod: 25

## 2025-08-31 PROCEDURE — 80048 BASIC METABOLIC PNL TOTAL CA: CPT

## 2025-08-31 PROCEDURE — 85652 RBC SED RATE AUTOMATED: CPT

## 2025-08-31 PROCEDURE — 86140 C-REACTIVE PROTEIN: CPT

## 2025-08-31 PROCEDURE — 36415 COLL VENOUS BLD VENIPUNCTURE: CPT

## 2025-08-31 PROCEDURE — 2500000004 HC RX 250 GENERAL PHARMACY W/ HCPCS (ALT 636 FOR OP/ED): Mod: JZ

## 2025-08-31 PROCEDURE — 85025 COMPLETE CBC W/AUTO DIFF WBC: CPT

## 2025-08-31 RX ORDER — METHYLPREDNISOLONE 4 MG/1
TABLET ORAL
Qty: 21 TABLET | Refills: 0 | Status: SHIPPED | OUTPATIENT
Start: 2025-08-31 | End: 2025-09-06

## 2025-08-31 RX ADMIN — METHYLPREDNISOLONE SODIUM SUCCINATE 125 MG: 125 INJECTION, POWDER, FOR SOLUTION INTRAMUSCULAR; INTRAVENOUS at 15:50

## 2025-08-31 ASSESSMENT — PAIN SCALES - GENERAL: PAINLEVEL_OUTOF10: 10 - WORST POSSIBLE PAIN

## 2025-08-31 ASSESSMENT — PAIN - FUNCTIONAL ASSESSMENT: PAIN_FUNCTIONAL_ASSESSMENT: 0-10

## 2025-08-31 ASSESSMENT — PAIN DESCRIPTION - LOCATION: LOCATION: LEG

## 2025-09-02 ENCOUNTER — PATIENT OUTREACH (OUTPATIENT)
Dept: CARE COORDINATION | Facility: CLINIC | Age: 65
End: 2025-09-02
Payer: COMMERCIAL